# Patient Record
Sex: MALE | Race: WHITE | NOT HISPANIC OR LATINO | URBAN - METROPOLITAN AREA
[De-identification: names, ages, dates, MRNs, and addresses within clinical notes are randomized per-mention and may not be internally consistent; named-entity substitution may affect disease eponyms.]

---

## 2024-01-01 ENCOUNTER — OUTPATIENT (OUTPATIENT)
Dept: OUTPATIENT SERVICES | Facility: HOSPITAL | Age: 54
LOS: 1 days | Discharge: HOME | End: 2024-01-01

## 2024-01-01 ENCOUNTER — INPATIENT (INPATIENT)
Facility: HOSPITAL | Age: 54
LOS: 2 days | DRG: 195 | End: 2024-12-02
Attending: FAMILY MEDICINE | Admitting: STUDENT IN AN ORGANIZED HEALTH CARE EDUCATION/TRAINING PROGRAM
Payer: MEDICAID

## 2024-01-01 ENCOUNTER — NON-APPOINTMENT (OUTPATIENT)
Age: 54
End: 2024-01-01

## 2024-01-01 ENCOUNTER — RESULT REVIEW (OUTPATIENT)
Age: 54
End: 2024-01-01

## 2024-01-01 VITALS
TEMPERATURE: 98 F | OXYGEN SATURATION: 98 % | SYSTOLIC BLOOD PRESSURE: 130 MMHG | HEART RATE: 104 BPM | RESPIRATION RATE: 24 BRPM | DIASTOLIC BLOOD PRESSURE: 84 MMHG

## 2024-01-01 VITALS
RESPIRATION RATE: 15 BRPM | DIASTOLIC BLOOD PRESSURE: 59 MMHG | SYSTOLIC BLOOD PRESSURE: 85 MMHG | OXYGEN SATURATION: 97 % | HEART RATE: 127 BPM

## 2024-01-01 DIAGNOSIS — C34.90 MALIGNANT NEOPLASM OF UNSPECIFIED PART OF UNSPECIFIED BRONCHUS OR LUNG: ICD-10-CM

## 2024-01-01 DIAGNOSIS — E86.0 DEHYDRATION: ICD-10-CM
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
## 2024-01-01 DIAGNOSIS — R00.0 TACHYCARDIA, UNSPECIFIED: ICD-10-CM

## 2024-01-01 DIAGNOSIS — J18.9 PNEUMONIA, UNSPECIFIED ORGANISM: ICD-10-CM

## 2024-01-01 DIAGNOSIS — I87.1 COMPRESSION OF VEIN: ICD-10-CM

## 2024-01-01 DIAGNOSIS — Z51.11 ENCOUNTER FOR ANTINEOPLASTIC CHEMOTHERAPY: ICD-10-CM

## 2024-01-01 DIAGNOSIS — I48.0 PAROXYSMAL ATRIAL FIBRILLATION: ICD-10-CM

## 2024-01-01 DIAGNOSIS — R11.2 NAUSEA WITH VOMITING, UNSPECIFIED: ICD-10-CM

## 2024-01-01 DIAGNOSIS — J90 PLEURAL EFFUSION, NOT ELSEWHERE CLASSIFIED: ICD-10-CM

## 2024-01-01 LAB
ALBUMIN FLD-MCNC: 2.4 G/DL — SIGNIFICANT CHANGE UP
ALBUMIN SERPL ELPH-MCNC: 3.7 G/DL — SIGNIFICANT CHANGE UP (ref 3.3–5)
ALBUMIN SERPL ELPH-MCNC: 3.8 G/DL — SIGNIFICANT CHANGE UP (ref 3.3–5)
ALBUMIN SERPL ELPH-MCNC: 3.8 G/DL — SIGNIFICANT CHANGE UP (ref 3.3–5)
ALBUMIN SERPL ELPH-MCNC: 3.8 G/DL — SIGNIFICANT CHANGE UP (ref 3.3–5.2)
ALP SERPL-CCNC: 74 U/L — SIGNIFICANT CHANGE UP (ref 40–120)
ALP SERPL-CCNC: 76 U/L — SIGNIFICANT CHANGE UP (ref 40–120)
ALP SERPL-CCNC: 77 U/L — SIGNIFICANT CHANGE UP (ref 40–120)
ALP SERPL-CCNC: 79 U/L — SIGNIFICANT CHANGE UP (ref 40–120)
ALT FLD-CCNC: 11 U/L — SIGNIFICANT CHANGE UP (ref 10–45)
ALT FLD-CCNC: 205 U/L — HIGH (ref 10–45)
ALT FLD-CCNC: 28 U/L — SIGNIFICANT CHANGE UP
ALT FLD-CCNC: 405 U/L — HIGH (ref 10–45)
ANION GAP SERPL CALC-SCNC: 12 MMOL/L — SIGNIFICANT CHANGE UP (ref 5–17)
ANION GAP SERPL CALC-SCNC: 12 MMOL/L — SIGNIFICANT CHANGE UP (ref 5–17)
ANION GAP SERPL CALC-SCNC: 13 MMOL/L — SIGNIFICANT CHANGE UP (ref 5–17)
ANION GAP SERPL CALC-SCNC: 13 MMOL/L — SIGNIFICANT CHANGE UP (ref 5–17)
ANION GAP SERPL CALC-SCNC: 15 MMOL/L — SIGNIFICANT CHANGE UP (ref 5–17)
ANION GAP SERPL CALC-SCNC: 16 MMOL/L — SIGNIFICANT CHANGE UP (ref 5–17)
ANION GAP SERPL CALC-SCNC: 18 MMOL/L — HIGH (ref 5–17)
ANISOCYTOSIS BLD QL: SLIGHT — SIGNIFICANT CHANGE UP
APTT BLD: 31.1 SEC — SIGNIFICANT CHANGE UP (ref 24.5–35.6)
AST SERPL-CCNC: 186 U/L — HIGH (ref 10–40)
AST SERPL-CCNC: 32 U/L — SIGNIFICANT CHANGE UP
AST SERPL-CCNC: 41 U/L — HIGH (ref 10–40)
AST SERPL-CCNC: 86 U/L — HIGH (ref 10–40)
B PERT IGG+IGM PNL SER: ABNORMAL
BASE EXCESS BLDA CALC-SCNC: 6.8 MMOL/L — HIGH (ref -2–3)
BASOPHILS # BLD AUTO: 0 K/UL — SIGNIFICANT CHANGE UP (ref 0–0.2)
BASOPHILS # BLD AUTO: 0.03 K/UL — SIGNIFICANT CHANGE UP (ref 0–0.2)
BASOPHILS NFR BLD AUTO: 0 % — SIGNIFICANT CHANGE UP (ref 0–2)
BASOPHILS NFR BLD AUTO: 0.9 % — SIGNIFICANT CHANGE UP (ref 0–2)
BILIRUB SERPL-MCNC: 0.6 MG/DL — SIGNIFICANT CHANGE UP (ref 0.2–1.2)
BILIRUB SERPL-MCNC: 0.7 MG/DL — SIGNIFICANT CHANGE UP (ref 0.4–2)
BILIRUB SERPL-MCNC: 1 MG/DL — SIGNIFICANT CHANGE UP (ref 0.2–1.2)
BILIRUB SERPL-MCNC: 1 MG/DL — SIGNIFICANT CHANGE UP (ref 0.2–1.2)
BLOOD GAS COMMENTS ARTERIAL: SIGNIFICANT CHANGE UP
BUN SERPL-MCNC: 12 MG/DL — SIGNIFICANT CHANGE UP (ref 7–23)
BUN SERPL-MCNC: 13 MG/DL — SIGNIFICANT CHANGE UP (ref 7–23)
BUN SERPL-MCNC: 15 MG/DL — SIGNIFICANT CHANGE UP (ref 7–23)
BUN SERPL-MCNC: 25.6 MG/DL — HIGH (ref 8–20)
BUN SERPL-MCNC: 27.3 MG/DL — HIGH (ref 8–20)
BUN SERPL-MCNC: 28.1 MG/DL — HIGH (ref 8–20)
BUN SERPL-MCNC: 42.7 MG/DL — HIGH (ref 8–20)
CALCIUM SERPL-MCNC: 10.1 MG/DL — SIGNIFICANT CHANGE UP (ref 8.4–10.5)
CALCIUM SERPL-MCNC: 10.2 MG/DL — SIGNIFICANT CHANGE UP (ref 8.4–10.5)
CALCIUM SERPL-MCNC: 9.7 MG/DL — SIGNIFICANT CHANGE UP (ref 8.4–10.5)
CALCIUM SERPL-MCNC: 9.8 MG/DL — SIGNIFICANT CHANGE UP (ref 8.4–10.5)
CALCIUM SERPL-MCNC: 9.9 MG/DL — SIGNIFICANT CHANGE UP (ref 8.4–10.5)
CHLORIDE SERPL-SCNC: 91 MMOL/L — LOW (ref 96–108)
CHLORIDE SERPL-SCNC: 92 MMOL/L — LOW (ref 96–108)
CHLORIDE SERPL-SCNC: 92 MMOL/L — LOW (ref 96–108)
CHLORIDE SERPL-SCNC: 93 MMOL/L — LOW (ref 96–108)
CHLORIDE SERPL-SCNC: 98 MMOL/L — SIGNIFICANT CHANGE UP (ref 96–108)
CK MB CFR SERPL CALC: 3.2 NG/ML — SIGNIFICANT CHANGE UP (ref 0–6.7)
CK SERPL-CCNC: 330 U/L — HIGH (ref 30–200)
CO2 SERPL-SCNC: 25 MMOL/L — SIGNIFICANT CHANGE UP (ref 22–29)
CO2 SERPL-SCNC: 25 MMOL/L — SIGNIFICANT CHANGE UP (ref 22–29)
CO2 SERPL-SCNC: 27 MMOL/L — SIGNIFICANT CHANGE UP (ref 22–31)
CO2 SERPL-SCNC: 28 MMOL/L — SIGNIFICANT CHANGE UP (ref 22–29)
CO2 SERPL-SCNC: 28 MMOL/L — SIGNIFICANT CHANGE UP (ref 22–29)
CO2 SERPL-SCNC: 28 MMOL/L — SIGNIFICANT CHANGE UP (ref 22–31)
CO2 SERPL-SCNC: 28 MMOL/L — SIGNIFICANT CHANGE UP (ref 22–31)
COLOR FLD: ABNORMAL
CREAT SERPL-MCNC: 0.51 MG/DL — SIGNIFICANT CHANGE UP (ref 0.5–1.3)
CREAT SERPL-MCNC: 0.59 MG/DL — SIGNIFICANT CHANGE UP (ref 0.5–1.3)
CREAT SERPL-MCNC: 0.62 MG/DL — SIGNIFICANT CHANGE UP (ref 0.5–1.3)
CREAT SERPL-MCNC: 0.74 MG/DL — SIGNIFICANT CHANGE UP (ref 0.5–1.3)
CREAT SERPL-MCNC: 0.78 MG/DL — SIGNIFICANT CHANGE UP (ref 0.5–1.3)
CREAT SERPL-MCNC: 0.81 MG/DL — SIGNIFICANT CHANGE UP (ref 0.5–1.3)
CREAT SERPL-MCNC: 0.83 MG/DL — SIGNIFICANT CHANGE UP (ref 0.5–1.3)
CULTURE RESULTS: ABNORMAL
EGFR: 105 ML/MIN/1.73M2 — SIGNIFICANT CHANGE UP
EGFR: 107 ML/MIN/1.73M2 — SIGNIFICANT CHANGE UP
EGFR: 108 ML/MIN/1.73M2 — SIGNIFICANT CHANGE UP
EGFR: 108 ML/MIN/1.73M2 — SIGNIFICANT CHANGE UP
EGFR: 114 ML/MIN/1.73M2 — SIGNIFICANT CHANGE UP
EGFR: 116 ML/MIN/1.73M2 — SIGNIFICANT CHANGE UP
EGFR: 121 ML/MIN/1.73M2 — SIGNIFICANT CHANGE UP
ELLIPTOCYTES BLD QL SMEAR: SLIGHT — SIGNIFICANT CHANGE UP
ELLIPTOCYTES BLD QL SMEAR: SLIGHT — SIGNIFICANT CHANGE UP
EOSINOPHIL # BLD AUTO: 0 K/UL — SIGNIFICANT CHANGE UP (ref 0–0.5)
EOSINOPHIL # BLD AUTO: 0 K/UL — SIGNIFICANT CHANGE UP (ref 0–0.5)
EOSINOPHIL NFR BLD AUTO: 0 % — SIGNIFICANT CHANGE UP (ref 0–6)
EOSINOPHIL NFR BLD AUTO: 0 % — SIGNIFICANT CHANGE UP (ref 0–6)
FLUID INTAKE SUBSTANCE CLASS: SIGNIFICANT CHANGE UP
GIANT PLATELETS BLD QL SMEAR: PRESENT — SIGNIFICANT CHANGE UP
GIANT PLATELETS BLD QL SMEAR: PRESENT — SIGNIFICANT CHANGE UP
GLUCOSE BLDC GLUCOMTR-MCNC: 112 MG/DL — HIGH (ref 70–99)
GLUCOSE FLD-MCNC: 141 MG/DL — SIGNIFICANT CHANGE UP
GLUCOSE SERPL-MCNC: 114 MG/DL — HIGH (ref 70–99)
GLUCOSE SERPL-MCNC: 118 MG/DL — HIGH (ref 70–99)
GLUCOSE SERPL-MCNC: 134 MG/DL — HIGH (ref 70–99)
GLUCOSE SERPL-MCNC: 135 MG/DL — HIGH (ref 70–99)
GLUCOSE SERPL-MCNC: 137 MG/DL — HIGH (ref 70–99)
GLUCOSE SERPL-MCNC: 137 MG/DL — HIGH (ref 70–99)
GLUCOSE SERPL-MCNC: 181 MG/DL — HIGH (ref 70–99)
GRAM STN FLD: ABNORMAL
GRAM STN FLD: SIGNIFICANT CHANGE UP
HCO3 BLDA-SCNC: 31 MMOL/L — HIGH (ref 21–28)
HCT VFR BLD CALC: 30.5 % — LOW (ref 39–50)
HCT VFR BLD CALC: 30.5 % — LOW (ref 39–50)
HCT VFR BLD CALC: 30.9 % — LOW (ref 39–50)
HCT VFR BLD CALC: 31.6 % — LOW (ref 39–50)
HCT VFR BLD CALC: 37.3 % — LOW (ref 39–50)
HCT VFR BLD CALC: 38.2 % — LOW (ref 39–50)
HGB BLD-MCNC: 10.2 G/DL — LOW (ref 13–17)
HGB BLD-MCNC: 10.5 G/DL — LOW (ref 13–17)
HGB BLD-MCNC: 12.7 G/DL — LOW (ref 13–17)
HGB BLD-MCNC: 13.1 G/DL — SIGNIFICANT CHANGE UP (ref 13–17)
HGB BLD-MCNC: 9.9 G/DL — LOW (ref 13–17)
HGB BLD-MCNC: 9.9 G/DL — LOW (ref 13–17)
HOROWITZ INDEX BLDA+IHG-RTO: 100 — SIGNIFICANT CHANGE UP
HYPOCHROMIA BLD QL: SLIGHT — SIGNIFICANT CHANGE UP
INR BLD: 1.25 RATIO — HIGH (ref 0.85–1.16)
LDH SERPL L TO P-CCNC: 1172 U/L — HIGH (ref 98–192)
LDH SERPL L TO P-CCNC: 1310 U/L — SIGNIFICANT CHANGE UP
LEGIONELLA AG UR QL: NEGATIVE — SIGNIFICANT CHANGE UP
LYMPHOCYTES # BLD AUTO: 0.21 K/UL — LOW (ref 1–3.3)
LYMPHOCYTES # BLD AUTO: 0.62 K/UL — LOW (ref 1–3.3)
LYMPHOCYTES # BLD AUTO: 17.4 % — SIGNIFICANT CHANGE UP (ref 13–44)
LYMPHOCYTES # BLD AUTO: 6.3 % — LOW (ref 13–44)
LYMPHOCYTES # FLD: 79 % — SIGNIFICANT CHANGE UP
MAGNESIUM SERPL-MCNC: 1.9 MG/DL — SIGNIFICANT CHANGE UP (ref 1.6–2.6)
MAGNESIUM SERPL-MCNC: 1.9 MG/DL — SIGNIFICANT CHANGE UP (ref 1.6–2.6)
MANUAL SMEAR VERIFICATION: SIGNIFICANT CHANGE UP
MANUAL SMEAR VERIFICATION: SIGNIFICANT CHANGE UP
MCHC RBC-ENTMCNC: 28.8 PG — SIGNIFICANT CHANGE UP (ref 27–34)
MCHC RBC-ENTMCNC: 29 PG — SIGNIFICANT CHANGE UP (ref 27–34)
MCHC RBC-ENTMCNC: 29.1 PG — SIGNIFICANT CHANGE UP (ref 27–34)
MCHC RBC-ENTMCNC: 29.2 PG — SIGNIFICANT CHANGE UP (ref 27–34)
MCHC RBC-ENTMCNC: 29.6 PG — SIGNIFICANT CHANGE UP (ref 27–34)
MCHC RBC-ENTMCNC: 29.7 PG — SIGNIFICANT CHANGE UP (ref 27–34)
MCHC RBC-ENTMCNC: 32.5 G/DL — SIGNIFICANT CHANGE UP (ref 32–36)
MCHC RBC-ENTMCNC: 32.5 G/DL — SIGNIFICANT CHANGE UP (ref 32–36)
MCHC RBC-ENTMCNC: 33 G/DL — SIGNIFICANT CHANGE UP (ref 32–36)
MCHC RBC-ENTMCNC: 33.2 G/DL — SIGNIFICANT CHANGE UP (ref 32–36)
MCHC RBC-ENTMCNC: 34 G/DL — SIGNIFICANT CHANGE UP (ref 32–36)
MCHC RBC-ENTMCNC: 34.3 G/DL — SIGNIFICANT CHANGE UP (ref 32–36)
MCV RBC AUTO: 86.4 FL — SIGNIFICANT CHANGE UP (ref 80–100)
MCV RBC AUTO: 87.1 FL — SIGNIFICANT CHANGE UP (ref 80–100)
MCV RBC AUTO: 88 FL — SIGNIFICANT CHANGE UP (ref 80–100)
MCV RBC AUTO: 88.3 FL — SIGNIFICANT CHANGE UP (ref 80–100)
MCV RBC AUTO: 88.7 FL — SIGNIFICANT CHANGE UP (ref 80–100)
MCV RBC AUTO: 89.4 FL — SIGNIFICANT CHANGE UP (ref 80–100)
MESOTHL CELL # FLD: 14 % — SIGNIFICANT CHANGE UP
MICROCYTES BLD QL: SLIGHT — SIGNIFICANT CHANGE UP
MONOCYTES # BLD AUTO: 0.24 K/UL — SIGNIFICANT CHANGE UP (ref 0–0.9)
MONOCYTES # BLD AUTO: 0.34 K/UL — SIGNIFICANT CHANGE UP (ref 0–0.9)
MONOCYTES NFR BLD AUTO: 7.1 % — SIGNIFICANT CHANGE UP (ref 2–14)
MONOCYTES NFR BLD AUTO: 9.5 % — SIGNIFICANT CHANGE UP (ref 2–14)
MONOS+MACROS # FLD: 6 % — SIGNIFICANT CHANGE UP
MRSA PCR RESULT.: SIGNIFICANT CHANGE UP
NEUTROPHILS # BLD AUTO: 2.46 K/UL — SIGNIFICANT CHANGE UP (ref 1.8–7.4)
NEUTROPHILS # BLD AUTO: 2.64 K/UL — SIGNIFICANT CHANGE UP (ref 1.8–7.4)
NEUTROPHILS NFR BLD AUTO: 68.7 % — SIGNIFICANT CHANGE UP (ref 43–77)
NEUTROPHILS NFR BLD AUTO: 78.6 % — HIGH (ref 43–77)
NEUTROPHILS-BODY FLUID: 1 % — SIGNIFICANT CHANGE UP
NT-PROBNP SERPL-SCNC: 312 PG/ML — HIGH (ref 0–300)
OVALOCYTES BLD QL SMEAR: SLIGHT — SIGNIFICANT CHANGE UP
OVALOCYTES BLD QL SMEAR: SLIGHT — SIGNIFICANT CHANGE UP
PCO2 BLDA: 43 MMHG — SIGNIFICANT CHANGE UP (ref 35–48)
PH BLDA: 7.46 — HIGH (ref 7.35–7.45)
PH FLD: 8 — SIGNIFICANT CHANGE UP
PHOSPHATE SERPL-MCNC: 3.3 MG/DL — SIGNIFICANT CHANGE UP (ref 2.4–4.7)
PHOSPHATE SERPL-MCNC: 3.6 MG/DL — SIGNIFICANT CHANGE UP (ref 2.4–4.7)
PLAT MORPH BLD: NORMAL — SIGNIFICANT CHANGE UP
PLAT MORPH BLD: NORMAL — SIGNIFICANT CHANGE UP
PLATELET # BLD AUTO: 228 K/UL — SIGNIFICANT CHANGE UP (ref 150–400)
PLATELET # BLD AUTO: 240 K/UL — SIGNIFICANT CHANGE UP (ref 150–400)
PLATELET # BLD AUTO: 329 K/UL — SIGNIFICANT CHANGE UP (ref 150–400)
PLATELET # BLD AUTO: 415 K/UL — HIGH (ref 150–400)
PLATELET # BLD AUTO: 451 K/UL — HIGH (ref 150–400)
PLATELET # BLD AUTO: 465 K/UL — HIGH (ref 150–400)
PO2 BLDA: 235 MMHG — HIGH (ref 83–108)
POIKILOCYTOSIS BLD QL AUTO: SLIGHT — SIGNIFICANT CHANGE UP
POIKILOCYTOSIS BLD QL AUTO: SLIGHT — SIGNIFICANT CHANGE UP
POLYCHROMASIA BLD QL SMEAR: SLIGHT — SIGNIFICANT CHANGE UP
POLYCHROMASIA BLD QL SMEAR: SLIGHT — SIGNIFICANT CHANGE UP
POTASSIUM SERPL-MCNC: 4.2 MMOL/L — SIGNIFICANT CHANGE UP (ref 3.5–5.3)
POTASSIUM SERPL-MCNC: 4.2 MMOL/L — SIGNIFICANT CHANGE UP (ref 3.5–5.3)
POTASSIUM SERPL-MCNC: 4.3 MMOL/L — SIGNIFICANT CHANGE UP (ref 3.5–5.3)
POTASSIUM SERPL-MCNC: 4.6 MMOL/L — SIGNIFICANT CHANGE UP (ref 3.5–5.3)
POTASSIUM SERPL-MCNC: 4.7 MMOL/L — SIGNIFICANT CHANGE UP (ref 3.5–5.3)
POTASSIUM SERPL-MCNC: 4.9 MMOL/L — SIGNIFICANT CHANGE UP (ref 3.5–5.3)
POTASSIUM SERPL-MCNC: 5.2 MMOL/L — SIGNIFICANT CHANGE UP (ref 3.5–5.3)
POTASSIUM SERPL-SCNC: 4.2 MMOL/L — SIGNIFICANT CHANGE UP (ref 3.5–5.3)
POTASSIUM SERPL-SCNC: 4.2 MMOL/L — SIGNIFICANT CHANGE UP (ref 3.5–5.3)
POTASSIUM SERPL-SCNC: 4.3 MMOL/L — SIGNIFICANT CHANGE UP (ref 3.5–5.3)
POTASSIUM SERPL-SCNC: 4.6 MMOL/L — SIGNIFICANT CHANGE UP (ref 3.5–5.3)
POTASSIUM SERPL-SCNC: 4.7 MMOL/L — SIGNIFICANT CHANGE UP (ref 3.5–5.3)
POTASSIUM SERPL-SCNC: 4.9 MMOL/L — SIGNIFICANT CHANGE UP (ref 3.5–5.3)
POTASSIUM SERPL-SCNC: 5.2 MMOL/L — SIGNIFICANT CHANGE UP (ref 3.5–5.3)
PROCALCITONIN SERPL-MCNC: 0.13 NG/ML — HIGH (ref 0.02–0.1)
PROT FLD-MCNC: 3.5 G/DL — SIGNIFICANT CHANGE UP
PROT SERPL-MCNC: 7 G/DL — SIGNIFICANT CHANGE UP (ref 6–8.3)
PROT SERPL-MCNC: 7 G/DL — SIGNIFICANT CHANGE UP (ref 6–8.3)
PROT SERPL-MCNC: 7.1 G/DL — SIGNIFICANT CHANGE UP (ref 6–8.3)
PROT SERPL-MCNC: 7.4 G/DL — SIGNIFICANT CHANGE UP (ref 6.6–8.7)
PROTHROM AB SERPL-ACNC: 14.5 SEC — HIGH (ref 9.9–13.4)
RAPID RVP RESULT: SIGNIFICANT CHANGE UP
RBC # BLD: 3.41 M/UL — LOW (ref 4.2–5.8)
RBC # BLD: 3.44 M/UL — LOW (ref 4.2–5.8)
RBC # BLD: 3.5 M/UL — LOW (ref 4.2–5.8)
RBC # BLD: 3.59 M/UL — LOW (ref 4.2–5.8)
RBC # BLD: 4.28 M/UL — SIGNIFICANT CHANGE UP (ref 4.2–5.8)
RBC # BLD: 4.42 M/UL — SIGNIFICANT CHANGE UP (ref 4.2–5.8)
RBC # FLD: 12.9 % — SIGNIFICANT CHANGE UP (ref 10.3–14.5)
RBC # FLD: 13.2 % — SIGNIFICANT CHANGE UP (ref 10.3–14.5)
RBC # FLD: 13.7 % — SIGNIFICANT CHANGE UP (ref 10.3–14.5)
RBC # FLD: 13.8 % — SIGNIFICANT CHANGE UP (ref 10.3–14.5)
RBC # FLD: 13.9 % — SIGNIFICANT CHANGE UP (ref 10.3–14.5)
RBC # FLD: 13.9 % — SIGNIFICANT CHANGE UP (ref 10.3–14.5)
RBC BLD AUTO: ABNORMAL
RBC BLD AUTO: ABNORMAL
RCV VOL RI: HIGH /UL (ref 0–0)
S AUREUS DNA NOSE QL NAA+PROBE: DETECTED
S PNEUM AG UR QL: NEGATIVE — SIGNIFICANT CHANGE UP
SAO2 % BLDA: 99.4 % — HIGH (ref 94–98)
SARS-COV-2 RNA SPEC QL NAA+PROBE: SIGNIFICANT CHANGE UP
SMUDGE CELLS # BLD: PRESENT — SIGNIFICANT CHANGE UP
SODIUM SERPL-SCNC: 131 MMOL/L — LOW (ref 135–145)
SODIUM SERPL-SCNC: 132 MMOL/L — LOW (ref 135–145)
SODIUM SERPL-SCNC: 134 MMOL/L — LOW (ref 135–145)
SODIUM SERPL-SCNC: 135 MMOL/L — SIGNIFICANT CHANGE UP (ref 135–145)
SODIUM SERPL-SCNC: 138 MMOL/L — SIGNIFICANT CHANGE UP (ref 135–145)
SPECIMEN SOURCE: SIGNIFICANT CHANGE UP
T4 FREE SERPL-MCNC: 1.3 NG/DL — SIGNIFICANT CHANGE UP (ref 0.9–1.8)
TOTAL NUCLEATED CELL COUNT, BODY FLUID: 2269 /UL — SIGNIFICANT CHANGE UP
TROPONIN T, HIGH SENSITIVITY RESULT: 11 NG/L — SIGNIFICANT CHANGE UP (ref 0–51)
TROPONIN T, HIGH SENSITIVITY RESULT: 12 NG/L — SIGNIFICANT CHANGE UP (ref 0–51)
TROPONIN T, HIGH SENSITIVITY RESULT: 13 NG/L — SIGNIFICANT CHANGE UP (ref 0–51)
TSH SERPL-MCNC: 0.43 UIU/ML — SIGNIFICANT CHANGE UP (ref 0.27–4.2)
TUBE TYPE: SIGNIFICANT CHANGE UP
VANCOMYCIN TROUGH SERPL-MCNC: 10.8 UG/ML — SIGNIFICANT CHANGE UP (ref 10–20)
VARIANT LYMPHS # BLD: 3.5 % — SIGNIFICANT CHANGE UP (ref 0–6)
VARIANT LYMPHS # BLD: 8 % — HIGH (ref 0–6)
WBC # BLD: 3.36 K/UL — LOW (ref 3.8–10.5)
WBC # BLD: 3.58 K/UL — LOW (ref 3.8–10.5)
WBC # BLD: 4.04 K/UL — SIGNIFICANT CHANGE UP (ref 3.8–10.5)
WBC # BLD: 5.5 K/UL — SIGNIFICANT CHANGE UP (ref 3.8–10.5)
WBC # BLD: 5.59 K/UL — SIGNIFICANT CHANGE UP (ref 3.8–10.5)
WBC # BLD: 6.03 K/UL — SIGNIFICANT CHANGE UP (ref 3.8–10.5)
WBC # FLD AUTO: 3.36 K/UL — LOW (ref 3.8–10.5)
WBC # FLD AUTO: 3.58 K/UL — LOW (ref 3.8–10.5)
WBC # FLD AUTO: 4.04 K/UL — SIGNIFICANT CHANGE UP (ref 3.8–10.5)
WBC # FLD AUTO: 5.5 K/UL — SIGNIFICANT CHANGE UP (ref 3.8–10.5)
WBC # FLD AUTO: 5.59 K/UL — SIGNIFICANT CHANGE UP (ref 3.8–10.5)
WBC # FLD AUTO: 6.03 K/UL — SIGNIFICANT CHANGE UP (ref 3.8–10.5)

## 2024-01-01 PROCEDURE — 85610 PROTHROMBIN TIME: CPT

## 2024-01-01 PROCEDURE — 71045 X-RAY EXAM CHEST 1 VIEW: CPT

## 2024-01-01 PROCEDURE — 88112 CYTOPATH CELL ENHANCE TECH: CPT | Mod: 26

## 2024-01-01 PROCEDURE — 84145 PROCALCITONIN (PCT): CPT

## 2024-01-01 PROCEDURE — 80053 COMPREHEN METABOLIC PANEL: CPT

## 2024-01-01 PROCEDURE — 71275 CT ANGIOGRAPHY CHEST: CPT | Mod: MC

## 2024-01-01 PROCEDURE — 93306 TTE W/DOPPLER COMPLETE: CPT | Mod: 26

## 2024-01-01 PROCEDURE — 83615 LACTATE (LD) (LDH) ENZYME: CPT

## 2024-01-01 PROCEDURE — 99232 SBSQ HOSP IP/OBS MODERATE 35: CPT

## 2024-01-01 PROCEDURE — 94760 N-INVAS EAR/PLS OXIMETRY 1: CPT

## 2024-01-01 PROCEDURE — 93010 ELECTROCARDIOGRAM REPORT: CPT

## 2024-01-01 PROCEDURE — 87449 NOS EACH ORGANISM AG IA: CPT

## 2024-01-01 PROCEDURE — 87205 SMEAR GRAM STAIN: CPT

## 2024-01-01 PROCEDURE — 71045 X-RAY EXAM CHEST 1 VIEW: CPT | Mod: 26

## 2024-01-01 PROCEDURE — 99223 1ST HOSP IP/OBS HIGH 75: CPT

## 2024-01-01 PROCEDURE — 85025 COMPLETE CBC W/AUTO DIFF WBC: CPT

## 2024-01-01 PROCEDURE — 99231 SBSQ HOSP IP/OBS SF/LOW 25: CPT

## 2024-01-01 PROCEDURE — 96375 TX/PRO/DX INJ NEW DRUG ADDON: CPT

## 2024-01-01 PROCEDURE — 71275 CT ANGIOGRAPHY CHEST: CPT | Mod: 26,MC

## 2024-01-01 PROCEDURE — 89051 BODY FLUID CELL COUNT: CPT

## 2024-01-01 PROCEDURE — 99223 1ST HOSP IP/OBS HIGH 75: CPT | Mod: GC

## 2024-01-01 PROCEDURE — 32557 INSERT CATH PLEURA W/ IMAGE: CPT | Mod: RT

## 2024-01-01 PROCEDURE — 84157 ASSAY OF PROTEIN OTHER: CPT

## 2024-01-01 PROCEDURE — 94660 CPAP INITIATION&MGMT: CPT

## 2024-01-01 PROCEDURE — 93010 ELECTROCARDIOGRAM REPORT: CPT | Mod: 77

## 2024-01-01 PROCEDURE — 94640 AIRWAY INHALATION TREATMENT: CPT

## 2024-01-01 PROCEDURE — 0225U NFCT DS DNA&RNA 21 SARSCOV2: CPT

## 2024-01-01 PROCEDURE — 71045 X-RAY EXAM CHEST 1 VIEW: CPT | Mod: 26,77

## 2024-01-01 PROCEDURE — 99231 SBSQ HOSP IP/OBS SF/LOW 25: CPT | Mod: 25

## 2024-01-01 PROCEDURE — 88112 CYTOPATH CELL ENHANCE TECH: CPT

## 2024-01-01 PROCEDURE — 99233 SBSQ HOSP IP/OBS HIGH 50: CPT

## 2024-01-01 PROCEDURE — 93306 TTE W/DOPPLER COMPLETE: CPT

## 2024-01-01 PROCEDURE — 99222 1ST HOSP IP/OBS MODERATE 55: CPT

## 2024-01-01 PROCEDURE — 36600 WITHDRAWAL OF ARTERIAL BLOOD: CPT

## 2024-01-01 PROCEDURE — 84443 ASSAY THYROID STIM HORMONE: CPT

## 2024-01-01 PROCEDURE — 82553 CREATINE MB FRACTION: CPT

## 2024-01-01 PROCEDURE — 80048 BASIC METABOLIC PNL TOTAL CA: CPT

## 2024-01-01 PROCEDURE — 87040 BLOOD CULTURE FOR BACTERIA: CPT

## 2024-01-01 PROCEDURE — 84100 ASSAY OF PHOSPHORUS: CPT

## 2024-01-01 PROCEDURE — 82803 BLOOD GASES ANY COMBINATION: CPT

## 2024-01-01 PROCEDURE — 88341 IMHCHEM/IMCYTCHM EA ADD ANTB: CPT

## 2024-01-01 PROCEDURE — 93005 ELECTROCARDIOGRAM TRACING: CPT

## 2024-01-01 PROCEDURE — 82962 GLUCOSE BLOOD TEST: CPT

## 2024-01-01 PROCEDURE — 88305 TISSUE EXAM BY PATHOLOGIST: CPT

## 2024-01-01 PROCEDURE — 36415 COLL VENOUS BLD VENIPUNCTURE: CPT

## 2024-01-01 PROCEDURE — 99285 EMERGENCY DEPT VISIT HI MDM: CPT

## 2024-01-01 PROCEDURE — 82550 ASSAY OF CK (CPK): CPT

## 2024-01-01 PROCEDURE — 99221 1ST HOSP IP/OBS SF/LOW 40: CPT

## 2024-01-01 PROCEDURE — 88305 TISSUE EXAM BY PATHOLOGIST: CPT | Mod: 26

## 2024-01-01 PROCEDURE — 87015 SPECIMEN INFECT AGNT CONCNTJ: CPT

## 2024-01-01 PROCEDURE — 82945 GLUCOSE OTHER FLUID: CPT

## 2024-01-01 PROCEDURE — 87641 MR-STAPH DNA AMP PROBE: CPT

## 2024-01-01 PROCEDURE — 83735 ASSAY OF MAGNESIUM: CPT

## 2024-01-01 PROCEDURE — 96374 THER/PROPH/DIAG INJ IV PUSH: CPT

## 2024-01-01 PROCEDURE — 83880 ASSAY OF NATRIURETIC PEPTIDE: CPT

## 2024-01-01 PROCEDURE — 82042 OTHER SOURCE ALBUMIN QUAN EA: CPT

## 2024-01-01 PROCEDURE — 87102 FUNGUS ISOLATION CULTURE: CPT

## 2024-01-01 PROCEDURE — 88342 IMHCHEM/IMCYTCHM 1ST ANTB: CPT

## 2024-01-01 PROCEDURE — 85027 COMPLETE CBC AUTOMATED: CPT

## 2024-01-01 PROCEDURE — 83986 ASSAY PH BODY FLUID NOS: CPT

## 2024-01-01 PROCEDURE — 84439 ASSAY OF FREE THYROXINE: CPT

## 2024-01-01 PROCEDURE — 87640 STAPH A DNA AMP PROBE: CPT

## 2024-01-01 PROCEDURE — 87070 CULTURE OTHR SPECIMN AEROBIC: CPT

## 2024-01-01 PROCEDURE — 88341 IMHCHEM/IMCYTCHM EA ADD ANTB: CPT | Mod: 26

## 2024-01-01 PROCEDURE — 87899 AGENT NOS ASSAY W/OPTIC: CPT

## 2024-01-01 PROCEDURE — 84484 ASSAY OF TROPONIN QUANT: CPT

## 2024-01-01 PROCEDURE — 85730 THROMBOPLASTIN TIME PARTIAL: CPT

## 2024-01-01 PROCEDURE — 87075 CULTR BACTERIA EXCEPT BLOOD: CPT

## 2024-01-01 PROCEDURE — 99497 ADVNCD CARE PLAN 30 MIN: CPT | Mod: 25

## 2024-01-01 PROCEDURE — 88342 IMHCHEM/IMCYTCHM 1ST ANTB: CPT | Mod: 26

## 2024-01-01 PROCEDURE — 80202 ASSAY OF VANCOMYCIN: CPT

## 2024-01-01 RX ORDER — ALPRAZOLAM 0.5 MG
0.25 TABLET ORAL ONCE
Refills: 0 | Status: DISCONTINUED | OUTPATIENT
Start: 2024-01-01 | End: 2024-01-01

## 2024-01-01 RX ORDER — DILTIAZEM HYDROCHLORIDE 240 MG/1
10 CAPSULE, COATED, EXTENDED RELEASE ORAL ONCE
Refills: 0 | Status: DISCONTINUED | OUTPATIENT
Start: 2024-01-01 | End: 2024-01-01

## 2024-01-01 RX ORDER — METOPROLOL TARTRATE 100 MG/1
2.5 TABLET, FILM COATED ORAL EVERY 6 HOURS
Refills: 0 | Status: DISCONTINUED | OUTPATIENT
Start: 2024-01-01 | End: 2024-01-01

## 2024-01-01 RX ORDER — SODIUM CHLORIDE 9 MG/ML
500 INJECTION, SOLUTION INTRAMUSCULAR; INTRAVENOUS; SUBCUTANEOUS ONCE
Refills: 0 | Status: COMPLETED | OUTPATIENT
Start: 2024-01-01 | End: 2024-01-01

## 2024-01-01 RX ORDER — METOPROLOL TARTRATE 100 MG/1
5 TABLET, FILM COATED ORAL ONCE
Refills: 0 | Status: COMPLETED | OUTPATIENT
Start: 2024-01-01 | End: 2024-01-01

## 2024-01-01 RX ORDER — MULTIVIT WITH MINERALS/LUTEIN
500 TABLET ORAL DAILY
Refills: 0 | Status: DISCONTINUED | OUTPATIENT
Start: 2024-01-01 | End: 2024-01-01

## 2024-01-01 RX ORDER — LORAZEPAM 2 MG/1
0.5 TABLET ORAL ONCE
Refills: 0 | Status: DISCONTINUED | OUTPATIENT
Start: 2024-01-01 | End: 2024-01-01

## 2024-01-01 RX ORDER — LEVALBUTEROL 0.63 MG/3ML
1.25 SOLUTION RESPIRATORY (INHALATION) EVERY 8 HOURS
Refills: 0 | Status: DISCONTINUED | OUTPATIENT
Start: 2024-01-01 | End: 2024-01-01

## 2024-01-01 RX ORDER — IPRATROPIUM BROMIDE AND ALBUTEROL SULFATE 2.5; .5 MG/3ML; MG/3ML
3 SOLUTION RESPIRATORY (INHALATION) ONCE
Refills: 0 | Status: COMPLETED | OUTPATIENT
Start: 2024-01-01 | End: 2024-01-01

## 2024-01-01 RX ORDER — HYDROMORPHONE HYDROCHLORIDE 2 MG/1
0.2 TABLET ORAL ONCE
Refills: 0 | Status: DISCONTINUED | OUTPATIENT
Start: 2024-01-01 | End: 2024-01-01

## 2024-01-01 RX ORDER — AZITHROMYCIN 250 MG/1
500 TABLET, FILM COATED ORAL EVERY 24 HOURS
Refills: 0 | Status: DISCONTINUED | OUTPATIENT
Start: 2024-01-01 | End: 2024-01-01

## 2024-01-01 RX ORDER — VANCOMYCIN HCL 900 MCG/MG
1500 POWDER (GRAM) MISCELLANEOUS EVERY 12 HOURS
Refills: 0 | Status: DISCONTINUED | OUTPATIENT
Start: 2024-01-01 | End: 2024-01-01

## 2024-01-01 RX ORDER — ONDANSETRON HYDROCHLORIDE 4 MG/1
4 TABLET, FILM COATED ORAL EVERY 8 HOURS
Refills: 0 | Status: DISCONTINUED | OUTPATIENT
Start: 2024-01-01 | End: 2024-01-01

## 2024-01-01 RX ORDER — LORAZEPAM 2 MG/1
1 TABLET ORAL EVERY 8 HOURS
Refills: 0 | Status: DISCONTINUED | OUTPATIENT
Start: 2024-01-01 | End: 2024-01-01

## 2024-01-01 RX ORDER — ACETAMINOPHEN 500MG 500 MG/1
650 TABLET, COATED ORAL EVERY 6 HOURS
Refills: 0 | Status: DISCONTINUED | OUTPATIENT
Start: 2024-01-01 | End: 2024-01-01

## 2024-01-01 RX ORDER — LORAZEPAM 2 MG/1
1 TABLET ORAL ONCE
Refills: 0 | Status: DISCONTINUED | OUTPATIENT
Start: 2024-01-01 | End: 2024-01-01

## 2024-01-01 RX ORDER — ALPRAZOLAM 0.5 MG
1 TABLET ORAL EVERY 8 HOURS
Refills: 0 | Status: DISCONTINUED | OUTPATIENT
Start: 2024-01-01 | End: 2024-01-01

## 2024-01-01 RX ORDER — METOPROLOL TARTRATE 100 MG/1
2.5 TABLET, FILM COATED ORAL ONCE
Refills: 0 | Status: COMPLETED | OUTPATIENT
Start: 2024-01-01 | End: 2024-01-01

## 2024-01-01 RX ORDER — CEFTRIAXONE SODIUM 1 G
1000 VIAL (EA) INJECTION ONCE
Refills: 0 | Status: DISCONTINUED | OUTPATIENT
Start: 2024-01-01 | End: 2024-01-01

## 2024-01-01 RX ORDER — VANCOMYCIN HCL 900 MCG/MG
POWDER (GRAM) MISCELLANEOUS
Refills: 0 | Status: DISCONTINUED | OUTPATIENT
Start: 2024-01-01 | End: 2024-01-01

## 2024-01-01 RX ORDER — DEXAMETHASONE 1.5 MG/1
4 TABLET ORAL EVERY 8 HOURS
Refills: 0 | Status: DISCONTINUED | OUTPATIENT
Start: 2024-01-01 | End: 2024-01-01

## 2024-01-01 RX ORDER — CEFTRIAXONE SODIUM 1 G
1000 VIAL (EA) INJECTION ONCE
Refills: 0 | Status: COMPLETED | OUTPATIENT
Start: 2024-01-01 | End: 2024-01-01

## 2024-01-01 RX ORDER — ACETAMINOPHEN 500MG 500 MG/1
1000 TABLET, COATED ORAL ONCE
Refills: 0 | Status: COMPLETED | OUTPATIENT
Start: 2024-01-01 | End: 2024-01-01

## 2024-01-01 RX ORDER — CEFEPIME 2 G/1
INJECTION, POWDER, FOR SOLUTION INTRAVENOUS
Refills: 0 | Status: DISCONTINUED | OUTPATIENT
Start: 2024-01-01 | End: 2024-01-01

## 2024-01-01 RX ORDER — POLYETHYLENE GLYCOL 3350 17 G/17G
17 POWDER, FOR SOLUTION ORAL DAILY
Refills: 0 | Status: DISCONTINUED | OUTPATIENT
Start: 2024-01-01 | End: 2024-01-01

## 2024-01-01 RX ORDER — METOPROLOL TARTRATE 100 MG/1
25 TABLET, FILM COATED ORAL THREE TIMES A DAY
Refills: 0 | Status: DISCONTINUED | OUTPATIENT
Start: 2024-01-01 | End: 2024-01-01

## 2024-01-01 RX ORDER — AZITHROMYCIN 250 MG/1
500 TABLET, FILM COATED ORAL ONCE
Refills: 0 | Status: COMPLETED | OUTPATIENT
Start: 2024-01-01 | End: 2024-01-01

## 2024-01-01 RX ORDER — IPRATROPIUM BROMIDE AND ALBUTEROL SULFATE 2.5; .5 MG/3ML; MG/3ML
3 SOLUTION RESPIRATORY (INHALATION) EVERY 6 HOURS
Refills: 0 | Status: DISCONTINUED | OUTPATIENT
Start: 2024-01-01 | End: 2024-01-01

## 2024-01-01 RX ORDER — CEFEPIME 2 G/1
2000 INJECTION, POWDER, FOR SOLUTION INTRAVENOUS EVERY 8 HOURS
Refills: 0 | Status: DISCONTINUED | OUTPATIENT
Start: 2024-01-01 | End: 2024-01-01

## 2024-01-01 RX ORDER — CYANOCOBALAMIN/FOLIC AC/VIT B6 1-2.2-25MG
1 TABLET ORAL DAILY
Refills: 0 | Status: DISCONTINUED | OUTPATIENT
Start: 2024-01-01 | End: 2024-01-01

## 2024-01-01 RX ORDER — SCOPOLAMINE 1 MG/3D
1 PATCH, EXTENDED RELEASE TRANSDERMAL
Refills: 0 | Status: DISCONTINUED | OUTPATIENT
Start: 2024-01-01 | End: 2024-01-01

## 2024-01-01 RX ORDER — CEFEPIME 2 G/1
2000 INJECTION, POWDER, FOR SOLUTION INTRAVENOUS ONCE
Refills: 0 | Status: COMPLETED | OUTPATIENT
Start: 2024-01-01 | End: 2024-01-01

## 2024-01-01 RX ORDER — ALPRAZOLAM 0.5 MG
0.5 TABLET ORAL AT BEDTIME
Refills: 0 | Status: DISCONTINUED | OUTPATIENT
Start: 2024-01-01 | End: 2024-01-01

## 2024-01-01 RX ORDER — SODIUM CHLORIDE 9 MG/ML
1000 INJECTION, SOLUTION INTRAMUSCULAR; INTRAVENOUS; SUBCUTANEOUS
Refills: 0 | Status: DISCONTINUED | OUTPATIENT
Start: 2024-01-01 | End: 2024-01-01

## 2024-01-01 RX ORDER — GLYCOPYRROLATE 1 MG/1
0.2 TABLET ORAL EVERY 4 HOURS
Refills: 0 | Status: DISCONTINUED | OUTPATIENT
Start: 2024-01-01 | End: 2024-01-01

## 2024-01-01 RX ORDER — MAGNESIUM, ALUMINUM HYDROXIDE 200-225/5
30 SUSPENSION, ORAL (FINAL DOSE FORM) ORAL EVERY 4 HOURS
Refills: 0 | Status: DISCONTINUED | OUTPATIENT
Start: 2024-01-01 | End: 2024-01-01

## 2024-01-01 RX ORDER — ALPRAZOLAM 0.5 MG
0.5 TABLET ORAL EVERY 8 HOURS
Refills: 0 | Status: DISCONTINUED | OUTPATIENT
Start: 2024-01-01 | End: 2024-01-01

## 2024-01-01 RX ORDER — FUROSEMIDE 40 MG/1
20 TABLET ORAL ONCE
Refills: 0 | Status: COMPLETED | OUTPATIENT
Start: 2024-01-01 | End: 2024-01-01

## 2024-01-01 RX ORDER — ACETAMINOPHEN, DIPHENHYDRAMINE HCL, PHENYLEPHRINE HCL 325; 25; 5 MG/1; MG/1; MG/1
3 TABLET ORAL AT BEDTIME
Refills: 0 | Status: DISCONTINUED | OUTPATIENT
Start: 2024-01-01 | End: 2024-01-01

## 2024-01-01 RX ORDER — DRONABINOL 2.5 MG
2.5 CAPSULE ORAL DAILY
Refills: 0 | Status: DISCONTINUED | OUTPATIENT
Start: 2024-01-01 | End: 2024-01-01

## 2024-01-01 RX ADMIN — ACETAMINOPHEN 500MG 1000 MILLIGRAM(S): 500 TABLET, COATED ORAL at 00:48

## 2024-01-01 RX ADMIN — Medication 1 MILLIGRAM(S): at 12:17

## 2024-01-01 RX ADMIN — Medication 2 MG/HR: at 14:09

## 2024-01-01 RX ADMIN — Medication 2.5 MILLIGRAM(S): at 15:54

## 2024-01-01 RX ADMIN — CEFEPIME 2000 MILLIGRAM(S): 2 INJECTION, POWDER, FOR SOLUTION INTRAVENOUS at 13:58

## 2024-01-01 RX ADMIN — Medication 2 MILLIGRAM(S): at 14:48

## 2024-01-01 RX ADMIN — METOPROLOL TARTRATE 2.5 MILLIGRAM(S): 100 TABLET, FILM COATED ORAL at 16:55

## 2024-01-01 RX ADMIN — Medication 300 MILLIGRAM(S): at 06:13

## 2024-01-01 RX ADMIN — CEFEPIME 2000 MILLIGRAM(S): 2 INJECTION, POWDER, FOR SOLUTION INTRAVENOUS at 23:11

## 2024-01-01 RX ADMIN — Medication 0.5 MILLIGRAM(S): at 22:37

## 2024-01-01 RX ADMIN — HYDROMORPHONE HYDROCHLORIDE 0.2 MILLIGRAM(S): 2 TABLET ORAL at 20:06

## 2024-01-01 RX ADMIN — METOPROLOL TARTRATE 5 MILLIGRAM(S): 100 TABLET, FILM COATED ORAL at 05:52

## 2024-01-01 RX ADMIN — CEFEPIME 2000 MILLIGRAM(S): 2 INJECTION, POWDER, FOR SOLUTION INTRAVENOUS at 14:48

## 2024-01-01 RX ADMIN — IPRATROPIUM BROMIDE AND ALBUTEROL SULFATE 3 MILLILITER(S): 2.5; .5 SOLUTION RESPIRATORY (INHALATION) at 07:49

## 2024-01-01 RX ADMIN — Medication 2 MILLIGRAM(S): at 08:46

## 2024-01-01 RX ADMIN — Medication 1 MILLIGRAM(S): at 12:15

## 2024-01-01 RX ADMIN — CEFEPIME 2000 MILLIGRAM(S): 2 INJECTION, POWDER, FOR SOLUTION INTRAVENOUS at 06:12

## 2024-01-01 RX ADMIN — SCOPOLAMINE 1 PATCH: 1 PATCH, EXTENDED RELEASE TRANSDERMAL at 14:10

## 2024-01-01 RX ADMIN — CEFEPIME 2000 MILLIGRAM(S): 2 INJECTION, POWDER, FOR SOLUTION INTRAVENOUS at 19:05

## 2024-01-01 RX ADMIN — Medication 1000 MILLIGRAM(S): at 16:27

## 2024-01-01 RX ADMIN — SODIUM CHLORIDE 1000 MILLILITER(S): 9 INJECTION, SOLUTION INTRAMUSCULAR; INTRAVENOUS; SUBCUTANEOUS at 08:53

## 2024-01-01 RX ADMIN — HYDROMORPHONE HYDROCHLORIDE 0.2 MILLIGRAM(S): 2 TABLET ORAL at 00:16

## 2024-01-01 RX ADMIN — ACETAMINOPHEN, DIPHENHYDRAMINE HCL, PHENYLEPHRINE HCL 3 MILLIGRAM(S): 325; 25; 5 TABLET ORAL at 21:54

## 2024-01-01 RX ADMIN — IPRATROPIUM BROMIDE AND ALBUTEROL SULFATE 3 MILLILITER(S): 2.5; .5 SOLUTION RESPIRATORY (INHALATION) at 14:48

## 2024-01-01 RX ADMIN — Medication 500 MILLIGRAM(S): at 11:41

## 2024-01-01 RX ADMIN — Medication 300 MILLIGRAM(S): at 05:39

## 2024-01-01 RX ADMIN — IPRATROPIUM BROMIDE AND ALBUTEROL SULFATE 3 MILLILITER(S): 2.5; .5 SOLUTION RESPIRATORY (INHALATION) at 20:25

## 2024-01-01 RX ADMIN — SCOPOLAMINE 1 PATCH: 1 PATCH, EXTENDED RELEASE TRANSDERMAL at 19:30

## 2024-01-01 RX ADMIN — Medication 1 TABLET(S): at 09:02

## 2024-01-01 RX ADMIN — AZITHROMYCIN 255 MILLIGRAM(S): 250 TABLET, FILM COATED ORAL at 16:58

## 2024-01-01 RX ADMIN — LORAZEPAM 1 MILLIGRAM(S): 2 TABLET ORAL at 14:20

## 2024-01-01 RX ADMIN — CEFEPIME 2000 MILLIGRAM(S): 2 INJECTION, POWDER, FOR SOLUTION INTRAVENOUS at 05:40

## 2024-01-01 RX ADMIN — Medication 1 MILLIGRAM(S): at 17:44

## 2024-01-01 RX ADMIN — Medication 4 MG/HR: at 22:08

## 2024-01-01 RX ADMIN — Medication 1 MILLIGRAM(S): at 03:58

## 2024-01-01 RX ADMIN — CEFEPIME 2000 MILLIGRAM(S): 2 INJECTION, POWDER, FOR SOLUTION INTRAVENOUS at 05:36

## 2024-01-01 RX ADMIN — LORAZEPAM 0.5 MILLIGRAM(S): 2 TABLET ORAL at 16:28

## 2024-01-01 RX ADMIN — Medication 500 MILLIGRAM(S): at 09:02

## 2024-01-01 RX ADMIN — Medication 1 MILLIGRAM(S): at 12:40

## 2024-01-01 RX ADMIN — Medication 300 MILLIGRAM(S): at 19:23

## 2024-01-01 RX ADMIN — CEFEPIME 2000 MILLIGRAM(S): 2 INJECTION, POWDER, FOR SOLUTION INTRAVENOUS at 21:32

## 2024-01-01 RX ADMIN — METOPROLOL TARTRATE 2.5 MILLIGRAM(S): 100 TABLET, FILM COATED ORAL at 14:09

## 2024-01-01 RX ADMIN — METOPROLOL TARTRATE 5 MILLIGRAM(S): 100 TABLET, FILM COATED ORAL at 06:03

## 2024-01-01 RX ADMIN — SODIUM CHLORIDE 250 MILLILITER(S): 9 INJECTION, SOLUTION INTRAMUSCULAR; INTRAVENOUS; SUBCUTANEOUS at 11:16

## 2024-01-01 RX ADMIN — DEXAMETHASONE 4 MILLIGRAM(S): 1.5 TABLET ORAL at 14:10

## 2024-01-01 RX ADMIN — Medication 1 MILLIGRAM(S): at 00:48

## 2024-01-01 RX ADMIN — AZITHROMYCIN 255 MILLIGRAM(S): 250 TABLET, FILM COATED ORAL at 17:42

## 2024-01-01 RX ADMIN — AZITHROMYCIN 255 MILLIGRAM(S): 250 TABLET, FILM COATED ORAL at 16:27

## 2024-01-01 RX ADMIN — Medication 0.5 MILLIGRAM(S): at 23:11

## 2024-01-01 RX ADMIN — Medication 4 MILLIGRAM(S): at 11:42

## 2024-01-01 RX ADMIN — Medication 4 MG/HR: at 16:04

## 2024-01-01 RX ADMIN — Medication 1 MILLIGRAM(S): at 04:13

## 2024-01-01 RX ADMIN — Medication 2.5 MILLIGRAM(S): at 11:41

## 2024-01-01 RX ADMIN — SODIUM CHLORIDE 100 MILLILITER(S): 9 INJECTION, SOLUTION INTRAMUSCULAR; INTRAVENOUS; SUBCUTANEOUS at 08:52

## 2024-01-01 RX ADMIN — LORAZEPAM 1 MILLIGRAM(S): 2 TABLET ORAL at 16:03

## 2024-01-01 RX ADMIN — GLYCOPYRROLATE 0.2 MILLIGRAM(S): 1 TABLET ORAL at 16:55

## 2024-01-01 RX ADMIN — Medication 4 MG/HR: at 17:04

## 2024-01-01 RX ADMIN — Medication 1 MILLIGRAM(S): at 23:47

## 2024-01-01 RX ADMIN — HYDROMORPHONE HYDROCHLORIDE 0.2 MILLIGRAM(S): 2 TABLET ORAL at 20:21

## 2024-01-01 RX ADMIN — Medication 0.25 MILLIGRAM(S): at 09:02

## 2024-01-01 RX ADMIN — FUROSEMIDE 20 MILLIGRAM(S): 40 TABLET ORAL at 13:09

## 2024-01-01 RX ADMIN — Medication 2 MG/HR: at 15:09

## 2024-01-01 RX ADMIN — HYDROMORPHONE HYDROCHLORIDE 0.2 MILLIGRAM(S): 2 TABLET ORAL at 00:31

## 2024-01-01 RX ADMIN — Medication 1 MILLIGRAM(S): at 14:09

## 2024-01-01 RX ADMIN — Medication 2 MILLIGRAM(S): at 07:46

## 2024-01-01 RX ADMIN — Medication 1 TABLET(S): at 11:41

## 2024-01-01 RX ADMIN — ACETAMINOPHEN 500MG 400 MILLIGRAM(S): 500 TABLET, COATED ORAL at 23:48

## 2024-01-01 RX ADMIN — Medication 1 MILLIGRAM(S): at 11:15

## 2024-01-01 RX ADMIN — CEFEPIME 2000 MILLIGRAM(S): 2 INJECTION, POWDER, FOR SOLUTION INTRAVENOUS at 22:37

## 2024-01-01 RX ADMIN — CEFEPIME 2000 MILLIGRAM(S): 2 INJECTION, POWDER, FOR SOLUTION INTRAVENOUS at 14:09

## 2024-01-01 RX ADMIN — Medication 0.5 MILLIGRAM(S): at 10:17

## 2024-01-01 RX ADMIN — ACETAMINOPHEN 500MG 400 MILLIGRAM(S): 500 TABLET, COATED ORAL at 14:14

## 2024-01-01 RX ADMIN — Medication 4 MILLIGRAM(S): at 12:42

## 2024-01-01 RX ADMIN — Medication 1 MILLIGRAM(S): at 18:38

## 2024-01-01 RX ADMIN — METOPROLOL TARTRATE 2.5 MILLIGRAM(S): 100 TABLET, FILM COATED ORAL at 07:20

## 2024-01-01 RX ADMIN — Medication 300 MILLIGRAM(S): at 18:30

## 2024-01-01 RX ADMIN — Medication 2 MILLIGRAM(S): at 15:48

## 2024-04-15 ENCOUNTER — INPATIENT (INPATIENT)
Facility: HOSPITAL | Age: 54
LOS: 7 days | Discharge: HOME | End: 2024-04-23
Attending: STUDENT IN AN ORGANIZED HEALTH CARE EDUCATION/TRAINING PROGRAM | Admitting: STUDENT IN AN ORGANIZED HEALTH CARE EDUCATION/TRAINING PROGRAM
Payer: MEDICAID

## 2024-04-15 VITALS
TEMPERATURE: 98 F | RESPIRATION RATE: 20 BRPM | SYSTOLIC BLOOD PRESSURE: 111 MMHG | OXYGEN SATURATION: 94 % | DIASTOLIC BLOOD PRESSURE: 73 MMHG | HEART RATE: 100 BPM

## 2024-04-15 DIAGNOSIS — D64.9 ANEMIA, UNSPECIFIED: ICD-10-CM
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
## 2024-04-15 DIAGNOSIS — Z29.9 ENCOUNTER FOR PROPHYLACTIC MEASURES, UNSPECIFIED: ICD-10-CM

## 2024-04-15 DIAGNOSIS — C34.90 MALIGNANT NEOPLASM OF UNSPECIFIED PART OF UNSPECIFIED BRONCHUS OR LUNG: ICD-10-CM

## 2024-04-15 DIAGNOSIS — J96.01 ACUTE RESPIRATORY FAILURE WITH HYPOXIA: ICD-10-CM

## 2024-04-15 LAB
ADD ON TEST-SPECIMEN IN LAB: SIGNIFICANT CHANGE UP
ALBUMIN SERPL ELPH-MCNC: 4 G/DL — SIGNIFICANT CHANGE UP (ref 3.3–5)
ALP SERPL-CCNC: 86 U/L — SIGNIFICANT CHANGE UP (ref 40–120)
ALT FLD-CCNC: 10 U/L — SIGNIFICANT CHANGE UP (ref 4–41)
ANION GAP SERPL CALC-SCNC: 15 MMOL/L — HIGH (ref 7–14)
APTT BLD: 30.7 SEC — SIGNIFICANT CHANGE UP (ref 24.5–35.6)
AST SERPL-CCNC: 25 U/L — SIGNIFICANT CHANGE UP (ref 4–40)
BASOPHILS # BLD AUTO: 0.02 K/UL — SIGNIFICANT CHANGE UP (ref 0–0.2)
BASOPHILS NFR BLD AUTO: 0.3 % — SIGNIFICANT CHANGE UP (ref 0–2)
BILIRUB SERPL-MCNC: 0.7 MG/DL — SIGNIFICANT CHANGE UP (ref 0.2–1.2)
BUN SERPL-MCNC: 7 MG/DL — SIGNIFICANT CHANGE UP (ref 7–23)
CALCIUM SERPL-MCNC: 9.2 MG/DL — SIGNIFICANT CHANGE UP (ref 8.4–10.5)
CHLORIDE SERPL-SCNC: 102 MMOL/L — SIGNIFICANT CHANGE UP (ref 98–107)
CO2 SERPL-SCNC: 21 MMOL/L — LOW (ref 22–31)
CREAT SERPL-MCNC: 0.8 MG/DL — SIGNIFICANT CHANGE UP (ref 0.5–1.3)
EGFR: 106 ML/MIN/1.73M2 — SIGNIFICANT CHANGE UP
EOSINOPHIL # BLD AUTO: 0.05 K/UL — SIGNIFICANT CHANGE UP (ref 0–0.5)
EOSINOPHIL NFR BLD AUTO: 0.7 % — SIGNIFICANT CHANGE UP (ref 0–6)
GLUCOSE SERPL-MCNC: 101 MG/DL — HIGH (ref 70–99)
HCT VFR BLD CALC: 36.6 % — LOW (ref 39–50)
HGB BLD-MCNC: 12.5 G/DL — LOW (ref 13–17)
IANC: 4.9 K/UL — SIGNIFICANT CHANGE UP (ref 1.8–7.4)
IMM GRANULOCYTES NFR BLD AUTO: 0.1 % — SIGNIFICANT CHANGE UP (ref 0–0.9)
INR BLD: 1 RATIO — SIGNIFICANT CHANGE UP (ref 0.85–1.18)
LYMPHOCYTES # BLD AUTO: 1.54 K/UL — SIGNIFICANT CHANGE UP (ref 1–3.3)
LYMPHOCYTES # BLD AUTO: 22 % — SIGNIFICANT CHANGE UP (ref 13–44)
MCHC RBC-ENTMCNC: 29.9 PG — SIGNIFICANT CHANGE UP (ref 27–34)
MCHC RBC-ENTMCNC: 34.2 GM/DL — SIGNIFICANT CHANGE UP (ref 32–36)
MCV RBC AUTO: 87.6 FL — SIGNIFICANT CHANGE UP (ref 80–100)
MONOCYTES # BLD AUTO: 0.49 K/UL — SIGNIFICANT CHANGE UP (ref 0–0.9)
MONOCYTES NFR BLD AUTO: 7 % — SIGNIFICANT CHANGE UP (ref 2–14)
NEUTROPHILS # BLD AUTO: 4.9 K/UL — SIGNIFICANT CHANGE UP (ref 1.8–7.4)
NEUTROPHILS NFR BLD AUTO: 69.9 % — SIGNIFICANT CHANGE UP (ref 43–77)
NRBC # BLD: 0 /100 WBCS — SIGNIFICANT CHANGE UP (ref 0–0)
NRBC # FLD: 0 K/UL — SIGNIFICANT CHANGE UP (ref 0–0)
NT-PROBNP SERPL-SCNC: 78 PG/ML — SIGNIFICANT CHANGE UP
PLATELET # BLD AUTO: 275 K/UL — SIGNIFICANT CHANGE UP (ref 150–400)
POTASSIUM SERPL-MCNC: 4.4 MMOL/L — SIGNIFICANT CHANGE UP (ref 3.5–5.3)
POTASSIUM SERPL-SCNC: 4.4 MMOL/L — SIGNIFICANT CHANGE UP (ref 3.5–5.3)
PROCALCITONIN SERPL-MCNC: 0.03 NG/ML — SIGNIFICANT CHANGE UP (ref 0.02–0.1)
PROT SERPL-MCNC: 6.7 G/DL — SIGNIFICANT CHANGE UP (ref 6–8.3)
PROTHROM AB SERPL-ACNC: 11.2 SEC — SIGNIFICANT CHANGE UP (ref 9.5–13)
RBC # BLD: 4.18 M/UL — LOW (ref 4.2–5.8)
RBC # FLD: 14.2 % — SIGNIFICANT CHANGE UP (ref 10.3–14.5)
SODIUM SERPL-SCNC: 138 MMOL/L — SIGNIFICANT CHANGE UP (ref 135–145)
TROPONIN T, HIGH SENSITIVITY RESULT: <6 NG/L — SIGNIFICANT CHANGE UP
WBC # BLD: 7.01 K/UL — SIGNIFICANT CHANGE UP (ref 3.8–10.5)
WBC # FLD AUTO: 7.01 K/UL — SIGNIFICANT CHANGE UP (ref 3.8–10.5)

## 2024-04-15 PROCEDURE — 71275 CT ANGIOGRAPHY CHEST: CPT | Mod: 26,MC

## 2024-04-15 PROCEDURE — 99223 1ST HOSP IP/OBS HIGH 75: CPT

## 2024-04-15 PROCEDURE — 71045 X-RAY EXAM CHEST 1 VIEW: CPT | Mod: 26

## 2024-04-15 PROCEDURE — 99285 EMERGENCY DEPT VISIT HI MDM: CPT

## 2024-04-15 RX ORDER — INFLUENZA VIRUS VACCINE 15; 15; 15; 15 UG/.5ML; UG/.5ML; UG/.5ML; UG/.5ML
0.5 SUSPENSION INTRAMUSCULAR ONCE
Refills: 0 | Status: DISCONTINUED | OUTPATIENT
Start: 2024-04-15 | End: 2024-04-23

## 2024-04-15 RX ORDER — ONDANSETRON 8 MG/1
4 TABLET, FILM COATED ORAL EVERY 8 HOURS
Refills: 0 | Status: DISCONTINUED | OUTPATIENT
Start: 2024-04-15 | End: 2024-04-23

## 2024-04-15 RX ORDER — LANOLIN ALCOHOL/MO/W.PET/CERES
3 CREAM (GRAM) TOPICAL AT BEDTIME
Refills: 0 | Status: DISCONTINUED | OUTPATIENT
Start: 2024-04-15 | End: 2024-04-16

## 2024-04-15 RX ORDER — ACETAMINOPHEN 500 MG
1000 TABLET ORAL ONCE
Refills: 0 | Status: COMPLETED | OUTPATIENT
Start: 2024-04-15 | End: 2024-04-15

## 2024-04-15 RX ORDER — ACETAMINOPHEN 500 MG
650 TABLET ORAL EVERY 6 HOURS
Refills: 0 | Status: DISCONTINUED | OUTPATIENT
Start: 2024-04-15 | End: 2024-04-23

## 2024-04-15 RX ORDER — ENOXAPARIN SODIUM 100 MG/ML
40 INJECTION SUBCUTANEOUS EVERY 24 HOURS
Refills: 0 | Status: COMPLETED | OUTPATIENT
Start: 2024-04-15 | End: 2024-04-16

## 2024-04-15 RX ORDER — LANOLIN ALCOHOL/MO/W.PET/CERES
3 CREAM (GRAM) TOPICAL AT BEDTIME
Refills: 0 | Status: DISCONTINUED | OUTPATIENT
Start: 2024-04-15 | End: 2024-04-15

## 2024-04-15 RX ADMIN — ENOXAPARIN SODIUM 40 MILLIGRAM(S): 100 INJECTION SUBCUTANEOUS at 22:06

## 2024-04-15 RX ADMIN — Medication 3 MILLIGRAM(S): at 22:06

## 2024-04-15 RX ADMIN — Medication 400 MILLIGRAM(S): at 15:32

## 2024-04-15 NOTE — H&P ADULT - NSHPLABSRESULTS_GEN_ALL_CORE
12.5   7.01  )-----------( 275      ( 15 Apr 2024 11:37 )             36.6     Hgb Trend: 12.5<--  04-15    138  |  102  |  7   ----------------------------<  101<H>  4.4   |  21<L>  |  0.80    Ca    9.2      15 Apr 2024 11:37    TPro  6.7  /  Alb  4.0  /  TBili  0.7  /  DBili  x   /  AST  25  /  ALT  10  /  AlkPhos  86  04-15    Creatinine Trend: 0.80<--  PT/INR - ( 15 Apr 2024 11:37 )   PT: 11.2 sec;   INR: 1.00 ratio         PTT - ( 15 Apr 2024 11:37 )  PTT:30.7 sec      Urinalysis Basic - ( 15 Apr 2024 11:37 )    Color: x / Appearance: x / SG: x / pH: x  Gluc: 101 mg/dL / Ketone: x  / Bili: x / Urobili: x   Blood: x / Protein: x / Nitrite: x   Leuk Esterase: x / RBC: x / WBC x   Sq Epi: x / Non Sq Epi: x / Bacteria: x      Labs were reviewed by me and they were notable for anemia and metabolic acidosis       CT surgery was consulted and they stated that they will look through the CTA chest to see if they can offer anything to relieve the obstruction.

## 2024-04-15 NOTE — ED ADULT NURSE REASSESSMENT NOTE - NS ED NURSE REASSESS COMMENT FT1
Report received from break MARYBETH Sanabria. Pt is A&Ox4, appears comfortable in stretcher, offers no new complaints at this time. SpO2 maintaining above 95% on 4L nasal cannula. Plan of care ongoing. Stretcher set in lowest position, call bell within reach, safety maintained.

## 2024-04-15 NOTE — ED PROVIDER NOTE - OBJECTIVE STATEMENT
52 y/o M with pmhx of small cell lung ca who presents to the ED c/o worsening LANDRUM x 3 days. Pt also reports associated orthopnea. Pt was recently diagnosed with stage 2 small cell lung ca a month ago, following with [facility] with Dr. Bowen Avalos and has not yet started on chemo tx. pt also notes increased dysphagia over the last 3 days, able to tolerate PO intake however with increased difficulty. pt reports that mass has been noted to be extending up into the right side of his neck, endorses it did not get bigger in size. Denies fever/chills, CP, palpitations, abd pain, N/V, leg swelling or cramping, cough/hemoptysis. NKDA. Former smoker.
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
## 2024-04-15 NOTE — H&P ADULT - ASSESSMENT
52 yo M smoker with Pmhx of newly diagnosed small cell lung cancer presents to the ED with worsening SOB, most likely due to lung cancer causing obstructive pathology.

## 2024-04-15 NOTE — ED PROVIDER NOTE - ATTENDING APP SHARED VISIT CONTRIBUTION OF CARE
52yo M recently diagnosed with lung cancer and right sided neck mass/lymphadenopathy, following at [facility] has not yest started chemo, pw  2-3 days worsening LANDRUM, no chest pain  also having trouble swallowing pills, but able to tolerate PO, no cough or fevers  on exam pt hypoxic on RA requiring 2-4L NC  diminished breath sounds on right side  will ro PTX, PTA, PE, PLEFF  labs, CT

## 2024-04-15 NOTE — H&P ADULT - PROBLEM SELECTOR PLAN 2
Patient with newly diagnosed SCLC with possible mets   -[place] [facility] [place]Offered Immunotherapy and chemo as a palliative measure. Now pt is seeking second opinion   -Heme/onc needs to be consulted in the AM   -CT surg consulted for evaluation of mediastinal mass   -

## 2024-04-15 NOTE — ED PROVIDER NOTE - PHYSICAL EXAMINATION
General: Well appearing in no acute distress, alert and cooperative  Eyes: PERRLA, no conjunctival injection, no scleral icterus  ENMT: Moist mucous membranes, oropharynx clear. Uvula midline.   Neck: Soft and supple, full ROM without pain, no midline tenderness. +right sided anterior neck mass. trachea midline. No tracheal rock tenderness.   Cardiac: Regular rate and regular rhythm, no murmurs, peripheral pulses 2+ and symmetric in all extremities, no LE edema.  Resp: Airway patent. Unlabored respiratory effort, diminished breath sounds on right base. No wheezes  Abd: Soft, non-tender, non-distended, no guarding or rebound tenderness  [facility]: Spine midline and non-tender  Skin: Warm and dry, no rashes/abrasions/lacerations  Neuro: AO x 3, moves all extremities symmetrically, Motor strength 5/5 bilaterally UE and LE, sensation grossly intact. Normal gait.

## 2024-04-15 NOTE — H&P ADULT - HISTORY OF PRESENT ILLNESS
54 yo M smoker with Pmhx of newly diagnosed small cell lung cancer presents to the ED with worsening SOB. Patient reports that for the last 3-4 days, he has been feeling SOB. He gets SOB with exertion and walking. He denies any chest pain, palpitations, hemoptysis or LE edema. He has mild, productive cough. He [place] [facility] [place] showed SCLC. PET scan showed multiple lymphadenopathy in the chest cavity. He was told his prognosis is poor and with chemo and immunotherapy but he is now interested in second opinion.   In the ED, his vitals were notable for tachycardia and hypoxia. Labs were notable for mild anemia, metabolic acidosis with AG.  CTA chest showed 5.3 x 5.1 cm right paramediastinal mass with extension into mediastinum. Increased soft tissue density in the mediastinum and sreekanth, causing obstruction of the right upper lobe bronchus and pulmonary artery and significant narrowing of right main stem bronchus and left inferior pulmonary vein 52 yo M smoker with Pmhx of newly diagnosed small cell lung cancer presents to the ED with worsening SOB. Patient reports that for the last 3-4 days, he has been feeling SOB. He gets SOB with exertion and walking. He denies any chest pain, palpitations, hemoptysis or LE edema. He has mild, productive cough. He underwent bx at [facility] [place] which showed SCLC. PET scan showed multiple lymphadenopathy in the chest cavity. He was told his prognosis is poor and with chemo and immunotherapy but he is now interested in second opinion.   In the ED, his vitals were notable for tachycardia and hypoxia. Labs were notable for mild anemia, metabolic acidosis with AG.  CTA chest showed 5.3 x 5.1 cm right paramediastinal mass with extension into mediastinum increased soft tissue density in the mediastinum and sreekanth, causing obstruction of the right upper lobe bronchus and pulmonary artery and significant narrowing of right main stem bronchus and left inferior pulmonary vein.

## 2024-04-15 NOTE — ED ADULT NURSE NOTE - NSFALLUNIVINTERV_ED_ALL_ED
Bed/Stretcher in lowest position, wheels locked, appropriate side rails in place/Call bell, personal items and telephone in reach/Instruct patient to call for assistance before getting out of bed/chair/stretcher/Non-slip footwear applied when patient is off stretcher/[place] to call system/Physically safe environment - no spills, clutter or unnecessary equipment/Purposeful proactive rounding/Room/bathroom lighting operational, light cord in reach

## 2024-04-15 NOTE — H&P ADULT - NSHPPHYSICALEXAM_GEN_ALL_CORE
Vital Signs Last 24 Hrs  T(C): 36.7 (15 Apr 2024 17:00), Max: 36.9 (15 Apr 2024 15:59)  T(F): 98.1 (15 Apr 2024 17:00), Max: 98.5 (15 Apr 2024 15:59)  HR: 102 (15 Apr 2024 17:00) (91 - 102)  BP: 107/68 (15 Apr 2024 17:00) (107/68 - 118/73)  BP(mean): --  RR: 18 (15 Apr 2024 17:00) (18 - 22)  SpO2: 95% (15 Apr 2024 17:00) (92% - 95%)    Parameters below as of 15 Apr 2024 17:00  Patient On (Oxygen Delivery Method): nasal cannula  O2 Flow (L/min): 4  GENERAL: NAD, well-developed  HEENT:  Atraumatic, Normocephalic ,Conjunctiva and sclera clear, oral mucosa moist, clear w/o any exudate   NECK: R supraclavicular mass, Nontender to palpation.   CHEST/LUNG: Clear to auscultation bilaterally; No wheeze  HEART: Regular rate and rhythm; No murmurs, rubs, or gallops  ABDOMEN: Soft, Nontender, Nondistended; Bowel sounds present  EXTREMITIES:  2+ Peripheral Pulses, No clubbing, cyanosis, or edema  PSYCH: AAOx3  NEUROLOGY: non-focal, moving all extremities   SKIN: No rashes or lesions

## 2024-04-15 NOTE — H&P ADULT - PROBLEM SELECTOR PLAN 1
Patient with acute onset of respiratory failure with hypoxia   -CTA chest showed 5.3 x 5.1 cm right paramediastinal mass with extension into mediastinum increased soft tissue density in the mediastinum and sreekanth, causing obstruction of the right upper lobe bronchus and pulmonary artery and significant narrowing of right main stem bronchus and left inferior pulmonary vein  -Most likely causing pt's hypoxia   -No fever, leukocytosis or worsening productive cough. Low suspicion for postobstructive PNA, monitor off abx   -F/u with flu and COVID  -C/w O2 supplementation

## 2024-04-15 NOTE — ED ADULT NURSE NOTE - OBJECTIVE STATEMENT
Pt received to room 19. Pt is A&Ox3, ambulatory, Hx of lung cancer. Pt presents to ED c/o worsening SOB x 3 days. airway patent, speaking in full sentences. denies chest pain, headache, dizziness, abdominal pain, n/v/d, urinary symptoms, fevers/chills, numbness/tingling. breathing is even and unlabored. VS as noted. skin is intact. left AC 20G IV placed, +blood return, flushes without difficulty. labs collected and sent. awaiting imaging. stretcher set in lowest position, call bell within reach, safety maintained.

## 2024-04-15 NOTE — ED PROVIDER NOTE - CLINICAL SUMMARY MEDICAL DECISION MAKING FREE TEXT BOX
Patient currently afebrile, hemodynamically stable, spO2 94% on 6L NC, was noted to be hypoxic to 84% on RA on arrival to the ED. Based on history and physical, differentials include but are not limited to PE vs neck mass/lymphadenopathy vs ptx vs pleural effusion 2/2 mass. Plan to assess patient for acute pathology as listed above with labs, CT. Will administer medications for symptomatic relief, follow-up on results, and likely admit given new supplemental O2 requirement.

## 2024-04-15 NOTE — ED ADULT TRIAGE NOTE - CHIEF COMPLAINT QUOTE
c/o SOB for the past 3 dyas worse this morning, diagnosed with lung CA not currently on chemo or radiation, endorses weight loss and weakness. note dot have O2 sat of 85% RA, placed on NC at 6 LPM with O 2 sat improvement  to 94%. Able to speak in full and complete sentences.

## 2024-04-15 NOTE — ED PROVIDER NOTE - PROGRESS NOTE DETAILS
ABEL Red: CT with findings of 5x3cm right paramediastinal mass with compression onto right mainstem bronchus and pulm artery. Pt currently on 4L NC, reports some improvement in SOB. While on 4L NC, pt sat 92%. Spoke with Dr. Clay given new requirement for supplemental O2, possible PT given increased weakness, consider possible pulm stent placement.

## 2024-04-16 ENCOUNTER — TRANSCRIPTION ENCOUNTER (OUTPATIENT)
Age: 54
End: 2024-04-16

## 2024-04-16 DIAGNOSIS — R13.10 DYSPHAGIA, UNSPECIFIED: ICD-10-CM

## 2024-04-16 LAB
FLUAV AG NPH QL: SIGNIFICANT CHANGE UP
FLUBV AG NPH QL: SIGNIFICANT CHANGE UP
RSV RNA NPH QL NAA+NON-PROBE: SIGNIFICANT CHANGE UP
SARS-COV-2 RNA SPEC QL NAA+PROBE: SIGNIFICANT CHANGE UP

## 2024-04-16 PROCEDURE — 99233 SBSQ HOSP IP/OBS HIGH 50: CPT

## 2024-04-16 RX ORDER — IPRATROPIUM/ALBUTEROL SULFATE 18-103MCG
3 AEROSOL WITH ADAPTER (GRAM) INHALATION EVERY 6 HOURS
Refills: 0 | Status: DISCONTINUED | OUTPATIENT
Start: 2024-04-16 | End: 2024-04-23

## 2024-04-16 RX ORDER — LANOLIN ALCOHOL/MO/W.PET/CERES
6 CREAM (GRAM) TOPICAL AT BEDTIME
Refills: 0 | Status: DISCONTINUED | OUTPATIENT
Start: 2024-04-16 | End: 2024-04-23

## 2024-04-16 RX ORDER — POLYETHYLENE GLYCOL 3350 17 G/17G
17 POWDER, FOR SOLUTION ORAL DAILY
Refills: 0 | Status: DISCONTINUED | OUTPATIENT
Start: 2024-04-17 | End: 2024-04-23

## 2024-04-16 RX ORDER — SODIUM CHLORIDE 0.65 %
1 AEROSOL, SPRAY (ML) NASAL ONCE
Refills: 0 | Status: COMPLETED | OUTPATIENT
Start: 2024-04-16 | End: 2024-04-16

## 2024-04-16 RX ADMIN — Medication 3 MILLILITER(S): at 22:13

## 2024-04-16 RX ADMIN — Medication 100 MILLIGRAM(S): at 21:57

## 2024-04-16 RX ADMIN — Medication 3 MILLILITER(S): at 12:10

## 2024-04-16 RX ADMIN — Medication 100 MILLIGRAM(S): at 14:40

## 2024-04-16 RX ADMIN — Medication 6 MILLIGRAM(S): at 21:57

## 2024-04-16 RX ADMIN — ENOXAPARIN SODIUM 40 MILLIGRAM(S): 100 INJECTION SUBCUTANEOUS at 21:57

## 2024-04-16 NOTE — CHART NOTE - NSCHARTNOTEFT_GEN_A_CORE
INTERVENTIONAL PULMONARY    Chart and imaging reviewed, discussed with thoracic surgery. Will plan for flexible bronchoscopy with possible stent placement on Thurs 4/18 with Dr. Goldberg/Dr. Mcintosh.  Discussed with Dr. Goldberg.      Full consult note to follow tomorrow.      Sondra Perez MD  Pulmonary and Critical Care Fellow

## 2024-04-16 NOTE — DISCHARGE NOTE PROVIDER - NSDCCPCAREPLAN_GEN_ALL_CORE_FT
PRINCIPAL DISCHARGE DIAGNOSIS  Diagnosis: Acute respiratory failure with hypoxia  Assessment and Plan of Treatment: You came for shortness of breath and were found to have a paramediastinal mass on imaging with extension into mediastinum with concerns for obstruction. You required Oxygen while you were in the hospital and were seen by CTS and Interventional Pulmonary.  You underwent a flexible bronchoscopy on 4/18 with stent placement.        SECONDARY DISCHARGE DIAGNOSES  Diagnosis: Lung cancer  Assessment and Plan of Treatment: You were evaluated by Oncology, CTS and Interventional Pulmonary while you were in the hospital, you underwent a flexible bronchoscopy on 4/18 with stent placement. Inpatient chemotherapy was offered XXXXX     PRINCIPAL DISCHARGE DIAGNOSIS  Diagnosis: Acute respiratory failure with hypoxia  Assessment and Plan of Treatment: You came for shortness of breath and were found to have a paramediastinal mass on imaging with extension into mediastinum with concerns for obstruction. You required Oxygen while you were in the hospital and were seen by CTS, Oncology and Interventional Pulmonary. It was decided to start inpatient chemotherapy first prior to any interventions given the findings on CT scan.      SECONDARY DISCHARGE DIAGNOSES  Diagnosis: Lung cancer  Assessment and Plan of Treatment: You were evaluated by Oncology, CTS and Interventional Pulmonary while you were in the hospital. It was decided to start inpatient chemotherapy first prior to any interventions given the findings on CT scan.  - Please follow up with Oncology upon discharge for further treatment of the cancer

## 2024-04-16 NOTE — DISCHARGE NOTE PROVIDER - NSDCMRMEDTOKEN_GEN_ALL_CORE_FT
allopurinol 300 mg oral tablet: 1 tab(s) orally once a day  ascorbic acid 500 mg oral tablet: 1 tab(s) orally once a day  ferrous sulfate 325 mg (65 mg elemental iron) oral tablet: 1 tab(s) orally once a day  fluticasone 50 mcg/inh nasal spray: 1 spray(s) nasal 2 times a day  loratadine 10 mg oral tablet: 1 tab(s) orally once a day  melatonin 3 mg oral tablet: 2 tab(s) orally once a day (at bedtime) as needed for  insomnia  Multiple Vitamins oral tablet: 1 tab(s) orally once a day  Neupogen SingleJect 480 mcg/0.8 mL injectable solution: 480 microgram(s) subcutaneously once a day  polyethylene glycol 3350 oral powder for reconstitution: 17 gram(s) orally once a day

## 2024-04-16 NOTE — PROGRESS NOTE ADULT - SUBJECTIVE AND OBJECTIVE BOX
Patient is a 53y old  Male who presents with a chief complaint of SOB (16 Apr 2024 13:57)      INTERVAL HPI/OVERNIGHT EVENTS:  Seen by me this morning, wife Angella at bedside. Sitting in chair, c/o SOB, cough productive of white sputum, difficulty swallowing d/t mass on R side of neck. +Weight loss (intentional as he has been fasting since cancer dx). +R shoulder pain. Insomnia.    Review of Systems: 12 point review of systems otherwise negative    MEDICATIONS  (STANDING):  albuterol/ipratropium for Nebulization 3 milliLiter(s) Nebulizer every 6 hours  benzonatate 100 milliGRAM(s) Oral every 8 hours  enoxaparin Injectable 40 milliGRAM(s) SubCutaneous every 24 hours  guaiFENesin ER 1200 milliGRAM(s) Oral every 12 hours  influenza   Vaccine 0.5 milliLiter(s) IntraMuscular once  melatonin 6 milliGRAM(s) Oral at bedtime    MEDICATIONS  (PRN):  acetaminophen     Tablet .. 650 milliGRAM(s) Oral every 6 hours PRN Temp greater or equal to 38C (100.4F), Mild Pain (1 - 3)  aluminum hydroxide/magnesium hydroxide/simethicone Suspension 30 milliLiter(s) Oral every 4 hours PRN Dyspepsia  ondansetron Injectable 4 milliGRAM(s) IV Push every 8 hours PRN Nausea and/or Vomiting  oxycodone    5 mG/acetaminophen 325 mG 1 Tablet(s) Oral every 4 hours PRN Moderate Pain to Severe Pain (4-10)      Allergies    No Known Allergies    Intolerances          Vital Signs Last 24 Hrs  T(C): 36.7 (16 Apr 2024 13:57), Max: 36.9 (15 Apr 2024 15:59)  T(F): 98.1 (16 Apr 2024 13:57), Max: 98.5 (15 Apr 2024 15:59)  HR: 103 (16 Apr 2024 13:57) (91 - 105)  BP: 105/73 (16 Apr 2024 13:57) (101/68 - 118/73)  BP(mean): --  RR: 18 (16 Apr 2024 13:57) (18 - 20)  SpO2: 94% (16 Apr 2024 13:57) (94% - 98%)    Parameters below as of 16 Apr 2024 05:25  Patient On (Oxygen Delivery Method): nasal cannula  O2 Flow (L/min): 4    CAPILLARY BLOOD GLUCOSE            Physical Exam:    Daily Height in cm: 187.96 (15 Apr 2024 20:28)    Daily   General:  Well appearing, NAD, not cachetic, on 4L NC  HEENT:  Nonicteric, PERRLA, R supraclavicular mass, no LAD palpated  CV:  RRR, no murmur, no JVD  Lungs:  Decreased BS on R base, o/w CTA  Abdomen:  Soft, non-tender, no distended, positive BS, no hepatosplenomegaly  Extremities:  2+ pulses, no c/c, no edema  Skin:  Warm and dry, no rashes  :  No ellis  Neuro:  AAOx3, non-focal, CN II-XII grossly intact  No Restraints    LABS:                        12.5   7.01  )-----------( 275      ( 15 Apr 2024 11:37 )             36.6     04-15    138  |  102  |  7   ----------------------------<  101<H>  4.4   |  21<L>  |  0.80    Ca    9.2      15 Apr 2024 11:37    TPro  6.7  /  Alb  4.0  /  TBili  0.7  /  DBili  x   /  AST  25  /  ALT  10  /  AlkPhos  86  04-15    PT/INR - ( 15 Apr 2024 11:37 )   PT: 11.2 sec;   INR: 1.00 ratio         PTT - ( 15 Apr 2024 11:37 )  PTT:30.7 sec  Urinalysis Basic - ( 15 Apr 2024 11:37 )    Color: x / Appearance: x / SG: x / pH: x  Gluc: 101 mg/dL / Ketone: x  / Bili: x / Urobili: x   Blood: x / Protein: x / Nitrite: x   Leuk Esterase: x / RBC: x / WBC x   Sq Epi: x / Non Sq Epi: x / Bacteria: x          RADIOLOGY & ADDITIONAL TESTS:  Reviewed by me

## 2024-04-16 NOTE — DISCHARGE NOTE PROVIDER - NSDCFUADDAPPT_GEN_ALL_CORE_FT
Continue medications as prescribed. Follow up with your primary care doctor, pulmonlogist, and oncologist for further evaluation and management. Please call to make an appointment within 1-2 weeks of discharge.     [facility]  Main Telephone Number: [phone] [address] [place], [place] [address] [place] [address]  [phone]  Fax: [phone] [address] [place] [address]

## 2024-04-16 NOTE — SWALLOW BEDSIDE ASSESSMENT ADULT - ADDITIONAL RECOMMENDATIONS
1. Consider GI consult given patient reporting "stuck sensation" at mid-chest region with pills and reporting "I feel like it's coming back up a little" also at the mid-chest region when drinking thin liquids to rule out an esophageal dysphagia component. 2. This service to follow for diet tolerance as schedule permits. 3. Medical team advised to reconsult this service if patient is with a change in medical status or change in tolerance of recommended PO.

## 2024-04-16 NOTE — CONSULT NOTE ADULT - ASSESSMENT
This is a 53 year old male with extensive-stage small cell lung cancer who presents with worsening shortness of breath.    1. ES-SCLC   -- Diagnosed via biopsy of L neck mass on 03/2024  -- PET/CT showed enlarged cervical and thoracic LAD, minimally FDG-avid RUL nodule  -- Outpatient he had declined chemoimmunotherapy and wanted to pursue homeopathic treatments, however now considering systemic therapy   -- CTA chest shows 5.3 x 5.1 cm R paramediastinal mass, increased soft tissue density in mediastinum and sreekanth causing obstruction of RUL bronchus and significant narrowing of right mainstem bronchus   -- Discussed potential plan for inpatient treatment with carboplatin + etoposide + atezolizumab, pt states he will think about it   -- Will continue to follow inpatient per his request   -- After discharge, follow up with Dr. Bowen Avalos at [facility].    2. Shortness of breath   -- CTA as above   -- Thoracic consulted, awaiting recommendations   -- May plan for inpatient treatment as above if pt agreeable     Will continue to follow.    Mary Singh PA-C  Hematology/Oncology  [facility]  [phone] (office)  [phone] (alt office)  Evenings and weekends please call MD on call or office

## 2024-04-16 NOTE — PROGRESS NOTE ADULT - ASSESSMENT
54 yo M smoker with Pmhx of newly diagnosed small cell lung cancer presents to the ED with worsening SOB, most likely due to lung cancer causing obstructive pathology.

## 2024-04-16 NOTE — SWALLOW BEDSIDE ASSESSMENT ADULT - ASR SWALLOW RECOMMEND DIAG
Consider a cinesophagram at MD's discretion given patient with baseline cough to determine if cough is dysphagia/ aspiration related./VFSS/MBS

## 2024-04-16 NOTE — DISCHARGE NOTE PROVIDER - CARE PROVIDER_API CALL
Fran Flores  Pulmonary Disease  [address] [address], [facility] [address]  [place] [address]  Phone: [phone]  Fax: [phone]  Follow Up Time:

## 2024-04-16 NOTE — DISCHARGE NOTE PROVIDER - NSDCFUSCHEDAPPT_GEN_ALL_CORE_FT
Francine Rolle  [address]  Scheduled Appointment: 04/24/2024    Fran Flores  [address]  Scheduled Appointment: 06/04/2024
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
## 2024-04-16 NOTE — CHART NOTE - NSCHARTNOTEFT_GEN_A_CORE
pt seen and examined w Dr Bruno  Recommend:  will speak w IP Dr Goldberg re: tracheal stent  heme onc to see pt  rad onc to see pt      Stephanie ROMAN [id number]

## 2024-04-16 NOTE — SWALLOW BEDSIDE ASSESSMENT ADULT - SWALLOW EVAL: DIAGNOSIS
1. Functional oral stage for puree, regular solids and thin liquids characterized by adequate oral acceptance and containment, adequate mastication of regular solids, adequate anterior to posterior transport and adequate oral clearance. 2. Functional pharyngeal stage suspected for the aforementioned consistencies characterized by initiation of the pharyngeal swallow and hyolaryngeal excursion upon digital palpation with no overt s/s penetration/ aspiration noted. Of Note: Patient reported, "I feel like it comes back up a little" and pointing to mid-chest during thin liquid trials.

## 2024-04-16 NOTE — DISCHARGE NOTE PROVIDER - HOSPITAL COURSE
54 yo M smoker with Pmhx of newly diagnosed small cell lung cancer presents to the ED with worsening SOB and also, wanting a 2nd opinion, AHRF, most likely due to lung cancer causing obstructive pathology.    ·  Problem: Acute respiratory failure with hypoxia.   ·  Plan: Patient with acute onset of respiratory failure with hypoxia   -CTA chest showed 5.3 x 5.1 cm right paramediastinal mass with extension into mediastinum increased soft tissue density in the mediastinum and sreekanth, causing obstruction of the right upper lobe bronchus and pulmonary artery and significant narrowing of right main stem bronchus and left inferior pulmonary vein  -No fever, leukocytosis or worsening productive cough. Low suspicion for postobstructive PNA, monitor off abx   - Limited RVP is negative  - C/w O2 supplementation, currently on HFNC 40L/50%, wean off O2 as tolerated  - Duonebs ATC, c/w tessalon ATC, robitussin prn, c/w xanax prn for anxiety and may assist with SOB  - C/w Percocet for cancer related pain (R shoulder pain likely from paramediastinal mass), c/w bowel regimen  - CTS recs noted, care referred to Interventional Pulm, dorcasrec recs  - Initially was planned for rigid bronchoscopy, tumor debulking, stent placement, however plan now has changed to start inpatient chemo ASAP  - Interv Pulm will wait to assess treatment response prior to proceeding with stent placement  - If any worsening respiratory symptoms/status noted IP will proceed with airway intervention.    ·  Problem: Small cell lung cancer.   ·  Plan: Patient with newly diagnosed SCLC with mets (seen at [facility] [place]), here for 2nd opinion  - Pt underwent PET scan at [facility] [place]  - Seen by [facility], dorcasrec recs, extensive-stage small cell lung cancer  - Diagnosed via biopsy of R neck mass on 03/2024  - PET/CT showed enlarged cervical and thoracic LAD, minimally FDG-avid RUL nodule  - Outpatient he had declined chemoimmunotherapy and wanted to pursue homeopathic treatments  - D/w House Onc, marianne recs and d/w them, [facility]  - Outpatient f/up will likely be with ZCC  - Seen by Rad-Onc, marianne recs, no role for any palliative RT now without upfront chemo  - Starting chemo 4/19, appjuvenal Onc recs  - Inpatient chemo w/ carboplatin D1 + etoposide D1-3 starting 04/19  - F/up TLS labs daily, f/up G6PD and uric acid levels  - Starting daily allopurinol  - Growth factor after chemo completed, neupogen started day after chemo complete (day 4) daily for 5 days.    ·  Problem: Dysphagia.   ·  Plan: C/o difficulty swallowing, no odynophagia; 30lb weight loss (intentional as he has been fasting since cancer dx)  - Likely related to compression from R paramediastinal mass  - SS Eval apprec, s/p MBS, recs are for Regular Solids/ Thin Liquids, SMALL sips  - Dietitian eval apprec, added MVI.    ·  Problem: Anemia.   ·  Plan: Hgb is mildly decreased to 12 on admission  - No reports of bleeding  - Iron studies suggestive of MARCELINO and AOCD, c/w daily oral iron  - Trend Hgb daily, keep HG above 7.    PT eval --> Outpatient PT     52 yo M smoker with Pmhx of newly diagnosed small cell lung cancer presents to the ED with worsening SOB and also, wanting a 2nd opinion, AHRF, most likely due to lung cancer causing obstructive pathology.    ·  Problem: Acute respiratory failure with hypoxia.   ·  Plan: Patient with acute onset of respiratory failure with hypoxia   -CTA chest showed 5.3 x 5.1 cm right paramediastinal mass with extension into mediastinum increased soft tissue density in the mediastinum and sreekanth, causing obstruction of the right upper lobe bronchus and pulmonary artery and significant narrowing of right main stem bronchus and left inferior pulmonary vein  -No fever, leukocytosis or worsening productive cough. Low suspicion for postobstructive PNA, monitor off abx   - Limited RVP is negative  - C/w O2 supplementation, currently on HFNC 40L/50%, wean off O2 as tolerated  - Duonebs ATC, c/w tessalon ATC, robitussin prn, c/w xanax prn for anxiety and may assist with SOB  - C/w Percocet for cancer related pain (R shoulder pain likely from paramediastinal mass), c/w bowel regimen  - CTS recs noted, care referred to Interventional Pulm, dorcasrec recs  - Initially was planned for rigid bronchoscopy, tumor debulking, stent placement, however plan now has changed to start inpatient chemo ASAP  - Interv Pulm will wait to assess treatment response prior to proceeding with stent placement  - If any worsening respiratory symptoms/status noted IP will proceed with airway intervention.    ·  Problem: Small cell lung cancer.   ·  Plan: Patient with newly diagnosed SCLC with mets (seen at [facility] in [place]), here for 2nd opinion  - Pt underwent PET scan at [facility]  - Seen by [facility], dorcasrec recs, extensive-stage small cell lung cancer  - Diagnosed via biopsy of R neck mass on 03/2024  - PET/CT showed enlarged cervical and thoracic LAD, minimally FDG-avid RUL nodule  - Outpatient he had declined chemoimmunotherapy and wanted to pursue homeopathic treatments  - D/w House Onc, marianne recs and d/w them, [facility]  - Outpatient f/up will likely be with ZCC  - Seen by Rad-Onc, marianne recs, no role for any palliative RT now without upfront chemo  - Starting chemo 4/19, appjuevnal Onc recs  - Inpatient chemo w/ carboplatin D1 + etoposide D1-3 starting 04/19  - F/up TLS labs daily, f/up G6PD and uric acid levels  - Starting daily allopurinol  - Growth factor after chemo completed, neupogen started day after chemo - to be continued at home on discharge.     ·  Problem: Dysphagia.   ·  Plan: C/o difficulty swallowing, no odynophagia; 30lb weight loss (intentional as he has been fasting since cancer dx)  - Likely related to compression from R paramediastinal mass  - SS Eval apprec, s/p MBS, recs are for Regular Solids/ Thin Liquids, SMALL sips  - Dietitian eval apprec, added MVI.    ·  Problem: Anemia.   ·  Plan: Hgb is mildly decreased to 12 on admission  - No reports of bleeding  - Iron studies suggestive of MARCELINO and AOCD, c/w daily oral iron  - Trend Hgb daily, keep HG above 7.    PT eval --> Outpatient PT    Discharge diagnoses:   - SCLC   - Hypoxic respiratory failure requiring home oxygen   - Anemia  -

## 2024-04-16 NOTE — CHART NOTE - NSCHARTNOTEFT_GEN_A_CORE
alerted by RN that pt desatted to 87% on 4L nasal cannula. evaluated pt at bedside. pt denies chest pain, shortness of breath, headache, fever, chills, abdominal pain. pt requesting to stay on 6L nasal cannula at this time. pt SpO2 94% on 6L nasal cannula.     Vital Signs Last 24 Hrs  T(C): 36.6 (16 Apr 2024 19:40), Max: 36.9 (16 Apr 2024 05:25)  T(F): 97.9 (16 Apr 2024 19:40), Max: 98.4 (16 Apr 2024 05:25)  HR: 99 (16 Apr 2024 19:40) (99 - 105)  BP: 117/77 (16 Apr 2024 19:40) (101/68 - 117/77)  BP(mean): --  RR: 19 (16 Apr 2024 19:40) (18 - 19)  SpO2: 94% (16 Apr 2024 19:40) (82% - 96%)    Parameters below as of 16 Apr 2024 19:40  Patient On (Oxygen Delivery Method): nasal cannula  O2 Flow (L/min): 6    General:  Well appearing, NAD  HEENT:  Nonicteric, PERRLA, MMM  CV:  RRR, no murmur, no JVD  Lungs:  Decreased BS on R base  Abdomen:  Soft, non-tender, not distended  Extremities:  2+ pulses, no edema  Skin:  Warm and dry, no rashes  Neuro:  AAOx3, non-focal, CN II-XII grossly intact      ordered ocean spray for decongestion. pt to received scheduled duoneb from 6PM. will continue with respiratory treatments and continue to monitor overnight. pt made aware and agreeable.     Bina Shukla, [facility]  Medicine [id number]

## 2024-04-16 NOTE — SWALLOW BEDSIDE ASSESSMENT ADULT - COMMENTS
denies globus/ stuck sensation patient reported "I feel like it comes back up a little" on one occasion across thin liquid trials pointing to mid chest/ cervical esophagus region. Hospitalist Note 4/16, "54 yo M smoker with Pmhx of newly diagnosed small cell lung cancer presents to the ED with worsening SOB, most likely due to lung cancer causing obstructive pathology... Seen by me this morning, wife Angella at bedside. Sitting in chair, c/o SOB, cough productive of white sputum, difficulty swallowing d/t mass on R side of neck. +Weight loss (intentional as he has been fasting since cancer dx). +R shoulder pain. Insomnia.    CTA 4/15: IMPRESSION: No pulmonary embolism.5.3 x 5.1 cm right paramediastinal mass with extension into mediastinum. Increased soft tissue density in the mediastinum and sreekanth, causing obstruction of the right upper lobe bronchus and pulmonary artery and significant narrowing of right main stem bronchus and left inferior pulmonary vein. Axillary lymphadenopathy, likely metastatic.    Patient received OOB in bedside chair, noted with O2 via nasal cannula, wife present at bedside. Patient reports occasional "stuck sensation" with pills/ tablets in the mid-chest region and Denied "stuck sensation" in throat with pills/food/ liquids. Patient indicated "I can swallow regular food that's not this issue it's the pills." Patient with baseline cough; however, unable to determine if worsens with PO intake.

## 2024-04-16 NOTE — CHART NOTE - NSCHARTNOTEFT_GEN_A_CORE
Per Chart Review:  54 yo M smoker with reports of newly diagnosed small cell lung cancer presents to the ED with worsening SOB. Patient reports that for the last 3-4 days, he has been feeling SOB. He gets SOB with exertion and walking. He denies any chest pain, palpitations, hemoptysis or LE edema. He has mild, productive cough. He underwent bx at [facility] which showed SCLC. PET scan showed multiple lymphadenopathy in the chest cavity. He was told his prognosis is poor and with chemo and immunotherapy but he is now interested in second opinion. He was planned to start having chemo/immunotherapy but had worsening SOB x3 days with cough, thus came to the ED.  In the ED, his vitals were notable for tachycardia and hypoxia. Labs were notable for mild anemia, metabolic acidosis with AG.  CTA chest showed 5.3 x 5.1 cm right paramediastinal mass with extension into mediastinum increased soft tissue density in the mediastinum and sreekanth, causing obstruction of the right upper lobe bronchus and pulmonary artery and significant narrowing of right main stem bronchus and left inferior pulmonary vein.    In-house Heme/Onc consulted for patient-requested second opinion for SCLC.    Recommendations:  - Consult [facility] (who [facility] [place] as patient symptomatic and would likely benefit from inpatient chemotherapy. As patient was diagnosed outside of the [facility], in-house oncology unable to treat without internal pathology and records review.  - Recommend to primary team to obtain all outside records as well as pathology for internal pathology review  - Please re-consult when all records have been received    Elissa Jesus DO, MPH  Medical Oncology Fellow, PGY-8  *Can be reached via Teams, but please contact the on-call fellow after 5pm-8am on weekdays and on weekends.

## 2024-04-16 NOTE — SWALLOW BEDSIDE ASSESSMENT ADULT - ASR SWALLOW REFERRAL
patient may benefit from nutritional supplements given documented weight loss at MD's discretion/Registered Dietitian

## 2024-04-16 NOTE — DISCHARGE NOTE PROVIDER - ATTENDING DISCHARGE PHYSICAL EXAMINATION:
.  VITAL SIGNS:  T(C): 37.1 (04-23-24 @ 10:00), Max: 37.3 (04-22-24 @ 14:11)  T(F): 98.7 (04-23-24 @ 10:00), Max: 99.1 (04-22-24 @ 14:11)  HR: 95 (04-23-24 @ 10:00) (81 - 100)  BP: 105/66 (04-23-24 @ 10:00) (98/54 - 105/66)  BP(mean): 75 (04-22-24 @ 14:11) (75 - 75)  RR: 17 (04-23-24 @ 10:00) (17 - 18)  SpO2: 100% (04-23-24 @ 10:00) (95% - 100%)  Wt(kg): --    PHYSICAL EXAM:    Constitutional: Comfortable on 2L - sitting in a chair.   Head: NC/AT  Eyes: PERRL, EOMI, anicteric sclera  ENT: no nasal discharge; uvula midline, no oropharyngeal erythema or exudates; MMM  Neck: supple; no JVD or thyromegaly  Respiratory: CTA B/L; no W/R/R, no retractions  Cardiac: +S1/S2; RRR; no M/R/G; PMI non-displaced  Gastrointestinal: abdomen soft, NT/ND; no rebound or guarding; +BSx4  Back: spine midline, no bony tenderness or step-offs; no CVAT B/L  Extremities: WWP, no clubbing or cyanosis; no peripheral edema  Musculoskeletal: NROM x4; no joint swelling, tenderness or erythema  Vascular: 2+ radial, femoral, DP/PT pulses B/L  Dermatologic: skin warm, dry and intact; no rashes, wounds, or scars  Lymphatic: no submandibular or cervical LAD  Neurologic: AAOx3; CNII-XII grossly intact; no focal deficits  Psychiatric: affect and characteristics of appearance, verbalizations, behaviors are appropriate

## 2024-04-17 LAB
ANION GAP SERPL CALC-SCNC: 13 MMOL/L — SIGNIFICANT CHANGE UP (ref 7–14)
BLD GP AB SCN SERPL QL: NEGATIVE — SIGNIFICANT CHANGE UP
BUN SERPL-MCNC: 9 MG/DL — SIGNIFICANT CHANGE UP (ref 7–23)
CALCIUM SERPL-MCNC: 9.2 MG/DL — SIGNIFICANT CHANGE UP (ref 8.4–10.5)
CHLORIDE SERPL-SCNC: 103 MMOL/L — SIGNIFICANT CHANGE UP (ref 98–107)
CO2 SERPL-SCNC: 22 MMOL/L — SIGNIFICANT CHANGE UP (ref 22–31)
CREAT SERPL-MCNC: 0.78 MG/DL — SIGNIFICANT CHANGE UP (ref 0.5–1.3)
EGFR: 107 ML/MIN/1.73M2 — SIGNIFICANT CHANGE UP
FERRITIN SERPL-MCNC: 401 NG/ML — HIGH (ref 30–400)
GLUCOSE SERPL-MCNC: 103 MG/DL — HIGH (ref 70–99)
HCT VFR BLD CALC: 35.8 % — LOW (ref 39–50)
HGB BLD-MCNC: 11.8 G/DL — LOW (ref 13–17)
IRON SATN MFR SERPL: 13 % — LOW (ref 14–50)
IRON SATN MFR SERPL: 27 UG/DL — LOW (ref 45–165)
MAGNESIUM SERPL-MCNC: 1.9 MG/DL — SIGNIFICANT CHANGE UP (ref 1.6–2.6)
MCHC RBC-ENTMCNC: 29.5 PG — SIGNIFICANT CHANGE UP (ref 27–34)
MCHC RBC-ENTMCNC: 33 GM/DL — SIGNIFICANT CHANGE UP (ref 32–36)
MCV RBC AUTO: 89.5 FL — SIGNIFICANT CHANGE UP (ref 80–100)
NRBC # BLD: 0 /100 WBCS — SIGNIFICANT CHANGE UP (ref 0–0)
NRBC # FLD: 0 K/UL — SIGNIFICANT CHANGE UP (ref 0–0)
PHOSPHATE SERPL-MCNC: 3.8 MG/DL — SIGNIFICANT CHANGE UP (ref 2.5–4.5)
PLATELET # BLD AUTO: 273 K/UL — SIGNIFICANT CHANGE UP (ref 150–400)
POTASSIUM SERPL-MCNC: 4.2 MMOL/L — SIGNIFICANT CHANGE UP (ref 3.5–5.3)
POTASSIUM SERPL-SCNC: 4.2 MMOL/L — SIGNIFICANT CHANGE UP (ref 3.5–5.3)
RBC # BLD: 4 M/UL — LOW (ref 4.2–5.8)
RBC # FLD: 14.1 % — SIGNIFICANT CHANGE UP (ref 10.3–14.5)
RH IG SCN BLD-IMP: POSITIVE — SIGNIFICANT CHANGE UP
SODIUM SERPL-SCNC: 138 MMOL/L — SIGNIFICANT CHANGE UP (ref 135–145)
TIBC SERPL-MCNC: 206 UG/DL — LOW (ref 220–430)
UIBC SERPL-MCNC: 179 UG/DL — SIGNIFICANT CHANGE UP (ref 110–370)
WBC # BLD: 6.97 K/UL — SIGNIFICANT CHANGE UP (ref 3.8–10.5)
WBC # FLD AUTO: 6.97 K/UL — SIGNIFICANT CHANGE UP (ref 3.8–10.5)

## 2024-04-17 PROCEDURE — 99497 ADVNCD CARE PLAN 30 MIN: CPT | Mod: 25

## 2024-04-17 PROCEDURE — 74230 X-RAY XM SWLNG FUNCJ C+: CPT | Mod: 26

## 2024-04-17 PROCEDURE — 99223 1ST HOSP IP/OBS HIGH 75: CPT | Mod: GC

## 2024-04-17 PROCEDURE — 99233 SBSQ HOSP IP/OBS HIGH 50: CPT

## 2024-04-17 RX ORDER — ALPRAZOLAM 0.25 MG
0.12 TABLET ORAL
Refills: 0 | Status: DISCONTINUED | OUTPATIENT
Start: 2024-04-17 | End: 2024-04-23

## 2024-04-17 RX ORDER — ENOXAPARIN SODIUM 100 MG/ML
40 INJECTION SUBCUTANEOUS EVERY 24 HOURS
Refills: 0 | Status: DISCONTINUED | OUTPATIENT
Start: 2024-04-17 | End: 2024-04-23

## 2024-04-17 RX ORDER — CHLORHEXIDINE GLUCONATE 213 G/1000ML
1 SOLUTION TOPICAL DAILY
Refills: 0 | Status: DISCONTINUED | OUTPATIENT
Start: 2024-04-17 | End: 2024-04-23

## 2024-04-17 RX ORDER — FERROUS SULFATE 325(65) MG
325 TABLET ORAL DAILY
Refills: 0 | Status: DISCONTINUED | OUTPATIENT
Start: 2024-04-17 | End: 2024-04-23

## 2024-04-17 RX ADMIN — Medication 0.12 MILLIGRAM(S): at 12:37

## 2024-04-17 RX ADMIN — Medication 1 TABLET(S): at 22:02

## 2024-04-17 RX ADMIN — Medication 100 MILLIGRAM(S): at 05:35

## 2024-04-17 RX ADMIN — Medication 1 SPRAY(S): at 04:33

## 2024-04-17 RX ADMIN — Medication 6 MILLIGRAM(S): at 22:02

## 2024-04-17 RX ADMIN — Medication 3 MILLILITER(S): at 21:56

## 2024-04-17 RX ADMIN — ENOXAPARIN SODIUM 40 MILLIGRAM(S): 100 INJECTION SUBCUTANEOUS at 21:10

## 2024-04-17 RX ADMIN — CHLORHEXIDINE GLUCONATE 1 APPLICATION(S): 213 SOLUTION TOPICAL at 13:05

## 2024-04-17 RX ADMIN — Medication 3 MILLILITER(S): at 09:35

## 2024-04-17 RX ADMIN — Medication 325 MILLIGRAM(S): at 22:02

## 2024-04-17 RX ADMIN — Medication 3 MILLILITER(S): at 14:26

## 2024-04-17 RX ADMIN — Medication 100 MILLIGRAM(S): at 21:10

## 2024-04-17 NOTE — CONSULT NOTE ADULT - NS ATTEND AMEND GEN_ALL_CORE FT
Patient seen and examined agree with above note as modified, where appropriate, by me. Metastatic small cell lung cancer with bulky neck and mediastinal disease. Will need chemotherapy. No roll for surgical intervention at this time. IP to evaluate for possible stents.
54yo M with newly diagnosed extensive stage SCLC presenting with worsening SOB initially planned for debulking and stenting. Given the highly chemosensitive nature of this tumor, we would recommend inpatient chemotherapy followed by stenting if respiratory status not improved. We would not recommend upfront RT as chemotherapy is more effective and would result in quicker relief of symptoms.   -Inpatient chemo  -Interventional pulm for stent as needed if respiratory status not improved
patient with ES-SCLC, declined chemo/immunotherapy in the past at [facility] [place]  Now with worsening SOB and respiratory distress,  CT chest reviewed, noted for right paramediastinal mass causing obstruction of the right upper lobe bronchus and significant narrowing of right main stem bronchus.  Discussed with patient need for urgent chemotherapy given the above,  Discussed chemo regimen including cisplatin/etoposide/atezolizumab, discussed risks and benefits, he is more open to consider it but wants to think about this further,  Would consult radOnc in the meantime and pulmonary for further input as patient may not proceed with chemo and he has acute respiratory distress,  Will follow

## 2024-04-17 NOTE — PHYSICAL THERAPY INITIAL EVALUATION ADULT - GENERAL OBSERVATIONS, REHAB EVAL
Chart reviewed and cleared for PT by MARYBETH Bell. Pt received sitting in bedside chair in NAD, all lines intact + telemetry, nasal canula, .

## 2024-04-17 NOTE — PROGRESS NOTE ADULT - SUBJECTIVE AND OBJECTIVE BOX
Patient is a 53y old  Male who presents with a chief complaint of SOB (17 Apr 2024 12:04)    Pt seen this afternoon, spouse at bedside. Still with dyspnea intermittently. Again requesting second opinion for his cancer treatment.    MEDICATIONS  (STANDING):  albuterol/ipratropium for Nebulization 3 milliLiter(s) Nebulizer every 6 hours  benzonatate 100 milliGRAM(s) Oral every 8 hours  chlorhexidine 2% Cloths 1 Application(s) Topical daily  influenza   Vaccine 0.5 milliLiter(s) IntraMuscular once  melatonin 6 milliGRAM(s) Oral at bedtime  polyethylene glycol 3350 17 Gram(s) Oral daily    MEDICATIONS  (PRN):  acetaminophen     Tablet .. 650 milliGRAM(s) Oral every 6 hours PRN Temp greater or equal to 38C (100.4F), Mild Pain (1 - 3)  ALPRAZolam 0.125 milliGRAM(s) Oral two times a day PRN anxiety  aluminum hydroxide/magnesium hydroxide/simethicone Suspension 30 milliLiter(s) Oral every 4 hours PRN Dyspepsia  guaiFENesin Oral Liquid (Sugar-Free) 100 milliGRAM(s) Oral every 6 hours PRN Cough  ondansetron Injectable 4 milliGRAM(s) IV Push every 8 hours PRN Nausea and/or Vomiting  oxycodone    5 mG/acetaminophen 325 mG 1 Tablet(s) Oral every 4 hours PRN Moderate Pain to Severe Pain (4-10)        Vital Signs Last 24 Hrs  T(C): 36.8 (17 Apr 2024 11:25), Max: 37 (17 Apr 2024 04:35)  T(F): 98.3 (17 Apr 2024 11:25), Max: 98.6 (17 Apr 2024 04:35)  HR: 89 (17 Apr 2024 11:25) (89 - 99)  BP: 102/68 (17 Apr 2024 11:25) (93/64 - 117/77)  BP(mean): --  RR: 19 (17 Apr 2024 11:25) (18 - 19)  SpO2: 95% (17 Apr 2024 11:25) (82% - 99%)    Parameters below as of 17 Apr 2024 11:25  Patient On (Oxygen Delivery Method): nasal cannula  O2 Flow (L/min): 6      PE  NAD  Awake, alert  Anicteric, MMM  No c/c/e  No rash grossly  FROM                          11.8   6.97  )-----------( 273      ( 17 Apr 2024 06:51 )             35.8       04-17    138  |  103  |  9   ----------------------------<  103<H>  4.2   |  22  |  0.78    Ca    9.2      17 Apr 2024 06:51  Phos  3.8     04-17  Mg     1.90     04-17

## 2024-04-17 NOTE — SWALLOW VFSS/MBS ASSESSMENT ADULT - RECOMMENDED CONSISTENCY
3. Functional Pharyngeal Stage for Soft/Bite-Sized and Regular Solids as characterized by adequate initiation of pharyngeal swallow trigger, adequate laryngeal elevation, adequate laryngeal vestibule closure, adequate epiglottic deflection, adequate base of tongue retraction, and adequate pharyngeal contractility. There is No Penetration/Aspiration before, during, or after the swallow for Soft/Bite-Sized and Regular Solids.    1. Regular Solids/ SMALL Sips Thin Liquids  2. Feeding and Swallowing Guidelines: Small sips; slow rate of intake; upright with PO and at least 30 minutes following  3. Aspiration Precautions  4. Maintain Adequate Oral Hygiene  5. Reflux Precautions 3. Functional Pharyngeal Stage for Soft/Bite-Sized and Regular Solids as characterized by adequate initiation of pharyngeal swallow trigger, adequate laryngeal elevation, adequate laryngeal vestibule closure, adequate epiglottic deflection, adequate base of tongue retraction, and adequate pharyngeal contractility. There is No Penetration/Aspiration before, during, or after the swallow for Soft/Bite-Sized and Regular Solids.  Of note: Given patient's report of difficulty with pills/tablets, the patient was given a barium tablet with a cup of water. The barium tablet was observed to cross through the pharynx without hold up.    1. Regular Solids/ SMALL Sips Thin Liquids  2. Feeding and Swallowing Guidelines: Small sips; slow rate of intake; upright with PO and at least 30 minutes following  3. Aspiration Precautions  4. Maintain Adequate Oral Hygiene  5. Reflux Precautions 3. Functional Pharyngeal Stage for Soft/Bite-Sized and Regular Solids as characterized by adequate initiation of pharyngeal swallow trigger, adequate laryngeal elevation, adequate laryngeal vestibule closure, adequate epiglottic deflection, adequate base of tongue retraction, and adequate pharyngeal contractility. There is No Penetration/Aspiration before, during, or after the swallow for Soft/Bite-Sized and Regular Solids.  Of note: Given patient's report of difficulty with pills/tablets, the patient was given a barium tablet with a cup of water. The barium tablet was observed to cross through the pharynx without hold up (lateral view, only). Esophageal screen was not able to be performed, as patient could not stand independently.     1. Regular Solids/ Thin Liquids  2. Feeding and Swallowing Guidelines: SMALL sips; slow rate of intake; upright with PO and at least 30 minutes following  3. Aspiration Precautions  4. Maintain Adequate Oral Hygiene  5. Reflux Precautions

## 2024-04-17 NOTE — PHYSICAL THERAPY INITIAL EVALUATION ADULT - ADDITIONAL COMMENTS
Pt lives with his wife in a house with no steps to enter, 2 flights to the bedroom. Pt did not use an assistive device and was independent with ADLs prior. Pt reports no falls in the past 6 months.  Pt left sitting in bedside chair in NAD, all lines intact, call [facility] in reach and MARYBETH Bell made aware.

## 2024-04-17 NOTE — SWALLOW VFSS/MBS ASSESSMENT ADULT - ORAL PHASE COMMENTS
Premature spillage to hypopharynx (Vallecular/Pyriforms).  Laryngeal Penetration BEFORE the swallow without trace retrieval for Large Sips Thin Liquids, leaving trace residue in the laryngeal vestibule.  Trace-mild posterior lingual surface residue; Spontaneous re-swallow to clear. Piecemeal transfer/deglutition. Piecemeal transfer/deglutition Mild posterior lingual stasis; Spontaneous re-swallow to clear.

## 2024-04-17 NOTE — PROGRESS NOTE ADULT - NS ATTEND AMEND GEN_ALL_CORE FT
plan for tumor debulking tomorrow. patient expresses that he would like opinion from [facility] oncology and possible treatment with [facility].  Discussed chemo options mainly with carbo and etoposide.   - will await procedure tomorrow for debukling   -

## 2024-04-17 NOTE — CONSULT NOTE ADULT - ATTENDING COMMENTS
Agree with above. Patient with extensive stage SCLC, with malignant central airway obstruction, with narrowing of BI. IP was consulted for stent placement/tumor debulking. While originally planned for OR tomorrow, after discussion with oncology and based on patient preference, they prefer to wait and treat inpatient. Given rapid response of small cell with systemic therapy, ok to monitor airway from IP standpoint if inhouse chemo being pursued. If no response, or any progressive airway symptoms noted, will pursue airway intervention. Would recommend chemotherapy at this time. If radiation being pursued upfront, would prefer airway stenting prior to radiation in the absence of systemic chemo. Or cancelled for tomorrow based on above, IP team to follow closely. Please inform IP team immediately if any worsening respiratory symptoms/status noted or if there is any delay in systemic therapy in which case we would prefer to proceed with airway intervention.     IP team to follow. Discussed with house onc at bedside, patient as well as thoracic surgery

## 2024-04-17 NOTE — DIETITIAN INITIAL EVALUATION ADULT - NS FNS DIET ORDER
Regular: Supplement Feeding Modality:  Oral Ensure Plus High Protein Cans or Servings Per Day:  1       Frequency:  Two Times a day (04-17-24 @ 10:36)

## 2024-04-17 NOTE — SWALLOW VFSS/MBS ASSESSMENT ADULT - COMMENTS
Hospitalist Note 4/16, "54 yo M smoker with Pmhx of newly diagnosed small cell lung cancer presents to the ED with worsening SOB, most likely due to lung cancer causing obstructive pathology... Seen by me this morning, wife Angella at bedside. Sitting in chair, c/o SOB, cough productive of white sputum, difficulty swallowing d/t mass on R side of neck. +Weight loss (intentional as he has been fasting since cancer dx). +R shoulder pain. Insomnia.”    CTA 4/15: “No pulmonary embolism.5.3 x 5.1 cm right paramediastinal mass with extension into mediastinum. Increased soft tissue density in the mediastinum and sreekanth, causing obstruction of the right upper lobe bronchus and pulmonary artery and significant narrowing of right main stem bronchus and left inferior pulmonary vein. Axillary lymphadenopathy, likely metastatic.”    Chart Note-4/16: “alerted by RN that pt desatted to 87% on 4L nasal cannula. evaluated pt at bedside. pt denies chest pain, shortness of breath, headache, fever, chills, abdominal pain. pt requesting to stay on 6L nasal cannula at this time. pt SpO2 94% on 6L nasal cannula.”    Of note: Patient seen by this service for Clinical Swallow Evaluation 4/16/24 with recommendation for: “From an oral pharyngeal standpoint: Regular Solids with Thin Liquids; VFSS/MBS; Consider a cinesophagram at MD's discretion given patient with baseline cough to determine if cough is dysphagia/ aspiration related.” During bedside swallow assessment, patient endorsed difficulty swallowing pills (see note for details).    Patient received in Radiology Suite this AM for a Cinesophagram. Patient transferred to specialized seating unit with lateral projection. Patient able to make basic wants/needs known and follow simple directives. Patient receiving O2 via NC. Reported difficulties swallowing his secretions this AM, although noted improvement since.
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
## 2024-04-17 NOTE — PROGRESS NOTE ADULT - CONVERSATION DETAILS
GOC held with patient and wife in the setting of increased O2 requirements and underlying small cell lung cancer. Pt opts for intubation/resuscitation if his condition is reversible/treatable but otherwise would not want to be intubated. Wife Angella will assist with decision making if he is not capable of doing so. At this time patient is to be intubated if the need arises. Full Code for now.
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
## 2024-04-17 NOTE — SWALLOW VFSS/MBS ASSESSMENT ADULT - ROSENBEK'S PENETRATION ASPIRATION SCALE
(1) no aspiration, contrast does not enter airway (3) contrast remains above the vocal cords, visible residue remains (penetration) Laryngeal Penetration with Small Sip Mildly-Thick with retrieval./(2) contrast enters airway, remains above the vocal cords, no residue remains (penetration)

## 2024-04-17 NOTE — SWALLOW VFSS/MBS ASSESSMENT ADULT - ADDITIONAL RECOMMENDATIONS
This department will follow-up as schedule permits to assess for diet tolerance/swallow therapy. Patient will benefit from swallow therapy pending discharge planning (e.g., Homecare vs. Rehab vs. [facility] [place] [phone]). Medical team advised to reconsult if there is change in medical status and/or observed change in patient’s tolerance of recommended PO diet.

## 2024-04-17 NOTE — PHYSICAL THERAPY INITIAL EVALUATION ADULT - PERTINENT HX OF CURRENT PROBLEM, REHAB EVAL
Pt is a 53 year old Male smoker with a past medical history of newly diagnosed small cell lung cancer presents to the ED with worsening SOB, most likely due to lung cancer causing obstructive pathology.

## 2024-04-17 NOTE — CHART NOTE - NSCHARTNOTEFT_GEN_A_CORE
Vital Signs Last 24 Hrs  T(C): 37 (17 Apr 2024 04:35), Max: 37 (17 Apr 2024 04:35)  T(F): 98.6 (17 Apr 2024 04:35), Max: 98.6 (17 Apr 2024 04:35)  HR: 91 (17 Apr 2024 04:35) (89 - 103)  BP: 93/64 (17 Apr 2024 04:35) (93/64 - 117/77)  BP(mean): --  RR: 18 (17 Apr 2024 04:35) (18 - 19)  SpO2: 96% (17 Apr 2024 04:35) (82% - 99%)    Parameters below as of 17 Apr 2024 04:35  Patient On (Oxygen Delivery Method): nasal cannula  O2 Flow (L/min): 6    Patient still c/o difficulty swallowing this morning as per nurse, will place on NPO until CINE performed.  Patient now requiring 6L NC, House Pulmonary consult called for further evaluation and recommendations Vital Signs Last 24 Hrs  T(C): 37 (17 Apr 2024 04:35), Max: 37 (17 Apr 2024 04:35)  T(F): 98.6 (17 Apr 2024 04:35), Max: 98.6 (17 Apr 2024 04:35)  HR: 91 (17 Apr 2024 04:35) (89 - 103)  BP: 93/64 (17 Apr 2024 04:35) (93/64 - 117/77)  BP(mean): --  RR: 18 (17 Apr 2024 04:35) (18 - 19)  SpO2: 96% (17 Apr 2024 04:35) (82% - 99%)    Parameters below as of 17 Apr 2024 04:35  Patient On (Oxygen Delivery Method): nasal cannula  O2 Flow (L/min): 6    Patient still c/o difficulty swallowing this morning as per nurse, will place on NPO until CINE performed.  Patient now requiring 6L NC, House Pulmonary consult called for further evaluation and recommendations. house oncology called by attending for second opinion as requested by patient.      ADDENDUM:  Received call from Pulm that bronchoscopy w/ stent cancelled for tomorrow as per their discussion with Oncoloyg, now plan for starting inpatient chemo with [facility].  As per Dr. Sinha discussion with house oncology they will coordinate with [facility] for starting chemo

## 2024-04-17 NOTE — DIETITIAN INITIAL EVALUATION ADULT - OTHER INFO
Pt 54 yo male with newly diagnosed small cell lung cancer [place] [facility] [place] planned to start chemo/immunotherapy presented with worsening SOB, now seeking a second opinion while inpatient - per chart review. Of note, possible flexible bronch with stent placement on tomorrow 4/18.     Pt awake, alert, oriented. Pt NPO, at time of visit. Per Pt, his appetite usually good with no chewing or swallowing difficulty with food/food items. But Pt C/O swallowing difficulty with pills/tablets. Of note, s/p S&S eval: regular diet; s/p CINE --> no aspiration, c/w regular diet. No report of vomiting or diarrhea @ this time. +Last BM (4/16) per flow sheet.    Per Pt, his height: 74" & his body weight: 194#, PTA. Pt with weight loss: ~40#/1 month -> intentional 2/2 fasting, reported. Food preferences discussed. PTA, Pt was avoiding Regular sugar and was watching his starch intake. RD answered Pt's concerns related to diet. RD remains available, Pt made aware.    Pt 54 yo male with newly diagnosed small cell lung cancer (<1month at [facility] [place] plan to start chemo/immunotherapy presented with worsening SOB, now seeking a second opinion while inpatient - per chart review. Of note, possible flexible bronch with stent placement on tomorrow 4/18.     Pt awake, alert, oriented. Pt NPO, at time of visit. Per Pt, his appetite usually good with no chewing or swallowing difficulty with food/food items. But Pt C/O swallowing difficulty with pills/tablets. Of note, s/p S&S eval: regular diet; s/p CINE --> no aspiration, c/w regular diet. No report of vomiting or diarrhea @ this time. +Last BM (4/16) per flow sheet.    Per Pt, his height: 74" & his body weight: 194#, PTA. Pt with weight loss: ~40#/1 month -> intentional 2/2 fasting, reported. Food preferences discussed. PTA, Pt was avoiding Regular sugar and was watching his starch intake. RD answered Pt's concerns related to diet. RD remains available, Pt made aware.

## 2024-04-17 NOTE — CHART NOTE - NSCHARTNOTEFT_GEN_A_CORE
Called back as patient requesting second opinion from in-house oncology.  Reviewed pathology reports on patient's wife phone, which was consistent with small cell lung cancer. However, given that records and pathology are still currently not available for review physically in-house, we are unable to treat patient with systemic chemotherapy at this time. Reviewed that the recommended treatment regimen (Carbo/Etop/Atezo) is standard with extensive-stage SCLC and the same treatment plan the primary oncology team is offering. Stated that we would recommend the same treatment. Given that patient remains very symptomatic, advised that he begin treatment with his primary oncology team as to not delay his care. After this discussion, patient and patient's wife agreeable to begin treatment with primary oncology team.    We reached out to Dr. Harris to discuss and he is in agreement with plan and will start tomorrow.    Case discussed with Dr. Dias.    Elissa Jesus DO, MPH  Medical Oncology Fellow, PGY-8  *Can be reached via Teams, but please contact the on-call fellow after 5pm-8am on weekdays and on weekends.

## 2024-04-17 NOTE — CHART NOTE - NSCHARTNOTEFT_GEN_A_CORE
Per Dr. Bruno, deferring future interventions to  Interventional Pulmonology.   No Thoracic interventions indicated.   Will sign off.   Recall with questions or concerns.

## 2024-04-17 NOTE — SWALLOW VFSS/MBS ASSESSMENT ADULT - DIAGNOSTIC IMPRESSIONS
1. Mild Oral Stage for Thin Liquids, Mildly-Thick Liquids, Puree, Soft/Bite-Sized Solids, and Regular Solids as characterized by adequate oral containment, adequate bolus manipulation, adequate mastication for Soft/Bite-Sized and Regular Solids, slow anterior-posterior transfer with piecemeal transfer/deglutition (2 swallows) for Puree and Soft/Bite-Sized Solids, premature spillage to hypopharynx (Vallecular/Pyriforms) due to reduced [id number] seal for Thin Liquids, reduced oral clearance with trace-mild posterior lingual surface stasis post primary-swallow for Thin Liquids, Mildly-Thick Liquids, and Puree. Intermittent migration of oral stasis to the hypopharynx (Vallecular/Pyriforms). Spontaneous re-swallow completed to clear oral and pharyngeal residue.  2. Mild Pharyngeal Stage for Thin Liquids, Mildly-Thick Liquids, and Puree as characterized by delay in initiation of pharyngeal swallow trigger (Bolus Head at the Pyriforms for Large Sips Thin Liquids), adequate laryngeal elevation, adequate laryngeal vestibule closure, adequate epiglottic deflection, reduced base of tongue retraction, and reduced pharyngeal contractility with trace-mild residue (Vallecular/Pyriforms). Pharyngeal residue cleared to trace with spontaneous re-swallow. There is Laryngeal Penetration BEFORE the swallow without trace retrieval for Large Sips Thin Liquids, leaving trace residue in the laryngeal vestibule. There is Laryngeal Penetration DURING the swallow with retrieval for Small Sips Thin Liquids and Mildly-Thick Liquids with airway protection maintained. There is No Aspiration before, during, or after the swallow for Thin Liquids, Mildly-Thick Liquids, and Puree (continued below). 1. Mild Oral Stage for Thin Liquids, Mildly-Thick Liquids, Puree, Soft/Bite-Sized Solids, and Regular Solids as characterized by adequate oral containment, adequate bolus manipulation, adequate mastication for Soft/Bite-Sized and Regular Solids, slow anterior-posterior transfer with piecemeal transfer/deglutition (2 swallows) for Puree, Soft/Bite-Sized, and Regular Solids, premature spillage to hypopharynx (Vallecular/Pyriforms) due to reduced [id number] seal for Thin Liquids, reduced oral clearance with trace-mild posterior lingual surface stasis post primary-swallow for Thin Liquids, Mildly-Thick Liquids, and Puree. Intermittent migration of oral stasis (trace) to the hypopharynx (Vallecular/Pyriforms) with liquids, only. Spontaneous re-swallow completed to clear oral and pharyngeal residue.  2. Mild Pharyngeal Stage for Thin Liquids, Mildly-Thick Liquids, and Puree as characterized by delay in initiation of pharyngeal swallow trigger (Bolus Head at the Pyriforms for Large Sips Thin Liquids), adequate laryngeal elevation, adequate laryngeal vestibule closure, adequate epiglottic deflection, reduced base of tongue retraction, and reduced pharyngeal contractility with trace-mild pharyngeal residue (Vallecular/Pyriforms). Spontaneous re-swallow improves pharyngeal clearance. There is Laryngeal Penetration BEFORE the swallow without retrieval for Large Sips Thin Liquids, leaving trace residue in the laryngeal vestibule above the level of the vocal folds. There is Laryngeal Penetration DURING the swallow with retrieval for Small Sips Thin Liquids and Mildly-Thick Liquids with airway protection maintained. There is No Aspiration before, during, or after the swallow for Thin Liquids, Mildly-Thick Liquids, and Puree (continued below).

## 2024-04-17 NOTE — SWALLOW VFSS/MBS ASSESSMENT ADULT - DEMONSTRATES NEED FOR REFERRAL TO ANOTHER SERVICE
Recommend GI consult to rule-out esophageal dysphagia component given patient report of difficulty with pills/tablets and stuck sensation in throat to mid-chest./GI Recommend GI consult to rule-out esophageal dysphagia component given patient report of difficulty with pills/tablets and stuck sensation in throat to mid-chest (see above of note)./GI

## 2024-04-17 NOTE — PROGRESS NOTE ADULT - SUBJECTIVE AND OBJECTIVE BOX
Patient is a 53y old  Male who presents with a chief complaint of SOB (17 Apr 2024 14:28)      INTERVAL HPI/OVERNIGHT EVENTS:  O/n events noted, SOB and pt was placed on 6L NC.  Seen and examined by me this morning, wife at bedside, feeling SOB but better at time of my visit, will desaturate to high 80's with movement/eating per RN but will go back up to 90's soon after he stops moving/eating. No chest pain. R shoulder pain is better controlled with percocet. Melatonin 6mg worked better for him last night. +Anxiety. +Cough is still present. Wants to speak with house-Oncology. Had some issues swallowing earlier but it got better by the time I went to see him. Tolerating PO, good appetite.    Review of Systems: 12 point review of systems otherwise negative    MEDICATIONS  (STANDING):  albuterol/ipratropium for Nebulization 3 milliLiter(s) Nebulizer every 6 hours  benzonatate 100 milliGRAM(s) Oral every 8 hours  chlorhexidine 2% Cloths 1 Application(s) Topical daily  enoxaparin Injectable 40 milliGRAM(s) SubCutaneous every 24 hours  influenza   Vaccine 0.5 milliLiter(s) IntraMuscular once  melatonin 6 milliGRAM(s) Oral at bedtime  polyethylene glycol 3350 17 Gram(s) Oral daily    MEDICATIONS  (PRN):  acetaminophen     Tablet .. 650 milliGRAM(s) Oral every 6 hours PRN Temp greater or equal to 38C (100.4F), Mild Pain (1 - 3)  ALPRAZolam 0.125 milliGRAM(s) Oral two times a day PRN anxiety  aluminum hydroxide/magnesium hydroxide/simethicone Suspension 30 milliLiter(s) Oral every 4 hours PRN Dyspepsia  guaiFENesin Oral Liquid (Sugar-Free) 100 milliGRAM(s) Oral every 6 hours PRN Cough  ondansetron Injectable 4 milliGRAM(s) IV Push every 8 hours PRN Nausea and/or Vomiting  oxycodone    5 mG/acetaminophen 325 mG 1 Tablet(s) Oral every 4 hours PRN Moderate Pain to Severe Pain (4-10)      Allergies    No Known Allergies    Intolerances          Vital Signs Last 24 Hrs  T(C): 36.7 (17 Apr 2024 15:25), Max: 37 (17 Apr 2024 04:35)  T(F): 98.1 (17 Apr 2024 15:25), Max: 98.6 (17 Apr 2024 04:35)  HR: 75 (17 Apr 2024 15:48) (75 - 91)  BP: 98/62 (17 Apr 2024 15:25) (93/64 - 102/72)  BP(mean): --  RR: 18 (17 Apr 2024 15:25) (18 - 19)  SpO2: 96% (17 Apr 2024 15:48) (95% - 99%)    Parameters below as of 17 Apr 2024 15:48  Patient On (Oxygen Delivery Method): nasal cannula      CAPILLARY BLOOD GLUCOSE          04-16 @ 07:01  -  04-17 @ 07:00  --------------------------------------------------------  IN: 670 mL / OUT: 0 mL / NET: 670 mL        Physical Exam:    Daily     Daily   General:  Well appearing, NAD, not cachetic, on 6L NC  HEENT:  Nonicteric, PERRLA,  R supraclavicular mass, no LAD palpated  CV:  RRR, no murmur, no JVD  Lungs:  Decreased BS on R base, o/w CTA  Abdomen:  Soft, non-tender, no distended, positive BS, no hepatosplenomegaly  Extremities:  2+ pulses, no c/c, no edema  Skin:  Warm and dry, no rashes  :  No ellis  Neuro:  AAOx3, non-focal, CN II-XII grossly intact  No Restraints    LABS:                        11.8   6.97  )-----------( 273      ( 17 Apr 2024 06:51 )             35.8     04-17    138  |  103  |  9   ----------------------------<  103<H>  4.2   |  22  |  0.78    Ca    9.2      17 Apr 2024 06:51  Phos  3.8     04-17  Mg     1.90     04-17        Urinalysis Basic - ( 17 Apr 2024 06:51 )    Color: x / Appearance: x / SG: x / pH: x  Gluc: 103 mg/dL / Ketone: x  / Bili: x / Urobili: x   Blood: x / Protein: x / Nitrite: x   Leuk Esterase: x / RBC: x / WBC x   Sq Epi: x / Non Sq Epi: x / Bacteria: x          RADIOLOGY & ADDITIONAL TESTS:  Reviewed by me

## 2024-04-17 NOTE — PROGRESS NOTE ADULT - ASSESSMENT
This is a 53 year old male with extensive-stage small cell lung cancer who presents with worsening shortness of breath.    1. ES-SCLC   -- Diagnosed via biopsy of L neck mass on 03/2024  -- PET/CT showed enlarged cervical and thoracic LAD, minimally FDG-avid RUL nodule  -- Outpatient he had declined chemoimmunotherapy and wanted to pursue homeopathic treatments, however now considering systemic therapy   -- CTA chest shows 5.3 x 5.1 cm R paramediastinal mass, increased soft tissue density in mediastinum and sreekanth causing obstruction of RUL bronchus and significant narrowing of right mainstem bronchus   -- Discussed potential plan for inpatient treatment with carboplatin + etoposide + atezolizumab, pt states he will think about it- again now requesting second opinion before proceeding   -- After discharge, follow up with Dr. Bowen Avalos [place] [facility] [place].    2. Shortness of breath   -- CTA as above   -- Thoracic consulted, rec IR eval  -- IR consulted, plan for OR tomorrow for bronchoscopy, tumor debulking, stent placement  -- Rad onc consulted, no plan for RT  -- May plan for inpatient treatment as above if pt agreeable but requesting second opinion from [facility] oncology        Mary Singh PA-C  Hematology/Oncology  [facility]  [phone] (office)  [phone] (alt office)  Evenings and weekends please call MD on call or office

## 2024-04-17 NOTE — CONSULT NOTE ADULT - ASSESSMENT
54 yo M current smoker with PMH small cell lung cancaer (newly diagnosed) who presents with 3-4 days of shortness of breath and LANDRUM. CT chest showed 5.3 x 5.1 cm right paramediastinal mass with extension into mediastinum and RUL obstruction. Interventional pulmonology consulted for possible airway stent.     # R paramediastinal mass  # RUL bronchus compression  Patient with recent diagnosis of SCLC at [facility] [place]. Has not yet started treatment. Presenting with progressive shortness of breath and found to have paramediastinal mass with RUL airway compression.  - tentative plan for OR tomorrow for rigid bronchoscopy, tumor debulking, stent placement  - NPO midnight tonight  - hold DVT ppx  - early AM preop labs: cbc, cmp, type and screen, coags      Sondra Perez MD  Pulmonary and Critical Care Fellow 54 yo M current smoker with PMH small cell lung cancaer (newly diagnosed) who presents with 3-4 days of shortness of breath and LANDRUM. CT chest showed 5.3 x 5.1 cm right paramediastinal mass with extension into mediastinum and RUL obstruction. Interventional pulmonology consulted for possible airway stent.     # R paramediastinal mass  # RUL bronchus compression  Patient with recent diagnosis of SCLC at [facility] [place]. Has not yet started treatment. Presenting with progressive shortness of breath and found to have paramediastinal mass with RUL airway compression.  - plan for OR tomorrow for rigid bronchoscopy, tumor debulking, stent placement  - NPO midnight tonight  - hold DVT ppx  - early AM preop labs: cbc, cmp, type and screen, coags  - appreciate heme/onc involvement       Sondra Perez MD  Pulmonary and Critical Care Fellow 54 yo M current smoker with PMH small cell lung cancaer (newly diagnosed) who presents with 3-4 days of shortness of breath and LANDRUM. CT chest showed 5.3 x 5.1 cm right paramediastinal mass with extension into mediastinum and RUL obstruction. Interventional pulmonology consulted for possible airway stent.     # R paramediastinal mass  # RUL bronchus compression  Patient with recent diagnosis of SCLC at [facility] [place]. Has not yet started treatment. Presenting with progressive shortness of breath and found to have paramediastinal mass with RUL airway compression.  - initially was planned for OR tomorrow for rigid bronchoscopy, tumor debulking, stent placement -- however, discussed with oncology and patient. He is planned to start treatment. Will wait to assess treatment response prior to proceeding with stent placement.   - appreciate heme/onc involvement       Sondra Perez MD  Pulmonary and Critical Care Fellow 52 yo M current smoker with PMH small cell lung cancaer (newly diagnosed) who presents with 3-4 days of shortness of breath and LANDRUM. CT chest showed 5.3 x 5.1 cm right paramediastinal mass with extension into mediastinum and RUL obstruction. Interventional pulmonology consulted for possible airway stent.     # R paramediastinal mass  # RUL bronchus compression  Patient with recent diagnosis of SCLC at [facility] [place]. Has not yet started treatment. Presenting with progressive shortness of breath and found to have paramediastinal mass with RUL airway compression.  - initially was planned for OR tomorrow for rigid bronchoscopy, tumor debulking, stent placement -- however, discussed with oncology and patient. He is planned to start treatment and patient wants to wait for treatment response. If treatment is being planned inpatient, we can monitor clinically and pursue stent placement if limited or no response noted. Please note that if radiation pursued, and airway continues to be narrow, recommend stent placement prior to RT. Ok to proceed with chemotherapy. Will wait to assess treatment response prior to proceeding with stent placement.   - appreciate heme/onc involvement       Sondra Perez MD  Pulmonary and Critical Care Fellow

## 2024-04-18 LAB
ANION GAP SERPL CALC-SCNC: 12 MMOL/L — SIGNIFICANT CHANGE UP (ref 7–14)
APTT BLD: 33.9 SEC — SIGNIFICANT CHANGE UP (ref 24.5–35.6)
BLD GP AB SCN SERPL QL: NEGATIVE — SIGNIFICANT CHANGE UP
BUN SERPL-MCNC: 9 MG/DL — SIGNIFICANT CHANGE UP (ref 7–23)
CALCIUM SERPL-MCNC: 9.1 MG/DL — SIGNIFICANT CHANGE UP (ref 8.4–10.5)
CHLORIDE SERPL-SCNC: 101 MMOL/L — SIGNIFICANT CHANGE UP (ref 98–107)
CO2 SERPL-SCNC: 23 MMOL/L — SIGNIFICANT CHANGE UP (ref 22–31)
CREAT SERPL-MCNC: 0.82 MG/DL — SIGNIFICANT CHANGE UP (ref 0.5–1.3)
EGFR: 106 ML/MIN/1.73M2 — SIGNIFICANT CHANGE UP
GLUCOSE SERPL-MCNC: 104 MG/DL — HIGH (ref 70–99)
HCT VFR BLD CALC: 35.6 % — LOW (ref 39–50)
HGB BLD-MCNC: 11.8 G/DL — LOW (ref 13–17)
INR BLD: 0.99 RATIO — SIGNIFICANT CHANGE UP (ref 0.85–1.18)
MAGNESIUM SERPL-MCNC: 1.9 MG/DL — SIGNIFICANT CHANGE UP (ref 1.6–2.6)
MCHC RBC-ENTMCNC: 29.9 PG — SIGNIFICANT CHANGE UP (ref 27–34)
MCHC RBC-ENTMCNC: 33.1 GM/DL — SIGNIFICANT CHANGE UP (ref 32–36)
MCV RBC AUTO: 90.1 FL — SIGNIFICANT CHANGE UP (ref 80–100)
NRBC # BLD: 0 /100 WBCS — SIGNIFICANT CHANGE UP (ref 0–0)
NRBC # FLD: 0 K/UL — SIGNIFICANT CHANGE UP (ref 0–0)
PHOSPHATE SERPL-MCNC: 4 MG/DL — SIGNIFICANT CHANGE UP (ref 2.5–4.5)
PLATELET # BLD AUTO: 282 K/UL — SIGNIFICANT CHANGE UP (ref 150–400)
POTASSIUM SERPL-MCNC: 4.1 MMOL/L — SIGNIFICANT CHANGE UP (ref 3.5–5.3)
POTASSIUM SERPL-SCNC: 4.1 MMOL/L — SIGNIFICANT CHANGE UP (ref 3.5–5.3)
PROTHROM AB SERPL-ACNC: 11.2 SEC — SIGNIFICANT CHANGE UP (ref 9.5–13)
RBC # BLD: 3.95 M/UL — LOW (ref 4.2–5.8)
RBC # FLD: 14.1 % — SIGNIFICANT CHANGE UP (ref 10.3–14.5)
RH IG SCN BLD-IMP: POSITIVE — SIGNIFICANT CHANGE UP
SODIUM SERPL-SCNC: 136 MMOL/L — SIGNIFICANT CHANGE UP (ref 135–145)
URATE SERPL-MCNC: 5 MG/DL — SIGNIFICANT CHANGE UP (ref 3.4–8.8)
WBC # BLD: 6.26 K/UL — SIGNIFICANT CHANGE UP (ref 3.8–10.5)
WBC # FLD AUTO: 6.26 K/UL — SIGNIFICANT CHANGE UP (ref 3.8–10.5)

## 2024-04-18 PROCEDURE — 99233 SBSQ HOSP IP/OBS HIGH 50: CPT

## 2024-04-18 PROCEDURE — 99233 SBSQ HOSP IP/OBS HIGH 50: CPT | Mod: GC

## 2024-04-18 RX ORDER — ALLOPURINOL 300 MG
300 TABLET ORAL DAILY
Refills: 0 | Status: DISCONTINUED | OUTPATIENT
Start: 2024-04-18 | End: 2024-04-23

## 2024-04-18 RX ORDER — SODIUM CHLORIDE 9 MG/ML
1000 INJECTION INTRAMUSCULAR; INTRAVENOUS; SUBCUTANEOUS
Refills: 0 | Status: COMPLETED | OUTPATIENT
Start: 2024-04-19 | End: 2024-04-19

## 2024-04-18 RX ADMIN — Medication 100 MILLIGRAM(S): at 21:12

## 2024-04-18 RX ADMIN — Medication 100 MILLIGRAM(S): at 13:22

## 2024-04-18 RX ADMIN — Medication 325 MILLIGRAM(S): at 13:21

## 2024-04-18 RX ADMIN — Medication 3 MILLILITER(S): at 08:19

## 2024-04-18 RX ADMIN — ENOXAPARIN SODIUM 40 MILLIGRAM(S): 100 INJECTION SUBCUTANEOUS at 21:12

## 2024-04-18 RX ADMIN — Medication 1 TABLET(S): at 13:21

## 2024-04-18 RX ADMIN — Medication 3 MILLILITER(S): at 22:35

## 2024-04-18 RX ADMIN — Medication 100 MILLIGRAM(S): at 05:01

## 2024-04-18 RX ADMIN — POLYETHYLENE GLYCOL 3350 17 GRAM(S): 17 POWDER, FOR SOLUTION ORAL at 13:20

## 2024-04-18 RX ADMIN — CHLORHEXIDINE GLUCONATE 1 APPLICATION(S): 213 SOLUTION TOPICAL at 13:21

## 2024-04-18 RX ADMIN — Medication 6 MILLIGRAM(S): at 21:13

## 2024-04-18 RX ADMIN — Medication 3 MILLILITER(S): at 13:39

## 2024-04-18 NOTE — PROGRESS NOTE ADULT - ASSESSMENT
This is a 53 year old male with extensive-stage small cell lung cancer who presents with worsening shortness of breath.    1. ES-SCLC   -- Diagnosed via biopsy of L neck mass on 03/2024  -- PET/CT showed enlarged cervical and thoracic LAD, minimally FDG-avid RUL nodule  -- Outpatient he had declined chemoimmunotherapy and wanted to pursue homeopathic treatments, however now amenable to systemic therapy.  -- CTA chest shows 5.3 x 5.1 cm R paramediastinal mass, increased soft tissue density in mediastinum and sreekanth causing obstruction of RUL bronchus and significant narrowing of right mainstem bronchus   -- Will plan to treat inpatient with carboplatin D1 + etoposide D1-3 starting 04/18- orders written, consent obtained. Plan for immunotherapy as outpatient. Zarxio to follow starting day 4.  -- Rec IV hydration prior to chemotherapy, and check TLS labs daily. Pending G6PD and uric acid levels today.  -- After discharge, [place] [facility] [place]    2. Shortness of breath   -- CTA as above   -- Thoracic consulted, rec IR eval  -- IR consulted, bronchoscopy w stent placement cancelled as pt will receive chemotherapy  -- Rad onc consulted, no plan for RT  -- On HFNC, wean supplemental O2 as tolerated    Will continue to follow.    Mary Singh PA-C  Hematology/Oncology  [facility]  [phone] (office)  [phone] (alt office)  Evenings and weekends please call MD on call or office

## 2024-04-18 NOTE — PROGRESS NOTE ADULT - ASSESSMENT
52 yo M current smoker with PMH small cell lung cancaer (newly diagnosed) who presents with 3-4 days of shortness of breath and LANDRUM. CT chest showed 5.3 x 5.1 cm right paramediastinal mass with extension into mediastinum and RUL obstruction. Interventional pulmonology consulted for possible airway stent.     # R paramediastinal mass  # RUL bronchus compression  Patient with recent diagnosis of [facility]. Has not yet started treatment. Presenting with progressive shortness of breath and found to have paramediastinal mass with RUL airway compression.  - initially was planned for OR for rigid bronchoscopy, tumor debulking, stent placement -- however, discussed with oncology and patient. He is planned to start treatment and patient wants to wait for treatment response. If treatment is being planned inpatient, we can monitor clinically and pursue stent placement if limited or no response noted. Please note that if radiation pursued, and airway continues to be narrow, recommend stent placement prior to RT. Ok to proceed with chemotherapy. Will wait to assess treatment response prior to proceeding with stent placement.   - appreciate heme/onc involvement       Sondra Perez MD  Pulmonary and Critical Care Fellow

## 2024-04-18 NOTE — PROGRESS NOTE ADULT - SUBJECTIVE AND OBJECTIVE BOX
Interval Events:  - on HFNC  - planned to start chemotherapy   - no sob, cough, hemoptysis     REVIEW OF SYSTEMS:  Negative except as documented above.      OBJECTIVE:  ICU Vital Signs Last 24 Hrs  T(C): 36.9 (18 Apr 2024 13:00), Max: 36.9 (18 Apr 2024 13:00)  T(F): 98.5 (18 Apr 2024 13:00), Max: 98.5 (18 Apr 2024 13:00)  HR: 92 (18 Apr 2024 14:44) (75 - 102)  BP: 128/74 (18 Apr 2024 13:00) (97/66 - 128/74)  BP(mean): --  ABP: --  ABP(mean): --  RR: 19 (18 Apr 2024 13:00) (18 - 20)  SpO2: 91% (18 Apr 2024 14:44) (91% - 98%)    O2 Parameters below as of 18 Apr 2024 14:44  Patient On (Oxygen Delivery Method): nasal cannula, high flow              04-17 @ 07:01  -  04-18 @ 07:00  --------------------------------------------------------  IN: 200 mL / OUT: 0 mL / NET: 200 mL      CAPILLARY BLOOD GLUCOSE          PHYSICAL EXAM:  General: NAD  HEENT:  EOMI, sclera anicteric, moist mucus membranes  Neck: supple  Cardiovascular: RRR  Respiratory: CTAB, no wheezes, crackles, or rhonci  Abdomen: soft, nontender  Extremities: warm and well perfused, no edema, no clubbing  Skin: no rashes  Neurological: no focal deficits    HOSPITAL MEDICATIONS:  MEDICATIONS  (STANDING):  albuterol/ipratropium for Nebulization 3 milliLiter(s) Nebulizer every 6 hours  benzonatate 100 milliGRAM(s) Oral every 8 hours  chlorhexidine 2% Cloths 1 Application(s) Topical daily  enoxaparin Injectable 40 milliGRAM(s) SubCutaneous every 24 hours  ferrous    sulfate 325 milliGRAM(s) Oral daily  influenza   Vaccine 0.5 milliLiter(s) IntraMuscular once  melatonin 6 milliGRAM(s) Oral at bedtime  multivitamin 1 Tablet(s) Oral daily  polyethylene glycol 3350 17 Gram(s) Oral daily    MEDICATIONS  (PRN):  acetaminophen     Tablet .. 650 milliGRAM(s) Oral every 6 hours PRN Temp greater or equal to 38C (100.4F), Mild Pain (1 - 3)  ALPRAZolam 0.125 milliGRAM(s) Oral two times a day PRN anxiety  aluminum hydroxide/magnesium hydroxide/simethicone Suspension 30 milliLiter(s) Oral every 4 hours PRN Dyspepsia  guaiFENesin Oral Liquid (Sugar-Free) 100 milliGRAM(s) Oral every 6 hours PRN Cough  ondansetron Injectable 4 milliGRAM(s) IV Push every 8 hours PRN Nausea and/or Vomiting  oxycodone    5 mG/acetaminophen 325 mG 1 Tablet(s) Oral every 4 hours PRN Moderate Pain to Severe Pain (4-10)      LABS:                        11.8   6.26  )-----------( 282      ( 18 Apr 2024 06:30 )             35.6     Hgb Trend: 11.8<--, 11.8<--, 12.5<--  04-18    136  |  101  |  9   ----------------------------<  104<H>  4.1   |  23  |  0.82    Ca    9.1      18 Apr 2024 06:30  Phos  4.0     04-18  Mg     1.90     04-18      Creatinine Trend: 0.82<--, 0.78<--, 0.80<--  PT/INR - ( 18 Apr 2024 06:30 )   PT: 11.2 sec;   INR: 0.99 ratio         PTT - ( 18 Apr 2024 06:30 )  PTT:33.9 sec  Urinalysis Basic - ( 18 Apr 2024 06:30 )    Color: x / Appearance: x / SG: x / pH: x  Gluc: 104 mg/dL / Ketone: x  / Bili: x / Urobili: x   Blood: x / Protein: x / Nitrite: x   Leuk Esterase: x / RBC: x / WBC x   Sq Epi: x / Non Sq Epi: x / Bacteria: x            MICROBIOLOGY:       RADIOLOGY:  [x] Reviewed and interpreted by me

## 2024-04-18 NOTE — PROGRESS NOTE ADULT - NS ATTEST RISK PROBLEM GEN_ALL_CORE FT
AHRF, metastatic small cell lung cancer
AHRF, metastatic small cell lung cancer, cancer related pain
AHRF, metastatic small cell lung cancer

## 2024-04-18 NOTE — PROGRESS NOTE ADULT - NS ATTEND AMEND GEN_ALL_CORE FT
52 y/o M w/extensive stage small cell lung cancer  Pt agreeable to start chemotherapy  Discussed risks/benefits of treatment, potential side effects   Will plan to treat with carbo + etoposide tomorrow  carbo on day 1, etoposide to be given days 1-3  please check g6pd and uric acid  check tumor lysis labs daily  start allopurinol  will need growth factor after chemo completed, will give neupogen started day after chemo complete (day 4) daily for 5 days

## 2024-04-18 NOTE — PROGRESS NOTE ADULT - SUBJECTIVE AND OBJECTIVE BOX
Patient is a 53y old  Male who presents with a chief complaint of SOB (18 Apr 2024 16:01)      INTERVAL HPI/OVERNIGHT EVENTS:  Seen by me this afternoon, wife Angella at bedside, placed on HFNC overnight d/t desaturation, feeling a bit better today, could not sleep d/t issues with the bed, cough is slightly better, decreased appetite, R shoulder pain under control with percocet. +BM.    Review of Systems: 12 point review of systems otherwise negative    MEDICATIONS  (STANDING):  albuterol/ipratropium for Nebulization 3 milliLiter(s) Nebulizer every 6 hours  allopurinol 300 milliGRAM(s) Oral daily  benzonatate 100 milliGRAM(s) Oral every 8 hours  chlorhexidine 2% Cloths 1 Application(s) Topical daily  enoxaparin Injectable 40 milliGRAM(s) SubCutaneous every 24 hours  ferrous    sulfate 325 milliGRAM(s) Oral daily  influenza   Vaccine 0.5 milliLiter(s) IntraMuscular once  melatonin 6 milliGRAM(s) Oral at bedtime  multivitamin 1 Tablet(s) Oral daily  polyethylene glycol 3350 17 Gram(s) Oral daily    MEDICATIONS  (PRN):  acetaminophen     Tablet .. 650 milliGRAM(s) Oral every 6 hours PRN Temp greater or equal to 38C (100.4F), Mild Pain (1 - 3)  ALPRAZolam 0.125 milliGRAM(s) Oral two times a day PRN anxiety  aluminum hydroxide/magnesium hydroxide/simethicone Suspension 30 milliLiter(s) Oral every 4 hours PRN Dyspepsia  guaiFENesin Oral Liquid (Sugar-Free) 100 milliGRAM(s) Oral every 6 hours PRN Cough  ondansetron Injectable 4 milliGRAM(s) IV Push every 8 hours PRN Nausea and/or Vomiting  oxycodone    5 mG/acetaminophen 325 mG 1 Tablet(s) Oral every 4 hours PRN Moderate Pain to Severe Pain (4-10)      Allergies    No Known Allergies    Intolerances          Vital Signs Last 24 Hrs  T(C): 36.9 (18 Apr 2024 13:00), Max: 36.9 (18 Apr 2024 13:00)  T(F): 98.5 (18 Apr 2024 13:00), Max: 98.5 (18 Apr 2024 13:00)  HR: 99 (18 Apr 2024 15:16) (78 - 102)  BP: 128/74 (18 Apr 2024 13:00) (97/66 - 128/74)  BP(mean): --  RR: 22 (18 Apr 2024 15:16) (18 - 22)  SpO2: 95% (18 Apr 2024 15:16) (91% - 98%)    Parameters below as of 18 Apr 2024 15:16  Patient On (Oxygen Delivery Method): nasal cannula, high flow  O2 Flow (L/min): 40  O2 Concentration (%): 60  CAPILLARY BLOOD GLUCOSE          04-17 @ 07:01  -  04-18 @ 07:00  --------------------------------------------------------  IN: 200 mL / OUT: 0 mL / NET: 200 mL        Physical Exam:    Daily     Daily   General:  Well appearing, NAD, not cachetic, on HFNC 40L/50%  HEENT:  Nonicteric, PERRLA,  R supraclavicular mass  CV:  RRR, no murmur, no JVD  Lungs:   Decreased BS on R base, o/w CTA  Abdomen:  Soft, non-tender, no distended, positive BS, no hepatosplenomegaly  Extremities:  2+ pulses, no c/c, no edema  Skin:  Warm and dry, no rashes  :  No ellis  Neuro:  AAOx3, non-focal, CN II-XII grossly intact  No Restraints    LABS:                        11.8   6.26  )-----------( 282      ( 18 Apr 2024 06:30 )             35.6     04-18    136  |  101  |  9   ----------------------------<  104<H>  4.1   |  23  |  0.82    Ca    9.1      18 Apr 2024 06:30  Phos  4.0     04-18  Mg     1.90     04-18      PT/INR - ( 18 Apr 2024 06:30 )   PT: 11.2 sec;   INR: 0.99 ratio         PTT - ( 18 Apr 2024 06:30 )  PTT:33.9 sec  Urinalysis Basic - ( 18 Apr 2024 06:30 )    Color: x / Appearance: x / SG: x / pH: x  Gluc: 104 mg/dL / Ketone: x  / Bili: x / Urobili: x   Blood: x / Protein: x / Nitrite: x   Leuk Esterase: x / RBC: x / WBC x   Sq Epi: x / Non Sq Epi: x / Bacteria: x          RADIOLOGY & ADDITIONAL TESTS:  Reviewed by me

## 2024-04-18 NOTE — PROGRESS NOTE ADULT - SUBJECTIVE AND OBJECTIVE BOX
Patient is a 53y old  Male who presents with a chief complaint of SOB (18 Apr 2024 10:45)    Pt seen this afternoon, with his spouse at bedside.    MEDICATIONS  (STANDING):  albuterol/ipratropium for Nebulization 3 milliLiter(s) Nebulizer every 6 hours  benzonatate 100 milliGRAM(s) Oral every 8 hours  chlorhexidine 2% Cloths 1 Application(s) Topical daily  enoxaparin Injectable 40 milliGRAM(s) SubCutaneous every 24 hours  ferrous    sulfate 325 milliGRAM(s) Oral daily  influenza   Vaccine 0.5 milliLiter(s) IntraMuscular once  melatonin 6 milliGRAM(s) Oral at bedtime  multivitamin 1 Tablet(s) Oral daily  polyethylene glycol 3350 17 Gram(s) Oral daily    MEDICATIONS  (PRN):  acetaminophen     Tablet .. 650 milliGRAM(s) Oral every 6 hours PRN Temp greater or equal to 38C (100.4F), Mild Pain (1 - 3)  ALPRAZolam 0.125 milliGRAM(s) Oral two times a day PRN anxiety  aluminum hydroxide/magnesium hydroxide/simethicone Suspension 30 milliLiter(s) Oral every 4 hours PRN Dyspepsia  guaiFENesin Oral Liquid (Sugar-Free) 100 milliGRAM(s) Oral every 6 hours PRN Cough  ondansetron Injectable 4 milliGRAM(s) IV Push every 8 hours PRN Nausea and/or Vomiting  oxycodone    5 mG/acetaminophen 325 mG 1 Tablet(s) Oral every 4 hours PRN Moderate Pain to Severe Pain (4-10)        Vital Signs Last 24 Hrs  T(C): 36.9 (18 Apr 2024 13:00), Max: 36.9 (18 Apr 2024 13:00)  T(F): 98.5 (18 Apr 2024 13:00), Max: 98.5 (18 Apr 2024 13:00)  HR: 99 (18 Apr 2024 15:16) (78 - 102)  BP: 128/74 (18 Apr 2024 13:00) (97/66 - 128/74)  BP(mean): --  RR: 22 (18 Apr 2024 15:16) (18 - 22)  SpO2: 95% (18 Apr 2024 15:16) (91% - 98%)    Parameters below as of 18 Apr 2024 15:16  Patient On (Oxygen Delivery Method): nasal cannula, high flow  O2 Flow (L/min): 40  O2 Concentration (%): 60    PE  NAD  Awake, alert  Anicteric, MMM  high flow nasal cannula  No c/c/e  No rash grossly  FROM                          11.8   6.26  )-----------( 282      ( 18 Apr 2024 06:30 )             35.6       04-18    136  |  101  |  9   ----------------------------<  104<H>  4.1   |  23  |  0.82    Ca    9.1      18 Apr 2024 06:30  Phos  4.0     04-18  Mg     1.90     04-18

## 2024-04-18 NOTE — PROGRESS NOTE ADULT - ASSESSMENT
54 yo M smoker with Pmhx of newly diagnosed small cell lung cancer presents to the ED with worsening SOB and also, wanting a 2nd opinion, AHRF, most likely due to lung cancer causing obstructive pathology.

## 2024-04-19 LAB
ALBUMIN SERPL ELPH-MCNC: 4.1 G/DL — SIGNIFICANT CHANGE UP (ref 3.3–5)
ALP SERPL-CCNC: 80 U/L — SIGNIFICANT CHANGE UP (ref 40–120)
ALT FLD-CCNC: 13 U/L — SIGNIFICANT CHANGE UP (ref 4–41)
ANION GAP SERPL CALC-SCNC: 14 MMOL/L — SIGNIFICANT CHANGE UP (ref 7–14)
AST SERPL-CCNC: 24 U/L — SIGNIFICANT CHANGE UP (ref 4–40)
BASOPHILS # BLD AUTO: 0.02 K/UL — SIGNIFICANT CHANGE UP (ref 0–0.2)
BASOPHILS NFR BLD AUTO: 0.3 % — SIGNIFICANT CHANGE UP (ref 0–2)
BILIRUB SERPL-MCNC: 0.6 MG/DL — SIGNIFICANT CHANGE UP (ref 0.2–1.2)
BUN SERPL-MCNC: 9 MG/DL — SIGNIFICANT CHANGE UP (ref 7–23)
CALCIUM SERPL-MCNC: 9.7 MG/DL — SIGNIFICANT CHANGE UP (ref 8.4–10.5)
CHLORIDE SERPL-SCNC: 101 MMOL/L — SIGNIFICANT CHANGE UP (ref 98–107)
CO2 SERPL-SCNC: 23 MMOL/L — SIGNIFICANT CHANGE UP (ref 22–31)
CREAT SERPL-MCNC: 0.79 MG/DL — SIGNIFICANT CHANGE UP (ref 0.5–1.3)
EGFR: 107 ML/MIN/1.73M2 — SIGNIFICANT CHANGE UP
EOSINOPHIL # BLD AUTO: 0.11 K/UL — SIGNIFICANT CHANGE UP (ref 0–0.5)
EOSINOPHIL NFR BLD AUTO: 1.5 % — SIGNIFICANT CHANGE UP (ref 0–6)
GLUCOSE SERPL-MCNC: 108 MG/DL — HIGH (ref 70–99)
HCT VFR BLD CALC: 36.5 % — LOW (ref 39–50)
HGB BLD-MCNC: 12.5 G/DL — LOW (ref 13–17)
IANC: 4.88 K/UL — SIGNIFICANT CHANGE UP (ref 1.8–7.4)
IMM GRANULOCYTES NFR BLD AUTO: 0.3 % — SIGNIFICANT CHANGE UP (ref 0–0.9)
LDH SERPL L TO P-CCNC: 354 U/L — HIGH (ref 135–225)
LYMPHOCYTES # BLD AUTO: 1.62 K/UL — SIGNIFICANT CHANGE UP (ref 1–3.3)
LYMPHOCYTES # BLD AUTO: 22 % — SIGNIFICANT CHANGE UP (ref 13–44)
MAGNESIUM SERPL-MCNC: 1.8 MG/DL — SIGNIFICANT CHANGE UP (ref 1.6–2.6)
MCHC RBC-ENTMCNC: 30.5 PG — SIGNIFICANT CHANGE UP (ref 27–34)
MCHC RBC-ENTMCNC: 34.2 GM/DL — SIGNIFICANT CHANGE UP (ref 32–36)
MCV RBC AUTO: 89 FL — SIGNIFICANT CHANGE UP (ref 80–100)
MONOCYTES # BLD AUTO: 0.7 K/UL — SIGNIFICANT CHANGE UP (ref 0–0.9)
MONOCYTES NFR BLD AUTO: 9.5 % — SIGNIFICANT CHANGE UP (ref 2–14)
NEUTROPHILS # BLD AUTO: 4.88 K/UL — SIGNIFICANT CHANGE UP (ref 1.8–7.4)
NEUTROPHILS NFR BLD AUTO: 66.4 % — SIGNIFICANT CHANGE UP (ref 43–77)
NRBC # BLD: 0 /100 WBCS — SIGNIFICANT CHANGE UP (ref 0–0)
NRBC # FLD: 0 K/UL — SIGNIFICANT CHANGE UP (ref 0–0)
PHOSPHATE SERPL-MCNC: 3.5 MG/DL — SIGNIFICANT CHANGE UP (ref 2.5–4.5)
PLATELET # BLD AUTO: 304 K/UL — SIGNIFICANT CHANGE UP (ref 150–400)
POTASSIUM SERPL-MCNC: 4.2 MMOL/L — SIGNIFICANT CHANGE UP (ref 3.5–5.3)
POTASSIUM SERPL-SCNC: 4.2 MMOL/L — SIGNIFICANT CHANGE UP (ref 3.5–5.3)
PROT SERPL-MCNC: 7.1 G/DL — SIGNIFICANT CHANGE UP (ref 6–8.3)
RBC # BLD: 4.1 M/UL — LOW (ref 4.2–5.8)
RBC # FLD: 14 % — SIGNIFICANT CHANGE UP (ref 10.3–14.5)
SODIUM SERPL-SCNC: 138 MMOL/L — SIGNIFICANT CHANGE UP (ref 135–145)
URATE SERPL-MCNC: 4.9 MG/DL — SIGNIFICANT CHANGE UP (ref 3.4–8.8)
WBC # BLD: 7.35 K/UL — SIGNIFICANT CHANGE UP (ref 3.8–10.5)
WBC # FLD AUTO: 7.35 K/UL — SIGNIFICANT CHANGE UP (ref 3.8–10.5)

## 2024-04-19 PROCEDURE — 99233 SBSQ HOSP IP/OBS HIGH 50: CPT | Mod: GC

## 2024-04-19 PROCEDURE — 99233 SBSQ HOSP IP/OBS HIGH 50: CPT

## 2024-04-19 RX ORDER — FOSAPREPITANT DIMEGLUMINE 150 MG/5ML
150 INJECTION, POWDER, LYOPHILIZED, FOR SOLUTION INTRAVENOUS ONCE
Refills: 0 | Status: COMPLETED | OUTPATIENT
Start: 2024-04-19 | End: 2024-04-19

## 2024-04-19 RX ORDER — DIPHENHYDRAMINE HCL 50 MG
50 CAPSULE ORAL ONCE
Refills: 0 | Status: DISCONTINUED | OUTPATIENT
Start: 2024-04-19 | End: 2024-04-23

## 2024-04-19 RX ORDER — HYDROCORTISONE 20 MG
50 TABLET ORAL ONCE
Refills: 0 | Status: DISCONTINUED | OUTPATIENT
Start: 2024-04-19 | End: 2024-04-23

## 2024-04-19 RX ORDER — ETOPOSIDE 20 MG/ML
212 VIAL (ML) INTRAVENOUS DAILY
Refills: 0 | Status: COMPLETED | OUTPATIENT
Start: 2024-04-19 | End: 2024-04-21

## 2024-04-19 RX ORDER — DEXAMETHASONE 0.5 MG/5ML
12 ELIXIR ORAL ONCE
Refills: 0 | Status: COMPLETED | OUTPATIENT
Start: 2024-04-19 | End: 2024-04-19

## 2024-04-19 RX ORDER — CARBOPLATIN 50 MG
750 VIAL (EA) INTRAVENOUS ONCE
Refills: 0 | Status: COMPLETED | OUTPATIENT
Start: 2024-04-19 | End: 2024-04-19

## 2024-04-19 RX ORDER — PALONOSETRON HYDROCHLORIDE 0.25 MG/5ML
0.25 INJECTION, SOLUTION INTRAVENOUS ONCE
Refills: 0 | Status: COMPLETED | OUTPATIENT
Start: 2024-04-19 | End: 2024-04-19

## 2024-04-19 RX ADMIN — ENOXAPARIN SODIUM 40 MILLIGRAM(S): 100 INJECTION SUBCUTANEOUS at 21:20

## 2024-04-19 RX ADMIN — SODIUM CHLORIDE 100 MILLILITER(S): 9 INJECTION INTRAMUSCULAR; INTRAVENOUS; SUBCUTANEOUS at 06:45

## 2024-04-19 RX ADMIN — Medication 106 MILLIGRAM(S): at 12:26

## 2024-04-19 RX ADMIN — Medication 325 MILLIGRAM(S): at 14:14

## 2024-04-19 RX ADMIN — Medication 6 MILLIGRAM(S): at 21:20

## 2024-04-19 RX ADMIN — Medication 3 MILLILITER(S): at 03:44

## 2024-04-19 RX ADMIN — Medication 3 MILLILITER(S): at 08:16

## 2024-04-19 RX ADMIN — Medication 100 MILLIGRAM(S): at 14:13

## 2024-04-19 RX ADMIN — Medication 0.12 MILLIGRAM(S): at 01:19

## 2024-04-19 RX ADMIN — Medication 212 MILLIGRAM(S): at 14:52

## 2024-04-19 RX ADMIN — Medication 3 MILLILITER(S): at 22:26

## 2024-04-19 RX ADMIN — Medication 750 MILLIGRAM(S): at 14:09

## 2024-04-19 RX ADMIN — FOSAPREPITANT DIMEGLUMINE 300 MILLIGRAM(S): 150 INJECTION, POWDER, LYOPHILIZED, FOR SOLUTION INTRAVENOUS at 12:27

## 2024-04-19 RX ADMIN — PALONOSETRON HYDROCHLORIDE 0.25 MILLIGRAM(S): 0.25 INJECTION, SOLUTION INTRAVENOUS at 12:27

## 2024-04-19 RX ADMIN — Medication 100 MILLIGRAM(S): at 06:45

## 2024-04-19 RX ADMIN — CHLORHEXIDINE GLUCONATE 1 APPLICATION(S): 213 SOLUTION TOPICAL at 18:05

## 2024-04-19 RX ADMIN — Medication 1 TABLET(S): at 14:13

## 2024-04-19 RX ADMIN — Medication 300 MILLIGRAM(S): at 14:14

## 2024-04-19 RX ADMIN — Medication 100 MILLIGRAM(S): at 21:20

## 2024-04-19 NOTE — PROGRESS NOTE ADULT - SUBJECTIVE AND OBJECTIVE BOX
Patient is a 53y old  Male who presents with a chief complaint of SOB (19 Apr 2024 13:13)    SUBJECTIVE / OVERNIGHT EVENTS:    seen sitting up in bed, main issue is breathing  cannot move much w/o SOB  denies CP, nausea/vomiting, cough but no blood  urinating, tolerating diet, no BM x 2d  born in [place], smoked 2 ppd since age 13    MEDICATIONS  (STANDING):  albuterol/ipratropium for Nebulization 3 milliLiter(s) Nebulizer every 6 hours  allopurinol 300 milliGRAM(s) Oral daily  benzonatate 100 milliGRAM(s) Oral every 8 hours  CARBOplatin IVPB (eMAR) 750 milliGRAM(s) IV Intermittent once  chlorhexidine 2% Cloths 1 Application(s) Topical daily  enoxaparin Injectable 40 milliGRAM(s) SubCutaneous every 24 hours  etoposide (TOPOSAR) IVPB (eMAR) 212 milliGRAM(s) IV Intermittent daily  ferrous    sulfate 325 milliGRAM(s) Oral daily  influenza   Vaccine 0.5 milliLiter(s) IntraMuscular once  melatonin 6 milliGRAM(s) Oral at bedtime  multivitamin 1 Tablet(s) Oral daily  polyethylene glycol 3350 17 Gram(s) Oral daily    MEDICATIONS  (PRN):  acetaminophen     Tablet .. 650 milliGRAM(s) Oral every 6 hours PRN Temp greater or equal to 38C (100.4F), Mild Pain (1 - 3)  ALPRAZolam 0.125 milliGRAM(s) Oral two times a day PRN anxiety  aluminum hydroxide/magnesium hydroxide/simethicone Suspension 30 milliLiter(s) Oral every 4 hours PRN Dyspepsia  diphenhydrAMINE Injectable 50 milliGRAM(s) IV Push once PRN REACTION  guaiFENesin Oral Liquid (Sugar-Free) 100 milliGRAM(s) Oral every 6 hours PRN Cough  hydrocortisone sodium succinate Injectable 50 milliGRAM(s) IV Push once PRN REACTION  ondansetron Injectable 4 milliGRAM(s) IV Push every 8 hours PRN Nausea and/or Vomiting  oxycodone    5 mG/acetaminophen 325 mG 1 Tablet(s) Oral every 4 hours PRN Moderate Pain to Severe Pain (4-10)    T(C): 36.7 (04-19-24 @ 11:15), Max: 36.8 (04-19-24 @ 01:00)  HR: 104 (04-19-24 @ 11:15) (92 - 106)  BP: 108/76 (04-19-24 @ 11:15) (106/81 - 122/70)  RR: 18 (04-19-24 @ 11:15) (18 - 23)  SpO2: 97% (04-19-24 @ 11:15) (91% - 100%)    PHYSICAL EXAM:  GENERAL: NAD   CHEST/LUNG: Clear to auscultation bilaterally; No wheeze  HEART: R coarse BS  ABDOMEN: Soft, Nontender, Nondistended; Bowel sounds present  EXTREMITIES:   warm and well perfused, No clubbing, cyanosis, or edema  PSYCH: AAOx3  NEUROLOGY: non-focal    LABS:                        12.5   7.35  )-----------( 304      ( 19 Apr 2024 06:30 )             36.5     04-19    138  |  101  |  9   ----------------------------<  108<H>  4.2   |  23  |  0.79    Ca    9.7      19 Apr 2024 06:30  Phos  3.5     04-19  Mg     1.80     04-19    TPro  7.1  /  Alb  4.1  /  TBili  0.6  /  DBili  x   /  AST  24  /  ALT  13  /  AlkPhos  80  04-19    PT/INR - ( 18 Apr 2024 06:30 )   PT: 11.2 sec;   INR: 0.99 ratio    PTT - ( 18 Apr 2024 06:30 )  PTT:33.9 sec    Care Discussed with Consultants/Other Providers:

## 2024-04-19 NOTE — PROGRESS NOTE ADULT - ASSESSMENT
This is a 53 year old male with extensive-stage small cell lung cancer who presents with worsening shortness of breath.    1. ES-SCLC   -- Diagnosed via biopsy of L neck mass on 03/2024  -- PET/CT showed enlarged cervical and thoracic LAD, minimally FDG-avid RUL nodule  -- Outpatient he had declined chemoimmunotherapy and wanted to pursue homeopathic treatments, however now amenable to systemic therapy.  -- CTA chest shows 5.3 x 5.1 cm R paramediastinal mass, increased soft tissue density in mediastinum and sreekanth causing obstruction of RUL bronchus and significant narrowing of right mainstem bronchus   -- Will plan to treat inpatient with carboplatin D1 + etoposide D1-3 starting 04/18- orders written, consent obtained. Plan for immunotherapy as outpatient. Zarxio to follow starting day 4.  --uric acid 5.0, G6PD still pending  -- Rec IV hydration prior to chemotherapy, and check TLS labs daily.   -- After discharge, follow up with Dr. Bowen Avalos at [facility] [place].    2. Shortness of breath   -- CTA as above   -- IR consulted, bronchoscopy w stent placement cancelled as pt will receive chemotherapy  -- Rad onc consulted, no plan for RT   -- On HFNC, wean supplemental O2 as tolerated, 02 sat 92%    Will continue to follow.    Tierra Jim NP  Hematology/Oncology  [facility]  [phone] (Office)  [phone] (Alt office)  Evenings and weekends please call MD on call or office

## 2024-04-19 NOTE — PROGRESS NOTE ADULT - SUBJECTIVE AND OBJECTIVE BOX
Patient is a 53y old  Male who presents with a chief complaint of SOB (18 Apr 2024 17:23)      MEDICATIONS  (STANDING):  albuterol/ipratropium for Nebulization 3 milliLiter(s) Nebulizer every 6 hours  allopurinol 300 milliGRAM(s) Oral daily  benzonatate 100 milliGRAM(s) Oral every 8 hours  chlorhexidine 2% Cloths 1 Application(s) Topical daily  enoxaparin Injectable 40 milliGRAM(s) SubCutaneous every 24 hours  ferrous    sulfate 325 milliGRAM(s) Oral daily  influenza   Vaccine 0.5 milliLiter(s) IntraMuscular once  melatonin 6 milliGRAM(s) Oral at bedtime  multivitamin 1 Tablet(s) Oral daily  polyethylene glycol 3350 17 Gram(s) Oral daily    MEDICATIONS  (PRN):  acetaminophen     Tablet .. 650 milliGRAM(s) Oral every 6 hours PRN Temp greater or equal to 38C (100.4F), Mild Pain (1 - 3)  ALPRAZolam 0.125 milliGRAM(s) Oral two times a day PRN anxiety  aluminum hydroxide/magnesium hydroxide/simethicone Suspension 30 milliLiter(s) Oral every 4 hours PRN Dyspepsia  guaiFENesin Oral Liquid (Sugar-Free) 100 milliGRAM(s) Oral every 6 hours PRN Cough  ondansetron Injectable 4 milliGRAM(s) IV Push every 8 hours PRN Nausea and/or Vomiting  oxycodone    5 mG/acetaminophen 325 mG 1 Tablet(s) Oral every 4 hours PRN Moderate Pain to Severe Pain (4-10)      ROS  No fever, sweats, chills  No epistaxis, HA, sore throat  No CP, SOB, cough, sputum  No n/v/d, abd pain, melena, hematochezia  No edema  No rash  No anxiety  No back pain, joint pain  No bleeding, bruising  No dysuria, hematuria    Vital Signs Last 24 Hrs  T(C): 36.4 (19 Apr 2024 05:04), Max: 36.9 (18 Apr 2024 13:00)  T(F): 97.5 (19 Apr 2024 05:04), Max: 98.5 (18 Apr 2024 13:00)  HR: 104 (19 Apr 2024 05:04) (90 - 104)  BP: 112/75 (19 Apr 2024 05:04) (106/81 - 128/74)  BP(mean): --  RR: 22 (19 Apr 2024 05:04) (18 - 22)  SpO2: 92% (19 Apr 2024 05:04) (91% - 100%)    Parameters below as of 19 Apr 2024 05:04  Patient On (Oxygen Delivery Method): nasal cannula, high flow  O2 Flow (L/min): 40  O2 Concentration (%): 60    PE  NAD  Awake, alert  Anicteric, MMM  RRR  CTAB, 02 sat 92% HFNC  Abd soft, NT, ND  No c/c/e  No rash grossly                          11.8   6.26  )-----------( 282      ( 18 Apr 2024 06:30 )             35.6       04-18    136  |  101  |  9   ----------------------------<  104<H>  4.1   |  23  |  0.82    Ca    9.1      18 Apr 2024 06:30  Phos  4.0     04-18  Mg     1.90     04-18

## 2024-04-19 NOTE — PROGRESS NOTE ADULT - ASSESSMENT
53M smoker newly diagnosed small cell lung cancer presents to the ED with worsening SOB and also, wanting a 2nd opinion, AHRF, most likely due to lung cancer causing obstructive pathology to start chemo on 4/19.

## 2024-04-19 NOTE — PROGRESS NOTE ADULT - SUBJECTIVE AND OBJECTIVE BOX
PATIENT:  ROMAN AMROZINSKI  [id number]    CHIEF COMPLAINT:  Patient is a 53y old  Male who presents with a chief complaint of SOB (19 Apr 2024 06:55)      INTERVAL HISTORY/OVERNIGHT EVENTS:  - no acute events    MEDICATIONS:  MEDICATIONS  (STANDING):  albuterol/ipratropium for Nebulization 3 milliLiter(s) Nebulizer every 6 hours  allopurinol 300 milliGRAM(s) Oral daily  benzonatate 100 milliGRAM(s) Oral every 8 hours  CARBOplatin IVPB (eMAR) 750 milliGRAM(s) IV Intermittent once  chlorhexidine 2% Cloths 1 Application(s) Topical daily  enoxaparin Injectable 40 milliGRAM(s) SubCutaneous every 24 hours  etoposide (TOPOSAR) IVPB (eMAR) 212 milliGRAM(s) IV Intermittent daily  ferrous    sulfate 325 milliGRAM(s) Oral daily  influenza   Vaccine 0.5 milliLiter(s) IntraMuscular once  melatonin 6 milliGRAM(s) Oral at bedtime  multivitamin 1 Tablet(s) Oral daily  polyethylene glycol 3350 17 Gram(s) Oral daily    MEDICATIONS  (PRN):  acetaminophen     Tablet .. 650 milliGRAM(s) Oral every 6 hours PRN Temp greater or equal to 38C (100.4F), Mild Pain (1 - 3)  ALPRAZolam 0.125 milliGRAM(s) Oral two times a day PRN anxiety  aluminum hydroxide/magnesium hydroxide/simethicone Suspension 30 milliLiter(s) Oral every 4 hours PRN Dyspepsia  diphenhydrAMINE Injectable 50 milliGRAM(s) IV Push once PRN REACTION  guaiFENesin Oral Liquid (Sugar-Free) 100 milliGRAM(s) Oral every 6 hours PRN Cough  hydrocortisone sodium succinate Injectable 50 milliGRAM(s) IV Push once PRN REACTION  ondansetron Injectable 4 milliGRAM(s) IV Push every 8 hours PRN Nausea and/or Vomiting  oxycodone    5 mG/acetaminophen 325 mG 1 Tablet(s) Oral every 4 hours PRN Moderate Pain to Severe Pain (4-10)    ALLERGIES:  Allergies    No Known Allergies    Intolerances        OBJECTIVE:  ICU Vital Signs Last 24 Hrs  T(C): 36.7 (19 Apr 2024 11:15), Max: 36.8 (19 Apr 2024 01:00)  T(F): 98.1 (19 Apr 2024 11:15), Max: 98.2 (19 Apr 2024 01:00)  HR: 104 (19 Apr 2024 11:15) (92 - 106)  BP: 108/76 (19 Apr 2024 11:15) (106/81 - 122/70)  RR: 18 (19 Apr 2024 11:15) (18 - 23)  SpO2: 97% (19 Apr 2024 11:15) (91% - 100%)    O2 Parameters below as of 19 Apr 2024 11:15  Patient On (Oxygen Delivery Method): nasal cannula, high flow    PHYSICAL EXAMINATION:  General: WN/WD NAD  HEENT: PERRLA, EOMI, moist mucous membranes  Neurology: A&Ox3, nonfocal, HARRIS x 4  Respiratory: CTA B/L, normal respiratory effort, no wheezes, crackles, rales  CV: RRR, S1S2, no murmurs, rubs or gallops  Abdominal: Soft, NT, ND +BS, Last BM  Extremities: No edema, + peripheral pulses    LABS:                          12.5   7.35  )-----------( 304      ( 19 Apr 2024 06:30 )             36.5     04-19    138  |  101  |  9   ----------------------------<  108<H>  4.2   |  23  |  0.79    Ca    9.7      19 Apr 2024 06:30  Phos  3.5     04-19  Mg     1.80     04-19    TPro  7.1  /  Alb  4.1  /  TBili  0.6  /  DBili  x   /  AST  24  /  ALT  13  /  AlkPhos  80  04-19    LIVER FUNCTIONS - ( 19 Apr 2024 06:30 )  Alb: 4.1 g/dL / Pro: 7.1 g/dL / ALK PHOS: 80 U/L / ALT: 13 U/L / AST: 24 U/L / GGT: x           PT/INR - ( 18 Apr 2024 06:30 )   PT: 11.2 sec;   INR: 0.99 ratio         PTT - ( 18 Apr 2024 06:30 )  PTT:33.9 sec        Urinalysis Basic - ( 19 Apr 2024 06:30 )    Color: x / Appearance: x / SG: x / pH: x  Gluc: 108 mg/dL / Ketone: x  / Bili: x / Urobili: x   Blood: x / Protein: x / Nitrite: x   Leuk Esterase: x / RBC: x / WBC x   Sq Epi: x / Non Sq Epi: x / Bacteria: x        TELEMETRY:     EKG:     IMAGING:

## 2024-04-19 NOTE — PROGRESS NOTE ADULT - NS ATTEND AMEND GEN_ALL_CORE FT
Agree with above assessment and plan.   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 CTA chest with 5.3cm right paramediastinal mass. Initially not amenable to chemoIO and sought homeopathic treatments now in agreement. No plan for RT or IP intervention. Receiving Carbo AUC5 on Day 1 Etop 100mg/m2 on days 1-3. [place] [facility] [place] and if not if would like to complete inpatient. To start chemotherapy today.   Please monitor TLS labs daily. Agree with above assessment and plan.   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 CTA chest with 5.3cm right paramediastinal mass. Initially not amenable to chemoIO and sought homeopathic treatments now in agreement. No plan for RT or IP intervention. Receiving Carbo AUC5 on Day 1 Etop 100mg/m2 on days 1-3. [place] [facility] [place] and if not if would like to complete inpatient. To start chemotherapy today.   Please monitor TLS labs daily. Zarxio 480mcg to start on day 4

## 2024-04-19 NOTE — PROGRESS NOTE ADULT - ASSESSMENT
52 yo M current smoker with PMH small cell lung cancaer (newly diagnosed) who presents with 3-4 days of shortness of breath and LANDRUM. CT chest showed 5.3 x 5.1 cm right paramediastinal mass with extension into mediastinum and RUL obstruction. Interventional pulmonology consulted for possible airway stent.     # R paramediastinal mass  # RUL bronchus compression  Patient with recent diagnosis of SCLC at [facility] [place] [place]. Has not yet started treatment. Presenting with progressive shortness of breath and found to have paramediastinal mass with RUL airway compression.  - initially was planned for OR for rigid bronchoscopy, tumor debulking, stent placement -- however, discussed with oncology and patient. He is planned to start treatment and patient wants to wait for treatment response. If treatment is being planned inpatient, we can monitor clinically and pursue stent placement if limited or no response noted. Please note that if radiation pursued, and airway continues to be narrow, recommend stent placement prior to RT. Ok to proceed with chemotherapy. Will wait to assess treatment response prior to proceeding with stent placement.   - low threshold for pan cx, starting abx if sputum, fevers, phlegm, cough etc worsening; daily chest x-ray; daily blood gas  - appreciate heme/onc involvement

## 2024-04-20 LAB
ALBUMIN SERPL ELPH-MCNC: 4.1 G/DL — SIGNIFICANT CHANGE UP (ref 3.3–5)
ALP SERPL-CCNC: 76 U/L — SIGNIFICANT CHANGE UP (ref 40–120)
ALT FLD-CCNC: 18 U/L — SIGNIFICANT CHANGE UP (ref 4–41)
ANION GAP SERPL CALC-SCNC: 16 MMOL/L — HIGH (ref 7–14)
AST SERPL-CCNC: 23 U/L — SIGNIFICANT CHANGE UP (ref 4–40)
BASOPHILS # BLD AUTO: 0.01 K/UL — SIGNIFICANT CHANGE UP (ref 0–0.2)
BASOPHILS NFR BLD AUTO: 0.2 % — SIGNIFICANT CHANGE UP (ref 0–2)
BILIRUB SERPL-MCNC: 0.7 MG/DL — SIGNIFICANT CHANGE UP (ref 0.2–1.2)
BUN SERPL-MCNC: 12 MG/DL — SIGNIFICANT CHANGE UP (ref 7–23)
CALCIUM SERPL-MCNC: 9.7 MG/DL — SIGNIFICANT CHANGE UP (ref 8.4–10.5)
CHLORIDE SERPL-SCNC: 99 MMOL/L — SIGNIFICANT CHANGE UP (ref 98–107)
CO2 SERPL-SCNC: 21 MMOL/L — LOW (ref 22–31)
CREAT SERPL-MCNC: 0.68 MG/DL — SIGNIFICANT CHANGE UP (ref 0.5–1.3)
EGFR: 112 ML/MIN/1.73M2 — SIGNIFICANT CHANGE UP
EOSINOPHIL # BLD AUTO: 0 K/UL — SIGNIFICANT CHANGE UP (ref 0–0.5)
EOSINOPHIL NFR BLD AUTO: 0 % — SIGNIFICANT CHANGE UP (ref 0–6)
GLUCOSE SERPL-MCNC: 98 MG/DL — SIGNIFICANT CHANGE UP (ref 70–99)
HCT VFR BLD CALC: 35.6 % — LOW (ref 39–50)
HGB BLD-MCNC: 12.1 G/DL — LOW (ref 13–17)
IANC: 4.97 K/UL — SIGNIFICANT CHANGE UP (ref 1.8–7.4)
IMM GRANULOCYTES NFR BLD AUTO: 0.5 % — SIGNIFICANT CHANGE UP (ref 0–0.9)
LDH SERPL L TO P-CCNC: 289 U/L — HIGH (ref 135–225)
LYMPHOCYTES # BLD AUTO: 1.05 K/UL — SIGNIFICANT CHANGE UP (ref 1–3.3)
LYMPHOCYTES # BLD AUTO: 15.8 % — SIGNIFICANT CHANGE UP (ref 13–44)
MAGNESIUM SERPL-MCNC: 1.9 MG/DL — SIGNIFICANT CHANGE UP (ref 1.6–2.6)
MCHC RBC-ENTMCNC: 30.1 PG — SIGNIFICANT CHANGE UP (ref 27–34)
MCHC RBC-ENTMCNC: 34 GM/DL — SIGNIFICANT CHANGE UP (ref 32–36)
MCV RBC AUTO: 88.6 FL — SIGNIFICANT CHANGE UP (ref 80–100)
MONOCYTES # BLD AUTO: 0.59 K/UL — SIGNIFICANT CHANGE UP (ref 0–0.9)
MONOCYTES NFR BLD AUTO: 8.9 % — SIGNIFICANT CHANGE UP (ref 2–14)
NEUTROPHILS # BLD AUTO: 4.97 K/UL — SIGNIFICANT CHANGE UP (ref 1.8–7.4)
NEUTROPHILS NFR BLD AUTO: 74.6 % — SIGNIFICANT CHANGE UP (ref 43–77)
NRBC # BLD: 0 /100 WBCS — SIGNIFICANT CHANGE UP (ref 0–0)
NRBC # FLD: 0 K/UL — SIGNIFICANT CHANGE UP (ref 0–0)
PHOSPHATE SERPL-MCNC: 4.4 MG/DL — SIGNIFICANT CHANGE UP (ref 2.5–4.5)
PLATELET # BLD AUTO: 284 K/UL — SIGNIFICANT CHANGE UP (ref 150–400)
POTASSIUM SERPL-MCNC: 4.4 MMOL/L — SIGNIFICANT CHANGE UP (ref 3.5–5.3)
POTASSIUM SERPL-SCNC: 4.4 MMOL/L — SIGNIFICANT CHANGE UP (ref 3.5–5.3)
PROT SERPL-MCNC: 7 G/DL — SIGNIFICANT CHANGE UP (ref 6–8.3)
RBC # BLD: 4.02 M/UL — LOW (ref 4.2–5.8)
RBC # FLD: 13.9 % — SIGNIFICANT CHANGE UP (ref 10.3–14.5)
SODIUM SERPL-SCNC: 136 MMOL/L — SIGNIFICANT CHANGE UP (ref 135–145)
URATE SERPL-MCNC: 4.1 MG/DL — SIGNIFICANT CHANGE UP (ref 3.4–8.8)
WBC # BLD: 6.65 K/UL — SIGNIFICANT CHANGE UP (ref 3.8–10.5)
WBC # FLD AUTO: 6.65 K/UL — SIGNIFICANT CHANGE UP (ref 3.8–10.5)

## 2024-04-20 PROCEDURE — 99233 SBSQ HOSP IP/OBS HIGH 50: CPT

## 2024-04-20 RX ORDER — OXYCODONE AND ACETAMINOPHEN 5; 325 MG/1; MG/1
1 TABLET ORAL EVERY 4 HOURS
Refills: 0 | Status: DISCONTINUED | OUTPATIENT
Start: 2024-04-20 | End: 2024-04-23

## 2024-04-20 RX ORDER — SENNA PLUS 8.6 MG/1
2 TABLET ORAL AT BEDTIME
Refills: 0 | Status: DISCONTINUED | OUTPATIENT
Start: 2024-04-20 | End: 2024-04-23

## 2024-04-20 RX ADMIN — SENNA PLUS 2 TABLET(S): 8.6 TABLET ORAL at 22:18

## 2024-04-20 RX ADMIN — Medication 5 MILLIGRAM(S): at 18:15

## 2024-04-20 RX ADMIN — Medication 100 MILLIGRAM(S): at 14:55

## 2024-04-20 RX ADMIN — Medication 3 MILLILITER(S): at 21:55

## 2024-04-20 RX ADMIN — Medication 100 MILLIGRAM(S): at 22:18

## 2024-04-20 RX ADMIN — ENOXAPARIN SODIUM 40 MILLIGRAM(S): 100 INJECTION SUBCUTANEOUS at 22:18

## 2024-04-20 RX ADMIN — Medication 3 MILLILITER(S): at 15:21

## 2024-04-20 RX ADMIN — Medication 3 MILLILITER(S): at 03:31

## 2024-04-20 RX ADMIN — Medication 3 MILLILITER(S): at 10:03

## 2024-04-20 RX ADMIN — CHLORHEXIDINE GLUCONATE 1 APPLICATION(S): 213 SOLUTION TOPICAL at 11:37

## 2024-04-20 RX ADMIN — Medication 6 MILLIGRAM(S): at 22:18

## 2024-04-20 RX ADMIN — Medication 212 MILLIGRAM(S): at 13:05

## 2024-04-20 RX ADMIN — Medication 325 MILLIGRAM(S): at 11:35

## 2024-04-20 RX ADMIN — Medication 100 MILLIGRAM(S): at 05:56

## 2024-04-20 RX ADMIN — Medication 1 TABLET(S): at 11:35

## 2024-04-20 RX ADMIN — Medication 300 MILLIGRAM(S): at 11:35

## 2024-04-20 RX ADMIN — POLYETHYLENE GLYCOL 3350 17 GRAM(S): 17 POWDER, FOR SOLUTION ORAL at 11:34

## 2024-04-20 NOTE — PROGRESS NOTE ADULT - SUBJECTIVE AND OBJECTIVE BOX
Patient is a 53y old  Male who presents with a chief complaint of SOB (20 Apr 2024 07:02)    SUBJECTIVE / OVERNIGHT EVENTS:    no CP, SOB, n/c/f/c, tolerating diet, no BM yet, no abdominal pain  no pain in shoulder on R today  no chemo reaction, or new sx    MEDICATIONS  (STANDING):  albuterol/ipratropium for Nebulization 3 milliLiter(s) Nebulizer every 6 hours  allopurinol 300 milliGRAM(s) Oral daily  benzonatate 100 milliGRAM(s) Oral every 8 hours  chlorhexidine 2% Cloths 1 Application(s) Topical daily  enoxaparin Injectable 40 milliGRAM(s) SubCutaneous every 24 hours  etoposide (TOPOSAR) IVPB (eMAR) 212 milliGRAM(s) IV Intermittent daily  ferrous    sulfate 325 milliGRAM(s) Oral daily  influenza   Vaccine 0.5 milliLiter(s) IntraMuscular once  melatonin 6 milliGRAM(s) Oral at bedtime  multivitamin 1 Tablet(s) Oral daily  polyethylene glycol 3350 17 Gram(s) Oral daily  senna 2 Tablet(s) Oral at bedtime    MEDICATIONS  (PRN):  acetaminophen     Tablet .. 650 milliGRAM(s) Oral every 6 hours PRN Temp greater or equal to 38C (100.4F), Mild Pain (1 - 3)  ALPRAZolam 0.125 milliGRAM(s) Oral two times a day PRN anxiety  diphenhydrAMINE Injectable 50 milliGRAM(s) IV Push once PRN REACTION  guaiFENesin Oral Liquid (Sugar-Free) 100 milliGRAM(s) Oral every 6 hours PRN Cough  hydrocortisone sodium succinate Injectable 50 milliGRAM(s) IV Push once PRN REACTION  ondansetron Injectable 4 milliGRAM(s) IV Push every 8 hours PRN Nausea and/or Vomiting  oxycodone    5 mG/acetaminophen 325 mG 1 Tablet(s) Oral every 4 hours PRN Moderate Pain to Severe Pain (4-10)    T(C): 36.6 (04-20-24 @ 11:26), Max: 37.2 (04-19-24 @ 15:00)  HR: 101 (04-20-24 @ 11:26) (82 - 104)  BP: 113/70 (04-20-24 @ 11:26) (98/67 - 119/92)  RR: 19 (04-20-24 @ 11:26) (18 - 22)  SpO2: 100% (04-20-24 @ 11:26) (93% - 100%)    I&O's Summary    19 Apr 2024 07:01  -  20 Apr 2024 07:00  --------------------------------------------------------  IN: 750 mL / OUT: 0 mL / NET: 750 mL    20 Apr 2024 07:01  -  20 Apr 2024 12:05  --------------------------------------------------------  IN: 0 mL / OUT: 450 mL / NET: -450 mL    PHYSICAL EXAM:  GENERAL: NAD   CHEST/LUNG: Clear to auscultation bilaterally; No wheeze  HEART: R coarse BS  ABDOMEN: Soft, Nontender, Nondistended; Bowel sounds present  EXTREMITIES:   warm and well perfused, No clubbing, cyanosis, or edema  PSYCH: AAOx3  NEUROLOGY: non-focal    LABS:                        12.1   6.65  )-----------( 284      ( 20 Apr 2024 06:30 )             35.6     04-20    136  |  99  |  12  ----------------------------<  98  4.4   |  21<L>  |  0.68    Ca    9.7      20 Apr 2024 06:30  Phos  4.4     04-20  Mg     1.90     04-20    TPro  7.0  /  Alb  4.1  /  TBili  0.7  /  DBili  x   /  AST  23  /  ALT  18  /  AlkPhos  76  04-20    Care Discussed with Consultants/Other Providers:  Dr. Lemus    Patient is a 53y old  Male who presents with a chief complaint of SOB (20 Apr 2024 07:02)    SUBJECTIVE / OVERNIGHT EVENTS:    no CP, SOB, n/c/f/c, tolerating diet, no BM yet, no abdominal pain  no pain in shoulder on R today  no chemo reaction, or new sx    MEDICATIONS  (STANDING):  albuterol/ipratropium for Nebulization 3 milliLiter(s) Nebulizer every 6 hours  allopurinol 300 milliGRAM(s) Oral daily  benzonatate 100 milliGRAM(s) Oral every 8 hours  chlorhexidine 2% Cloths 1 Application(s) Topical daily  enoxaparin Injectable 40 milliGRAM(s) SubCutaneous every 24 hours  etoposide (TOPOSAR) IVPB (eMAR) 212 milliGRAM(s) IV Intermittent daily  ferrous    sulfate 325 milliGRAM(s) Oral daily  influenza   Vaccine 0.5 milliLiter(s) IntraMuscular once  melatonin 6 milliGRAM(s) Oral at bedtime  multivitamin 1 Tablet(s) Oral daily  polyethylene glycol 3350 17 Gram(s) Oral daily  senna 2 Tablet(s) Oral at bedtime    MEDICATIONS  (PRN):  acetaminophen     Tablet .. 650 milliGRAM(s) Oral every 6 hours PRN Temp greater or equal to 38C (100.4F), Mild Pain (1 - 3)  ALPRAZolam 0.125 milliGRAM(s) Oral two times a day PRN anxiety  diphenhydrAMINE Injectable 50 milliGRAM(s) IV Push once PRN REACTION  guaiFENesin Oral Liquid (Sugar-Free) 100 milliGRAM(s) Oral every 6 hours PRN Cough  hydrocortisone sodium succinate Injectable 50 milliGRAM(s) IV Push once PRN REACTION  ondansetron Injectable 4 milliGRAM(s) IV Push every 8 hours PRN Nausea and/or Vomiting  oxycodone    5 mG/acetaminophen 325 mG 1 Tablet(s) Oral every 4 hours PRN Moderate Pain to Severe Pain (4-10)    T(C): 36.6 (04-20-24 @ 11:26), Max: 37.2 (04-19-24 @ 15:00)  HR: 101 (04-20-24 @ 11:26) (82 - 104)  BP: 113/70 (04-20-24 @ 11:26) (98/67 - 119/92)  RR: 19 (04-20-24 @ 11:26) (18 - 22)  SpO2: 100% (04-20-24 @ 11:26) (93% - 100%)    I&O's Summary    19 Apr 2024 07:01  -  20 Apr 2024 07:00  --------------------------------------------------------  IN: 750 mL / OUT: 0 mL / NET: 750 mL    20 Apr 2024 07:01  -  20 Apr 2024 12:05  --------------------------------------------------------  IN: 0 mL / OUT: 450 mL / NET: -450 mL    PHYSICAL EXAM:  GENERAL: NAD   NECK: R supraclavicular mass  CHEST/LUNG: Clear to auscultation bilaterally; wheeze on R at times   HEART: R coarse BS  ABDOMEN: Soft, Nontender, Nondistended; Bowel sounds present  EXTREMITIES:   warm and well perfused, No clubbing, cyanosis, or edema  PSYCH: AAOx3  NEUROLOGY: non-focal    LABS:                        12.1   6.65  )-----------( 284      ( 20 Apr 2024 06:30 )             35.6     04-20    136  |  99  |  12  ----------------------------<  98  4.4   |  21<L>  |  0.68    Ca    9.7      20 Apr 2024 06:30  Phos  4.4     04-20  Mg     1.90     04-20    TPro  7.0  /  Alb  4.1  /  TBili  0.7  /  DBili  x   /  AST  23  /  ALT  18  /  AlkPhos  76  04-20    Care Discussed with Consultants/Other Providers:  Dr. Lemus

## 2024-04-20 NOTE — PROGRESS NOTE ADULT - ASSESSMENT
This is a 53 year old male with extensive-stage small cell lung cancer who presents with worsening shortness of breath.    1. ES-SCLC   -- Diagnosed via biopsy of L neck mass on 03/2024  -- PET/CT showed enlarged cervical and thoracic LAD, minimally FDG-avid RUL nodule  -- Outpatient he had declined chemoimmunotherapy and wanted to pursue homeopathic treatments, however now amenable to systemic therapy.  -- CTA chest shows 5.3 x 5.1 cm R paramediastinal mass, increased soft tissue density in mediastinum and sreekanth causing obstruction of RUL bronchus and significant narrowing of right mainstem bronchus   -- Will plan to treat inpatient with carboplatin D1 + etoposide D1-3 starting 04/18- orders written, consent obtained. Plan for immunotherapy as outpatient. Zarxio to follow starting day 4.  --uric acid 5.0, G6PD still pending  -- Rec IV hydration prior to chemotherapy each day, and check TLS labs daily.   -- Plans for herbal medicine in conjunction with chemotherapy. Advised to not take currently and will need to arrange nutrition follow up outpatient to assess if any reactions with chemoIO.  -- After discharge, follow up with Dr. Bowen Avalos at [facility] [place].    2. Shortness of breath   -- CTA as above   -- IR consulted, bronchoscopy w stent placement cancelled as pt will receive chemotherapy  -- Rad onc consulted, no plan for RT   -- On HFNC, wean supplemental O2 as tolerated, 02 sat 92%    Will continue to follow.    Ta Lemus MD  Hematology/Oncology  [facility]  [phone] (Office)  [phone] (Alt office)  Evenings and weekends please call MD on call or office     This is a 53 year old male with extensive-stage small cell lung cancer who presents with worsening shortness of breath.    1. ES-SCLC   -- Diagnosed via biopsy of L neck mass on 03/2024  -- PET/CT showed enlarged cervical and thoracic LAD, minimally FDG-avid RUL nodule  -- Outpatient he had declined chemoimmunotherapy and wanted to pursue homeopathic treatments, however now amenable to systemic therapy.  -- CTA chest shows 5.3 x 5.1 cm R paramediastinal mass, increased soft tissue density in mediastinum and sreekanth causing obstruction of RUL bronchus and significant narrowing of right mainstem bronchus   -- Will plan to treat inpatient with carboplatin D1 + etoposide D1-3 starting 04/18- orders written, consent obtained. Plan for immunotherapy as outpatient. Zarxio to follow starting day 4.  --uric acid 5.0, G6PD still pending  -- Rec IV hydration prior to chemotherapy each day, and check TLS labs daily.   -- Plans for herbal medicine in conjunction with chemotherapy. Advised to not take currently and will need to arrange nutrition follow up outpatient to assess if any reactions with chemoIO.  -- After discharge, follow up with Dr. Bowen Avalos at [facility] [place].    2. Shortness of breath   -- CTA as above   -- IR consulted, bronchoscopy w stent placement cancelled as pt will receive chemotherapy  -- Rad onc consulted, no plan for RT   -- On HFNC, wean supplemental O2 as tolerated, 02 sat 92%    Will continue to follow.    Ta Lemus MD- can contact me on TEAMS if any issues   Hematology/Oncology  [facility]  [phone] (Office)  [phone] (Alt office)  Evenings and weekends please call MD on call or office     This is a 53 year old male with extensive-stage small cell lung cancer who presents with worsening shortness of breath.    1. ES-SCLC   -- Diagnosed via biopsy of L neck mass on 03/2024  -- PET/CT showed enlarged cervical and thoracic LAD, minimally FDG-avid RUL nodule  -- Outpatient he had declined chemoimmunotherapy and wanted to pursue homeopathic treatments, however now amenable to systemic therapy.  -- CTA chest shows 5.3 x 5.1 cm R paramediastinal mass, increased soft tissue density in mediastinum and sreekanth causing obstruction of RUL bronchus and significant narrowing of right mainstem bronchus   -- Will plan to treat inpatient with carboplatin D1 + etoposide D1-3 starting 04/18- orders written, consent obtained. Plan for immunotherapy as outpatient. Zarxio to follow starting day 4.  --uric acid 4.1, LDH trending down, G6PD still pending  -- Rec IV hydration prior to chemotherapy each day, and check TLS labs daily.   -- Plans for herbal medicine in conjunction with chemotherapy. Advised to not take currently and will need to arrange nutrition follow up outpatient to assess if any reactions with chemoIO.  -- Of note MRI Brain not completed as from an accident as metal that is not compatible. He did have a CT Brain with contrast a few weeks prior which did not show metastasis  -- After discharge, follow up with Dr. Bowen Avalos at [facility] [place].    2. Shortness of breath   -- CTA as above   -- IR consulted, bronchoscopy w stent placement cancelled as pt will receive chemotherapy  -- Rad onc consulted, no plan for RT   -- On HFNC, wean supplemental O2 as tolerated, 02 sat 92%    Will continue to follow.    Ta Lemus MD- can contact me on TEAMS if any issues   Hematology/Oncology  [facility]  [phone] (Office)  [phone] (Alt office)  Evenings and weekends please call MD on call or office

## 2024-04-20 NOTE — PROGRESS NOTE ADULT - SUBJECTIVE AND OBJECTIVE BOX
INTERVAL HPI/OVERNIGHT EVENTS:  Patient S&E at bedside. No o/n events. Tolerating chemo. On high flow but breathing comfortably. Today day 2 of 3. Plan for GCSF on day 4.     VITAL SIGNS:  T(F): 97.7 (04-20-24 @ 05:45)  HR: 98 (04-20-24 @ 05:45)  BP: 105/71 (04-20-24 @ 05:45)  RR: 19 (04-20-24 @ 05:45)  SpO2: 98% (04-20-24 @ 05:45)  Wt(kg): --    PHYSICAL EXAM:    Constitutional: NAD  Eyes: EOMI, sclera non-icteric  Neck: supple  Respiratory: CTAB, no wheezes or crackles   Cardiovascular: RRR  Gastrointestinal: soft, NTND, + BS  Extremities: no cyanosis, clubbing or edema   Neurological: awake and alert      MEDICATIONS  (STANDING):  albuterol/ipratropium for Nebulization 3 milliLiter(s) Nebulizer every 6 hours  allopurinol 300 milliGRAM(s) Oral daily  benzonatate 100 milliGRAM(s) Oral every 8 hours  chlorhexidine 2% Cloths 1 Application(s) Topical daily  enoxaparin Injectable 40 milliGRAM(s) SubCutaneous every 24 hours  etoposide (TOPOSAR) IVPB (eMAR) 212 milliGRAM(s) IV Intermittent daily  ferrous    sulfate 325 milliGRAM(s) Oral daily  influenza   Vaccine 0.5 milliLiter(s) IntraMuscular once  melatonin 6 milliGRAM(s) Oral at bedtime  multivitamin 1 Tablet(s) Oral daily  polyethylene glycol 3350 17 Gram(s) Oral daily    MEDICATIONS  (PRN):  acetaminophen     Tablet .. 650 milliGRAM(s) Oral every 6 hours PRN Temp greater or equal to 38C (100.4F), Mild Pain (1 - 3)  ALPRAZolam 0.125 milliGRAM(s) Oral two times a day PRN anxiety  diphenhydrAMINE Injectable 50 milliGRAM(s) IV Push once PRN REACTION  guaiFENesin Oral Liquid (Sugar-Free) 100 milliGRAM(s) Oral every 6 hours PRN Cough  hydrocortisone sodium succinate Injectable 50 milliGRAM(s) IV Push once PRN REACTION  ondansetron Injectable 4 milliGRAM(s) IV Push every 8 hours PRN Nausea and/or Vomiting  oxycodone    5 mG/acetaminophen 325 mG 1 Tablet(s) Oral every 4 hours PRN Moderate Pain to Severe Pain (4-10)      Allergies    No Known Allergies    Intolerances        LABS:                        12.5   7.35  )-----------( 304      ( 19 Apr 2024 06:30 )             36.5     04-19    138  |  101  |  9   ----------------------------<  108<H>  4.2   |  23  |  0.79    Ca    9.7      19 Apr 2024 06:30  Phos  3.5     04-19  Mg     1.80     04-19    TPro  7.1  /  Alb  4.1  /  TBili  0.6  /  DBili  x   /  AST  24  /  ALT  13  /  AlkPhos  80  04-19      Urinalysis Basic - ( 19 Apr 2024 06:30 )    Color: x / Appearance: x / SG: x / pH: x  Gluc: 108 mg/dL / Ketone: x  / Bili: x / Urobili: x   Blood: x / Protein: x / Nitrite: x   Leuk Esterase: x / RBC: x / WBC x   Sq Epi: x / Non Sq Epi: x / Bacteria: x        RADIOLOGY & ADDITIONAL TESTS:  Studies reviewed.   INTERVAL HPI/OVERNIGHT EVENTS:  Patient S&E at bedside. No o/n events. Tolerating chemo. On high flow but breathing comfortably. Today day 2 of 3. Plan for GCSF on day 4.     VITAL SIGNS:  T(F): 97.7 (04-20-24 @ 05:45)  HR: 98 (04-20-24 @ 05:45)  BP: 105/71 (04-20-24 @ 05:45)  RR: 19 (04-20-24 @ 05:45)  SpO2: 98% (04-20-24 @ 05:45)  Wt(kg): --    PHYSICAL EXAM:    Constitutional: NAD  Eyes: EOMI, sclera non-icteric  Neck: supple, obvious right sided neck mass originating from thorax   Respiratory: right sided wheezing   Cardiovascular: RRR  Gastrointestinal: soft, NTND, + BS  Extremities: no cyanosis, clubbing or edema   Neurological: awake and alert      MEDICATIONS  (STANDING):  albuterol/ipratropium for Nebulization 3 milliLiter(s) Nebulizer every 6 hours  allopurinol 300 milliGRAM(s) Oral daily  benzonatate 100 milliGRAM(s) Oral every 8 hours  chlorhexidine 2% Cloths 1 Application(s) Topical daily  enoxaparin Injectable 40 milliGRAM(s) SubCutaneous every 24 hours  etoposide (TOPOSAR) IVPB (eMAR) 212 milliGRAM(s) IV Intermittent daily  ferrous    sulfate 325 milliGRAM(s) Oral daily  influenza   Vaccine 0.5 milliLiter(s) IntraMuscular once  melatonin 6 milliGRAM(s) Oral at bedtime  multivitamin 1 Tablet(s) Oral daily  polyethylene glycol 3350 17 Gram(s) Oral daily    MEDICATIONS  (PRN):  acetaminophen     Tablet .. 650 milliGRAM(s) Oral every 6 hours PRN Temp greater or equal to 38C (100.4F), Mild Pain (1 - 3)  ALPRAZolam 0.125 milliGRAM(s) Oral two times a day PRN anxiety  diphenhydrAMINE Injectable 50 milliGRAM(s) IV Push once PRN REACTION  guaiFENesin Oral Liquid (Sugar-Free) 100 milliGRAM(s) Oral every 6 hours PRN Cough  hydrocortisone sodium succinate Injectable 50 milliGRAM(s) IV Push once PRN REACTION  ondansetron Injectable 4 milliGRAM(s) IV Push every 8 hours PRN Nausea and/or Vomiting  oxycodone    5 mG/acetaminophen 325 mG 1 Tablet(s) Oral every 4 hours PRN Moderate Pain to Severe Pain (4-10)      Allergies    No Known Allergies    Intolerances        LABS:                        12.5   7.35  )-----------( 304      ( 19 Apr 2024 06:30 )             36.5     04-19    138  |  101  |  9   ----------------------------<  108<H>  4.2   |  23  |  0.79    Ca    9.7      19 Apr 2024 06:30  Phos  3.5     04-19  Mg     1.80     04-19    TPro  7.1  /  Alb  4.1  /  TBili  0.6  /  DBili  x   /  AST  24  /  ALT  13  /  AlkPhos  80  04-19      Urinalysis Basic - ( 19 Apr 2024 06:30 )    Color: x / Appearance: x / SG: x / pH: x  Gluc: 108 mg/dL / Ketone: x  / Bili: x / Urobili: x   Blood: x / Protein: x / Nitrite: x   Leuk Esterase: x / RBC: x / WBC x   Sq Epi: x / Non Sq Epi: x / Bacteria: x        RADIOLOGY & ADDITIONAL TESTS:  Studies reviewed.

## 2024-04-21 LAB
ALBUMIN SERPL ELPH-MCNC: 3.6 G/DL — SIGNIFICANT CHANGE UP (ref 3.3–5)
ALP SERPL-CCNC: 68 U/L — SIGNIFICANT CHANGE UP (ref 40–120)
ALT FLD-CCNC: 20 U/L — SIGNIFICANT CHANGE UP (ref 4–41)
ANION GAP SERPL CALC-SCNC: 12 MMOL/L — SIGNIFICANT CHANGE UP (ref 7–14)
AST SERPL-CCNC: 27 U/L — SIGNIFICANT CHANGE UP (ref 4–40)
BASOPHILS # BLD AUTO: 0.01 K/UL — SIGNIFICANT CHANGE UP (ref 0–0.2)
BASOPHILS NFR BLD AUTO: 0.1 % — SIGNIFICANT CHANGE UP (ref 0–2)
BILIRUB SERPL-MCNC: 0.6 MG/DL — SIGNIFICANT CHANGE UP (ref 0.2–1.2)
BUN SERPL-MCNC: 14 MG/DL — SIGNIFICANT CHANGE UP (ref 7–23)
CALCIUM SERPL-MCNC: 9.3 MG/DL — SIGNIFICANT CHANGE UP (ref 8.4–10.5)
CHLORIDE SERPL-SCNC: 101 MMOL/L — SIGNIFICANT CHANGE UP (ref 98–107)
CO2 SERPL-SCNC: 24 MMOL/L — SIGNIFICANT CHANGE UP (ref 22–31)
CREAT SERPL-MCNC: 0.78 MG/DL — SIGNIFICANT CHANGE UP (ref 0.5–1.3)
EGFR: 107 ML/MIN/1.73M2 — SIGNIFICANT CHANGE UP
EOSINOPHIL # BLD AUTO: 0.05 K/UL — SIGNIFICANT CHANGE UP (ref 0–0.5)
EOSINOPHIL NFR BLD AUTO: 0.7 % — SIGNIFICANT CHANGE UP (ref 0–6)
GLUCOSE SERPL-MCNC: 96 MG/DL — SIGNIFICANT CHANGE UP (ref 70–99)
HCT VFR BLD CALC: 35.6 % — LOW (ref 39–50)
HGB BLD-MCNC: 11.6 G/DL — LOW (ref 13–17)
IANC: 4.55 K/UL — SIGNIFICANT CHANGE UP (ref 1.8–7.4)
IMM GRANULOCYTES NFR BLD AUTO: 0.3 % — SIGNIFICANT CHANGE UP (ref 0–0.9)
LDH SERPL L TO P-CCNC: 303 U/L — HIGH (ref 135–225)
LYMPHOCYTES # BLD AUTO: 1.65 K/UL — SIGNIFICANT CHANGE UP (ref 1–3.3)
LYMPHOCYTES # BLD AUTO: 24 % — SIGNIFICANT CHANGE UP (ref 13–44)
MAGNESIUM SERPL-MCNC: 2 MG/DL — SIGNIFICANT CHANGE UP (ref 1.6–2.6)
MCHC RBC-ENTMCNC: 29.1 PG — SIGNIFICANT CHANGE UP (ref 27–34)
MCHC RBC-ENTMCNC: 32.6 GM/DL — SIGNIFICANT CHANGE UP (ref 32–36)
MCV RBC AUTO: 89.4 FL — SIGNIFICANT CHANGE UP (ref 80–100)
MONOCYTES # BLD AUTO: 0.6 K/UL — SIGNIFICANT CHANGE UP (ref 0–0.9)
MONOCYTES NFR BLD AUTO: 8.7 % — SIGNIFICANT CHANGE UP (ref 2–14)
NEUTROPHILS # BLD AUTO: 4.55 K/UL — SIGNIFICANT CHANGE UP (ref 1.8–7.4)
NEUTROPHILS NFR BLD AUTO: 66.2 % — SIGNIFICANT CHANGE UP (ref 43–77)
NRBC # BLD: 0 /100 WBCS — SIGNIFICANT CHANGE UP (ref 0–0)
NRBC # FLD: 0 K/UL — SIGNIFICANT CHANGE UP (ref 0–0)
PHOSPHATE SERPL-MCNC: 3.2 MG/DL — SIGNIFICANT CHANGE UP (ref 2.5–4.5)
PLATELET # BLD AUTO: 240 K/UL — SIGNIFICANT CHANGE UP (ref 150–400)
POTASSIUM SERPL-MCNC: 4 MMOL/L — SIGNIFICANT CHANGE UP (ref 3.5–5.3)
POTASSIUM SERPL-SCNC: 4 MMOL/L — SIGNIFICANT CHANGE UP (ref 3.5–5.3)
PROT SERPL-MCNC: 6.5 G/DL — SIGNIFICANT CHANGE UP (ref 6–8.3)
RBC # BLD: 3.98 M/UL — LOW (ref 4.2–5.8)
RBC # FLD: 14 % — SIGNIFICANT CHANGE UP (ref 10.3–14.5)
SODIUM SERPL-SCNC: 137 MMOL/L — SIGNIFICANT CHANGE UP (ref 135–145)
URATE SERPL-MCNC: 3.7 MG/DL — SIGNIFICANT CHANGE UP (ref 3.4–8.8)
WBC # BLD: 6.88 K/UL — SIGNIFICANT CHANGE UP (ref 3.8–10.5)
WBC # FLD AUTO: 6.88 K/UL — SIGNIFICANT CHANGE UP (ref 3.8–10.5)

## 2024-04-21 PROCEDURE — 99233 SBSQ HOSP IP/OBS HIGH 50: CPT

## 2024-04-21 RX ORDER — FLUTICASONE PROPIONATE 50 MCG
1 SPRAY, SUSPENSION NASAL
Refills: 0 | Status: DISCONTINUED | OUTPATIENT
Start: 2024-04-21 | End: 2024-04-23

## 2024-04-21 RX ORDER — SODIUM CHLORIDE 9 MG/ML
1000 INJECTION INTRAMUSCULAR; INTRAVENOUS; SUBCUTANEOUS ONCE
Refills: 0 | Status: COMPLETED | OUTPATIENT
Start: 2024-04-21 | End: 2024-04-21

## 2024-04-21 RX ORDER — ASCORBIC ACID 60 MG
500 TABLET,CHEWABLE ORAL DAILY
Refills: 0 | Status: DISCONTINUED | OUTPATIENT
Start: 2024-04-21 | End: 2024-04-23

## 2024-04-21 RX ORDER — LORATADINE 10 MG/1
10 TABLET ORAL DAILY
Refills: 0 | Status: DISCONTINUED | OUTPATIENT
Start: 2024-04-21 | End: 2024-04-23

## 2024-04-21 RX ADMIN — Medication 500 MILLIGRAM(S): at 11:50

## 2024-04-21 RX ADMIN — Medication 3 MILLILITER(S): at 22:17

## 2024-04-21 RX ADMIN — Medication 325 MILLIGRAM(S): at 11:54

## 2024-04-21 RX ADMIN — Medication 1 TABLET(S): at 11:54

## 2024-04-21 RX ADMIN — Medication 212 MILLIGRAM(S): at 13:08

## 2024-04-21 RX ADMIN — SENNA PLUS 2 TABLET(S): 8.6 TABLET ORAL at 22:00

## 2024-04-21 RX ADMIN — Medication 3 MILLILITER(S): at 08:37

## 2024-04-21 RX ADMIN — Medication 1 SPRAY(S): at 17:01

## 2024-04-21 RX ADMIN — CHLORHEXIDINE GLUCONATE 1 APPLICATION(S): 213 SOLUTION TOPICAL at 11:50

## 2024-04-21 RX ADMIN — LORATADINE 10 MILLIGRAM(S): 10 TABLET ORAL at 11:50

## 2024-04-21 RX ADMIN — Medication 300 MILLIGRAM(S): at 11:55

## 2024-04-21 RX ADMIN — Medication 3 MILLILITER(S): at 03:13

## 2024-04-21 RX ADMIN — ENOXAPARIN SODIUM 40 MILLIGRAM(S): 100 INJECTION SUBCUTANEOUS at 22:01

## 2024-04-21 RX ADMIN — SODIUM CHLORIDE 1000 MILLILITER(S): 9 INJECTION INTRAMUSCULAR; INTRAVENOUS; SUBCUTANEOUS at 11:58

## 2024-04-21 RX ADMIN — Medication 100 MILLIGRAM(S): at 05:52

## 2024-04-21 RX ADMIN — Medication 3 MILLILITER(S): at 15:59

## 2024-04-21 RX ADMIN — Medication 6 MILLIGRAM(S): at 22:00

## 2024-04-21 NOTE — PROGRESS NOTE ADULT - ASSESSMENT
53M smoker newly diagnosed small cell lung cancer presents to the ED with worsening SOB and also, wanting a 2nd opinion, AHRF, most likely due to lung cancer causing obstructive pathology to IP chemo on 4/19-4/21.

## 2024-04-21 NOTE — PROGRESS NOTE ADULT - SUBJECTIVE AND OBJECTIVE BOX
INTERVAL HPI/OVERNIGHT EVENTS:  Patient S&E at bedside. No o/n events. Did well with day 2 chemo, today day 3. Plan for Zarxio tomorrow. Still gets short of breath with exertion, remains on high flow.     VITAL SIGNS:  T(F): 98.2 (04-21-24 @ 02:00)  HR: 95 (04-21-24 @ 03:13)  BP: 114/73 (04-21-24 @ 02:00)  RR: 18 (04-21-24 @ 03:12)  SpO2: 96% (04-21-24 @ 03:13)  Wt(kg): --    PHYSICAL EXAM:    Constitutional: NAD  Eyes: EOMI, sclera non-icteric  Neck: supple, obvious right sided neck mass originating from thorax   Respiratory: right sided wheezing   Cardiovascular: RRR  Gastrointestinal: soft, NTND, + BS  Extremities: no cyanosis, clubbing or edema   Neurological: awake and alert      MEDICATIONS  (STANDING):  albuterol/ipratropium for Nebulization 3 milliLiter(s) Nebulizer every 6 hours  allopurinol 300 milliGRAM(s) Oral daily  chlorhexidine 2% Cloths 1 Application(s) Topical daily  enoxaparin Injectable 40 milliGRAM(s) SubCutaneous every 24 hours  etoposide (TOPOSAR) IVPB (eMAR) 212 milliGRAM(s) IV Intermittent daily  ferrous    sulfate 325 milliGRAM(s) Oral daily  influenza   Vaccine 0.5 milliLiter(s) IntraMuscular once  melatonin 6 milliGRAM(s) Oral at bedtime  multivitamin 1 Tablet(s) Oral daily  polyethylene glycol 3350 17 Gram(s) Oral daily  senna 2 Tablet(s) Oral at bedtime    MEDICATIONS  (PRN):  acetaminophen     Tablet .. 650 milliGRAM(s) Oral every 6 hours PRN Temp greater or equal to 38C (100.4F), Mild Pain (1 - 3)  ALPRAZolam 0.125 milliGRAM(s) Oral two times a day PRN anxiety  bisacodyl 5 milliGRAM(s) Oral every 12 hours PRN Constipation  diphenhydrAMINE Injectable 50 milliGRAM(s) IV Push once PRN REACTION  guaiFENesin Oral Liquid (Sugar-Free) 100 milliGRAM(s) Oral every 6 hours PRN Cough  hydrocortisone sodium succinate Injectable 50 milliGRAM(s) IV Push once PRN REACTION  ondansetron Injectable 4 milliGRAM(s) IV Push every 8 hours PRN Nausea and/or Vomiting  oxycodone    5 mG/acetaminophen 325 mG 1 Tablet(s) Oral every 4 hours PRN Moderate Pain to Severe Pain (4-10)      Allergies    No Known Allergies    Intolerances        LABS:                        12.1   6.65  )-----------( 284      ( 20 Apr 2024 06:30 )             35.6     04-20    136  |  99  |  12  ----------------------------<  98  4.4   |  21<L>  |  0.68    Ca    9.7      20 Apr 2024 06:30  Phos  4.4     04-20  Mg     1.90     04-20    TPro  7.0  /  Alb  4.1  /  TBili  0.7  /  DBili  x   /  AST  23  /  ALT  18  /  AlkPhos  76  04-20      Urinalysis Basic - ( 20 Apr 2024 06:30 )    Color: x / Appearance: x / SG: x / pH: x  Gluc: 98 mg/dL / Ketone: x  / Bili: x / Urobili: x   Blood: x / Protein: x / Nitrite: x   Leuk Esterase: x / RBC: x / WBC x   Sq Epi: x / Non Sq Epi: x / Bacteria: x        RADIOLOGY & ADDITIONAL TESTS:  Studies reviewed.

## 2024-04-21 NOTE — PROGRESS NOTE ADULT - ASSESSMENT
This is a 53 year old male with extensive-stage small cell lung cancer who presents with worsening shortness of breath.    1. ES-SCLC   -- Diagnosed via biopsy of L neck mass on 03/2024  -- PET/CT showed enlarged cervical and thoracic LAD, minimally FDG-avid RUL nodule  -- Outpatient he had declined chemoimmunotherapy and wanted to pursue homeopathic treatments, however now amenable to systemic therapy.  -- CTA chest shows 5.3 x 5.1 cm R paramediastinal mass, increased soft tissue density in mediastinum and sreekanth causing obstruction of RUL bronchus and significant narrowing of right mainstem bronchus   -- Continue with carboplatin D1 + etoposide D1-3 4/19-4/21. Plan for immunotherapy as outpatient. Zarxio to follow starting day 4- 480mcg daily on 4/22   --uric acid 4.1, LDH trending down, G6PD still pending  -- Rec IV hydration prior to chemotherapy each day, and check TLS labs daily.   -- Plans for herbal medicine in conjunction with chemotherapy. Advised to not take currently and will need to arrange nutrition follow up outpatient to assess if any reactions with chemoIO.  -- Of note MRI Brain not completed as from an accident as metal that is not compatible. He did have a CT Brain with contrast a few weeks prior which did not show metastasis  -- After discharge, follow up with Dr. Bowen Avalos at [facility] [place].    2. Shortness of breath   -- CTA as above   -- IR consulted, bronchoscopy w stent placement cancelled as pt will receive chemotherapy  -- Rad onc consulted, no plan for RT   -- On HFNC, wean supplemental O2 as tolerated, 02 sat 92%    Will continue to follow.    Ta Lemus MD- can contact me on TEAMS if any issues   Hematology/Oncology  [facility]  [phone] (Office)  [phone] (Alt office)  Evenings and weekends please call MD on call or office

## 2024-04-21 NOTE — PROGRESS NOTE ADULT - SUBJECTIVE AND OBJECTIVE BOX
Patient is a 53y old  Male who presents with a chief complaint of SOB (21 Apr 2024 06:21)    SUBJECTIVE / OVERNIGHT EVENTS:    no CP, SOB, f/c/n/v  appetite isn't great, had BM, has not been OOB or moving much  at home was using "hydrogen" not O2, not rx by MD    was on 50L 75% this am, i downtitrated to 50L 55%, he walked in place and back and forth, sat remained >90%  advised goal is to decreased HFNC and attempt to get to NC as cannot dc on HFNC and he wants to go home    MEDICATIONS  (STANDING):  albuterol/ipratropium for Nebulization 3 milliLiter(s) Nebulizer every 6 hours  allopurinol 300 milliGRAM(s) Oral daily  ascorbic acid 500 milliGRAM(s) Oral daily  chlorhexidine 2% Cloths 1 Application(s) Topical daily  enoxaparin Injectable 40 milliGRAM(s) SubCutaneous every 24 hours  etoposide (TOPOSAR) IVPB (eMAR) 212 milliGRAM(s) IV Intermittent daily  ferrous    sulfate 325 milliGRAM(s) Oral daily  fluticasone propionate 50 MICROgram(s)/spray Nasal Spray 1 Spray(s) Both Nostrils two times a day  influenza   Vaccine 0.5 milliLiter(s) IntraMuscular once  loratadine 10 milliGRAM(s) Oral daily  melatonin 6 milliGRAM(s) Oral at bedtime  multivitamin 1 Tablet(s) Oral daily  polyethylene glycol 3350 17 Gram(s) Oral daily  senna 2 Tablet(s) Oral at bedtime    MEDICATIONS  (PRN):  acetaminophen     Tablet .. 650 milliGRAM(s) Oral every 6 hours PRN Temp greater or equal to 38C (100.4F), Mild Pain (1 - 3)  ALPRAZolam 0.125 milliGRAM(s) Oral two times a day PRN anxiety  bisacodyl 5 milliGRAM(s) Oral every 12 hours PRN Constipation  diphenhydrAMINE Injectable 50 milliGRAM(s) IV Push once PRN REACTION  guaiFENesin Oral Liquid (Sugar-Free) 100 milliGRAM(s) Oral every 6 hours PRN Cough  hydrocortisone sodium succinate Injectable 50 milliGRAM(s) IV Push once PRN REACTION  ondansetron Injectable 4 milliGRAM(s) IV Push every 8 hours PRN Nausea and/or Vomiting  oxycodone    5 mG/acetaminophen 325 mG 1 Tablet(s) Oral every 4 hours PRN Moderate Pain to Severe Pain (4-10)    T(C): 36.6 (04-21-24 @ 06:00), Max: 36.8 (04-21-24 @ 02:00)  HR: 93 (04-21-24 @ 11:05) (78 - 101)  BP: 110/64 (04-21-24 @ 06:00) (97/65 - 114/73)  RR: 15 (04-21-24 @ 11:05) (15 - 20)  SpO2: 93% (04-21-24 @ 11:05) (93% - 100%)    I&O's Summary    20 Apr 2024 07:01  -  21 Apr 2024 07:00  --------------------------------------------------------  IN: 200 mL / OUT: 1050 mL / NET: -850 mL    PHYSICAL EXAM:  GENERAL: NAD   NECK: R supraclavicular mass  CHEST/LUNG: Clear to auscultation bilaterally; wheeze on R at times   HEART: R coarse BS w/ intermittent wheeze  ABDOMEN: Soft, Nontender, Nondistended; Bowel sounds present  EXTREMITIES:   warm and well perfused, No clubbing, cyanosis, or edema  PSYCH: AAOx3  NEUROLOGY: non-focal    LABS:                        11.6   6.88  )-----------( 240      ( 21 Apr 2024 06:35 )             35.6     04-21    137  |  101  |  14  ----------------------------<  96  4.0   |  24  |  0.78    Ca    9.3      21 Apr 2024 06:35  Phos  3.2     04-21  Mg     2.00     04-21    TPro  6.5  /  Alb  3.6  /  TBili  0.6  /  DBili  x   /  AST  27  /  ALT  20  /  AlkPhos  68  04-21    Care Discussed with Consultants/Other Providers:

## 2024-04-22 LAB
ALBUMIN SERPL ELPH-MCNC: 3.6 G/DL — SIGNIFICANT CHANGE UP (ref 3.3–5)
ALP SERPL-CCNC: 67 U/L — SIGNIFICANT CHANGE UP (ref 40–120)
ALT FLD-CCNC: 23 U/L — SIGNIFICANT CHANGE UP (ref 4–41)
ANION GAP SERPL CALC-SCNC: 12 MMOL/L — SIGNIFICANT CHANGE UP (ref 7–14)
AST SERPL-CCNC: 26 U/L — SIGNIFICANT CHANGE UP (ref 4–40)
BASOPHILS # BLD AUTO: 0.01 K/UL — SIGNIFICANT CHANGE UP (ref 0–0.2)
BASOPHILS NFR BLD AUTO: 0.2 % — SIGNIFICANT CHANGE UP (ref 0–2)
BILIRUB SERPL-MCNC: 0.6 MG/DL — SIGNIFICANT CHANGE UP (ref 0.2–1.2)
BUN SERPL-MCNC: 10 MG/DL — SIGNIFICANT CHANGE UP (ref 7–23)
CALCIUM SERPL-MCNC: 9 MG/DL — SIGNIFICANT CHANGE UP (ref 8.4–10.5)
CHLORIDE SERPL-SCNC: 100 MMOL/L — SIGNIFICANT CHANGE UP (ref 98–107)
CO2 SERPL-SCNC: 25 MMOL/L — SIGNIFICANT CHANGE UP (ref 22–31)
CREAT SERPL-MCNC: 0.66 MG/DL — SIGNIFICANT CHANGE UP (ref 0.5–1.3)
EGFR: 113 ML/MIN/1.73M2 — SIGNIFICANT CHANGE UP
EOSINOPHIL # BLD AUTO: 0.11 K/UL — SIGNIFICANT CHANGE UP (ref 0–0.5)
EOSINOPHIL NFR BLD AUTO: 2 % — SIGNIFICANT CHANGE UP (ref 0–6)
G6PD RBC-CCNC: 12.7 U/G HGB — SIGNIFICANT CHANGE UP (ref 7–20.5)
GLUCOSE SERPL-MCNC: 91 MG/DL — SIGNIFICANT CHANGE UP (ref 70–99)
HCT VFR BLD CALC: 33.6 % — LOW (ref 39–50)
HGB BLD-MCNC: 11.5 G/DL — LOW (ref 13–17)
IANC: 3.28 K/UL — SIGNIFICANT CHANGE UP (ref 1.8–7.4)
IMM GRANULOCYTES NFR BLD AUTO: 0.2 % — SIGNIFICANT CHANGE UP (ref 0–0.9)
LDH SERPL L TO P-CCNC: 288 U/L — HIGH (ref 135–225)
LYMPHOCYTES # BLD AUTO: 1.68 K/UL — SIGNIFICANT CHANGE UP (ref 1–3.3)
LYMPHOCYTES # BLD AUTO: 30.9 % — SIGNIFICANT CHANGE UP (ref 13–44)
MAGNESIUM SERPL-MCNC: 2 MG/DL — SIGNIFICANT CHANGE UP (ref 1.6–2.6)
MCHC RBC-ENTMCNC: 30.4 PG — SIGNIFICANT CHANGE UP (ref 27–34)
MCHC RBC-ENTMCNC: 34.2 GM/DL — SIGNIFICANT CHANGE UP (ref 32–36)
MCV RBC AUTO: 88.9 FL — SIGNIFICANT CHANGE UP (ref 80–100)
MONOCYTES # BLD AUTO: 0.35 K/UL — SIGNIFICANT CHANGE UP (ref 0–0.9)
MONOCYTES NFR BLD AUTO: 6.4 % — SIGNIFICANT CHANGE UP (ref 2–14)
NEUTROPHILS # BLD AUTO: 3.28 K/UL — SIGNIFICANT CHANGE UP (ref 1.8–7.4)
NEUTROPHILS NFR BLD AUTO: 60.3 % — SIGNIFICANT CHANGE UP (ref 43–77)
NRBC # BLD: 0 /100 WBCS — SIGNIFICANT CHANGE UP (ref 0–0)
NRBC # FLD: 0 K/UL — SIGNIFICANT CHANGE UP (ref 0–0)
PHOSPHATE SERPL-MCNC: 3.3 MG/DL — SIGNIFICANT CHANGE UP (ref 2.5–4.5)
PLATELET # BLD AUTO: 239 K/UL — SIGNIFICANT CHANGE UP (ref 150–400)
POTASSIUM SERPL-MCNC: 4 MMOL/L — SIGNIFICANT CHANGE UP (ref 3.5–5.3)
POTASSIUM SERPL-SCNC: 4 MMOL/L — SIGNIFICANT CHANGE UP (ref 3.5–5.3)
PROT SERPL-MCNC: 6.3 G/DL — SIGNIFICANT CHANGE UP (ref 6–8.3)
RBC # BLD: 3.78 M/UL — LOW (ref 4.2–5.8)
RBC # FLD: 13.7 % — SIGNIFICANT CHANGE UP (ref 10.3–14.5)
SODIUM SERPL-SCNC: 137 MMOL/L — SIGNIFICANT CHANGE UP (ref 135–145)
URATE SERPL-MCNC: 3.5 MG/DL — SIGNIFICANT CHANGE UP (ref 3.4–8.8)
WBC # BLD: 5.44 K/UL — SIGNIFICANT CHANGE UP (ref 3.8–10.5)
WBC # FLD AUTO: 5.44 K/UL — SIGNIFICANT CHANGE UP (ref 3.8–10.5)

## 2024-04-22 PROCEDURE — 99232 SBSQ HOSP IP/OBS MODERATE 35: CPT | Mod: GC

## 2024-04-22 PROCEDURE — 99233 SBSQ HOSP IP/OBS HIGH 50: CPT | Mod: GC

## 2024-04-22 RX ORDER — FILGRASTIM 480MCG/1.6
480 VIAL (ML) INJECTION DAILY
Refills: 0 | Status: DISCONTINUED | OUTPATIENT
Start: 2024-04-22 | End: 2024-04-23

## 2024-04-22 RX ADMIN — Medication 1 SPRAY(S): at 05:06

## 2024-04-22 RX ADMIN — Medication 3 MILLILITER(S): at 16:32

## 2024-04-22 RX ADMIN — Medication 325 MILLIGRAM(S): at 11:53

## 2024-04-22 RX ADMIN — CHLORHEXIDINE GLUCONATE 1 APPLICATION(S): 213 SOLUTION TOPICAL at 11:56

## 2024-04-22 RX ADMIN — Medication 1 SPRAY(S): at 17:56

## 2024-04-22 RX ADMIN — LORATADINE 10 MILLIGRAM(S): 10 TABLET ORAL at 11:55

## 2024-04-22 RX ADMIN — Medication 300 MILLIGRAM(S): at 11:55

## 2024-04-22 RX ADMIN — Medication 480 MICROGRAM(S): at 12:05

## 2024-04-22 RX ADMIN — ENOXAPARIN SODIUM 40 MILLIGRAM(S): 100 INJECTION SUBCUTANEOUS at 21:49

## 2024-04-22 RX ADMIN — Medication 500 MILLIGRAM(S): at 11:54

## 2024-04-22 RX ADMIN — Medication 6 MILLIGRAM(S): at 21:49

## 2024-04-22 RX ADMIN — Medication 3 MILLILITER(S): at 09:45

## 2024-04-22 RX ADMIN — Medication 1 TABLET(S): at 11:54

## 2024-04-22 RX ADMIN — Medication 3 MILLILITER(S): at 03:12

## 2024-04-22 RX ADMIN — Medication 3 MILLILITER(S): at 20:07

## 2024-04-22 NOTE — PROGRESS NOTE ADULT - ASSESSMENT
52 yo M current smoker with PMH small cell lung cancaer (newly diagnosed) who presents with 3-4 days of shortness of breath and LANDRUM. CT chest showed 5.3 x 5.1 cm right paramediastinal mass with extension into mediastinum and RUL obstruction. Interventional pulmonology consulted for possible airway stent.     # R paramediastinal mass  # RUL bronchus compression  [place] [facility] [place] yet started treatment. Presenting with progressive shortness of breath and found to have paramediastinal mass with RUL airway compression.  - initially was planned for OR for rigid bronchoscopy, tumor debulking, stent placement -- however, discussed with oncology and patient. He is planned to start treatment and patient wants to wait for treatment response. If treatment is being planned inpatient, we can monitor clinically and pursue stent placement if limited or no response noted. Please note that if radiation pursued, and airway continues to be narrow, recommend stent placement prior to RT. Ok to proceed with chemotherapy. Will wait to assess treatment response prior to proceeding with stent placement.   - appreciate heme/onc involvement    54 yo M current smoker with PMH small cell lung cancaer (newly diagnosed) who presents with 3-4 days of shortness of breath and LANDRUM. CT chest showed 5.3 x 5.1 cm right paramediastinal mass with extension into mediastinum and RUL obstruction. Interventional pulmonology consulted for possible airway stent.     # R paramediastinal mass  # RUL bronchus compression  # Acute hypoxemic respiratory failure  Patient with recent diagnosis of SCLC at [facility] [place]. Has not yet started treatment. Presenting with progressive shortness of breath and found to have paramediastinal mass with RUL airway compression.  - initially was planned for OR for rigid bronchoscopy, tumor debulking, stent placement -- however, deferred per pt request for evaluation of response to chemotharapy  - S/p carboplatin D1 + etoposide D1-3 4/19-4/21  - Since chemo pt has had improvement in dyspnea and hypoxemic respiratory failure  - No plans for bronchial stenting at this time    Case discussed with Dr. Goldberg

## 2024-04-22 NOTE — PROGRESS NOTE ADULT - SUBJECTIVE AND OBJECTIVE BOX
PULMONARY SERVICE FOLLOW UP CONSULT NOTE    SUBJECTIVE:  Dyspnea overall improving.      REVIEW OF SYSTEMS:  All additional ROS negative.    MEDICATIONS:  Pulmonary:  albuterol/ipratropium for Nebulization 3 milliLiter(s) Nebulizer every 6 hours  diphenhydrAMINE Injectable 50 milliGRAM(s) IV Push once PRN  guaiFENesin Oral Liquid (Sugar-Free) 100 milliGRAM(s) Oral every 6 hours PRN  loratadine 10 milliGRAM(s) Oral daily    Antimicrobials:    Anticoagulants:  enoxaparin Injectable 40 milliGRAM(s) SubCutaneous every 24 hours    Onc:    GI/:  bisacodyl 5 milliGRAM(s) Oral every 12 hours PRN  polyethylene glycol 3350 17 Gram(s) Oral daily  senna 2 Tablet(s) Oral at bedtime    Endocrine:  allopurinol 300 milliGRAM(s) Oral daily  hydrocortisone sodium succinate Injectable 50 milliGRAM(s) IV Push once PRN    Cardiac:    Other Medications:  acetaminophen     Tablet .. 650 milliGRAM(s) Oral every 6 hours PRN  ALPRAZolam 0.125 milliGRAM(s) Oral two times a day PRN  ascorbic acid 500 milliGRAM(s) Oral daily  chlorhexidine 2% Cloths 1 Application(s) Topical daily  ferrous    sulfate 325 milliGRAM(s) Oral daily  filgrastim-sndz (ZARXIO) Injectable 480 MICROGram(s) SubCutaneous daily  fluticasone propionate 50 MICROgram(s)/spray Nasal Spray 1 Spray(s) Both Nostrils two times a day  influenza   Vaccine 0.5 milliLiter(s) IntraMuscular once  melatonin 6 milliGRAM(s) Oral at bedtime  multivitamin 1 Tablet(s) Oral daily  ondansetron Injectable 4 milliGRAM(s) IV Push every 8 hours PRN  oxycodone    5 mG/acetaminophen 325 mG 1 Tablet(s) Oral every 4 hours PRN      PHYSICAL EXAM  Vital Signs Last 24 Hrs  T(C): 37.3 (22 Apr 2024 14:11), Max: 37.3 (22 Apr 2024 14:11)  T(F): 99.1 (22 Apr 2024 14:11), Max: 99.1 (22 Apr 2024 14:11)  HR: 94 (22 Apr 2024 16:32) (84 - 102)  BP: 99/64 (22 Apr 2024 14:11) (99/64 - 107/62)  BP(mean): 75 (22 Apr 2024 14:11) (75 - 79)  RR: 18 (22 Apr 2024 14:11) (16 - 18)  SpO2: 95% (22 Apr 2024 16:32) (94% - 100%)    Parameters below as of 22 Apr 2024 14:11  Patient On (Oxygen Delivery Method): nasal cannula  O2 Flow (L/min): 2      04-21 @ 07:01  -  04-22 @ 07:00  --------------------------------------------------------  IN: 1850 mL / OUT: 1075 mL / NET: 775 mL    04-22 @ 07:01  -  04-22 @ 18:03  --------------------------------------------------------  IN: 0 mL / OUT: 300 mL / NET: -300 mL            CONSTITUTIONAL: No acute distress.   HEENT:  Conjunctiva clear B/L.  Moist oral mucosa.   Cardiovascular: RRR with no murmurs. No JVD noted. No lower extremity edema B/L. Extremities are warm and well perfused.    Respiratory: Dec BS on the right. No accessory muscle use.   Gastrointestinal:  Soft, nontender. Non-distended. Non-rigid.    Neurologic:  Alert and awake. Moving all extremities. Following commands.    Skin:  No gross rashes notes.    LABS:      CBC Full  -  ( 22 Apr 2024 06:35 )  WBC Count : 5.44 K/uL  RBC Count : 3.78 M/uL  Hemoglobin : 11.5 g/dL  Hematocrit : 33.6 %  Platelet Count - Automated : 239 K/uL  Mean Cell Volume : 88.9 fL  Mean Cell Hemoglobin : 30.4 pg  Mean Cell Hemoglobin Concentration : 34.2 gm/dL  Auto Neutrophil # : 3.28 K/uL  Auto Lymphocyte # : 1.68 K/uL  Auto Monocyte # : 0.35 K/uL  Auto Eosinophil # : 0.11 K/uL  Auto Basophil # : 0.01 K/uL  Auto Neutrophil % : 60.3 %  Auto Lymphocyte % : 30.9 %  Auto Monocyte % : 6.4 %  Auto Eosinophil % : 2.0 %  Auto Basophil % : 0.2 %    04-22    137  |  100  |  10  ----------------------------<  91  4.0   |  25  |  0.66    Ca    9.0      22 Apr 2024 06:35  Phos  3.3     04-22  Mg     2.00     04-22    TPro  6.3  /  Alb  3.6  /  TBili  0.6  /  DBili  x   /  AST  26  /  ALT  23  /  AlkPhos  67  04-22          Urinalysis Basic - ( 22 Apr 2024 06:35 )    Color: x / Appearance: x / SG: x / pH: x  Gluc: 91 mg/dL / Ketone: x  / Bili: x / Urobili: x   Blood: x / Protein: x / Nitrite: x   Leuk Esterase: x / RBC: x / WBC x   Sq Epi: x / Non Sq Epi: x / Bacteria: x

## 2024-04-22 NOTE — PROGRESS NOTE ADULT - ATTENDING COMMENTS
53M PMH active smoker and recently diagnosed with extensive stage small cell lung cancer (via biopsy of left neck mass in March 2024) who presents with dyspnea due to enlarging right paramediastinal mass with increased soft tissue density in the mediastinum and sreekanth causing of the RUL bronchus and significant narrowing of the right mainstem bronchus. He was being evaluated for possible bronchial stent, but decision was made to proceed with chemotherapy and assess for improvement. S/p carboplatin and etoposide 4/19-4/21. Now with improvement of dyspnea and decreased FiO2 requirement. Does not require bronchial stenting at this time. Continue supplemental oxygen with NC@2 LPM at rest and 4 LPM with exertion. Room air oxygen saturation at rest is 87%, so he requires home oxygen. Needs close outpatient follow-up with pulmonary and interventional pulmonary. Discussed with patient and wife at bedside.
Agree with above. Patient with extensive stage SCLC, with malignant central airway obstruction, with narrowing of BI. IP was consulted for stent placement/tumor debulking. While originally planned for OR today, after discussion with oncology and based on patient preference, they prefer to wait and treat inpatient. Given rapid response of small cell with systemic therapy, ok to monitor airway from IP standpoint if inhouse chemo being pursued. If no response, or any progressive airway symptoms noted, will pursue airway intervention. Would recommend chemotherapy at this time. If radiation being pursued upfront, would prefer airway stenting prior to radiation in the absence of systemic chemo, IP team to follow closely. Please inform IP team immediately if any worsening respiratory symptoms/status noted or if there is any delay in systemic therapy in which case we would prefer to proceed with airway intervention.     IP team to follow. Discussed with onc, patient as well as thoracic surgery .
The patient is a 53 Y.O. male with pmh of active smoker, recent diagnosis of SCLC who presents with SOB.     Patient is S/P CTA chest without PO but with large mass with near obs of the RLL. Initially planned for tumor debulking possible stent, now on hold as patient is being started on chemo therapy.     # SCLC  # Bronchial narrowing  # acute hypoxemic respiratory failure  - Hypoxemia likely 2/2 to SCLC, currently on chemo  - C/W HFNC, check ABG to assess for PF ratio. Wean as tolerated  - IP procedure on hold as SCLC can initially improve quickly with initiation of chemo. Will continue to monitor  - Low threshold for abx for post obstructive pna, and infectious work up.   - OOB to chair, incentive miguel.   - Pulmonary will follow.
Agree with assessment and plan as above. Seems to be improving on systemic therapy. Will continue monitoring for central airway obstruction. IP team to follow.

## 2024-04-22 NOTE — PROGRESS NOTE ADULT - SUBJECTIVE AND OBJECTIVE BOX
CHIEF COMPLAINT:    Interval Events:    REVIEW OF SYSTEMS:  Constitutional: [ ] negative [ ] fevers [ ] chills [ ] weight loss [ ] weight gain  HEENT: [ ] negative [ ] dry eyes [ ] eye irritation [ ] postnasal drip [ ] nasal congestion  CV: [ ] negative  [ ] chest pain [ ] orthopnea [ ] palpitations [ ] murmur  Resp: [ ] negative [ ] cough [ ] shortness of breath [ ] dyspnea [ ] wheezing [ ] sputum [ ] hemoptysis  GI: [ ] negative [ ] nausea [ ] vomiting [ ] diarrhea [ ] constipation [ ] abd pain [ ] dysphagia   : [ ] negative [ ] dysuria [ ] nocturia [ ] hematuria [ ] increased urinary frequency  Musculoskeletal: [ ] negative [ ] back pain [ ] myalgias [ ] arthralgias [ ] fracture  Skin: [ ] negative [ ] rash [ ] itch  Neurological: [ ] negative [ ] headache [ ] dizziness [ ] syncope [ ] weakness [ ] numbness  Psychiatric: [ ] negative [ ] anxiety [ ] depression  Endocrine: [ ] negative [ ] diabetes [ ] thyroid problem  Hematologic/Lymphatic: [ ] negative [ ] anemia [ ] bleeding problem  Allergic/Immunologic: [ ] negative [ ] itchy eyes [ ] nasal discharge [ ] hives [ ] angioedema  [ ] All other systems negative  [ ] Unable to assess ROS because ________    OBJECTIVE:  ICU Vital Signs Last 24 Hrs  T(C): 37.3 (22 Apr 2024 14:11), Max: 37.3 (22 Apr 2024 14:11)  T(F): 99.1 (22 Apr 2024 14:11), Max: 99.1 (22 Apr 2024 14:11)  HR: 94 (22 Apr 2024 16:32) (84 - 102)  BP: 99/64 (22 Apr 2024 14:11) (99/64 - 107/62)  BP(mean): 75 (22 Apr 2024 14:11) (75 - 79)  ABP: --  ABP(mean): --  RR: 18 (22 Apr 2024 14:11) (16 - 18)  SpO2: 95% (22 Apr 2024 16:32) (94% - 100%)    O2 Parameters below as of 22 Apr 2024 14:11  Patient On (Oxygen Delivery Method): nasal cannula  O2 Flow (L/min): 2            04-21 @ 07:01  -  04-22 @ 07:00  --------------------------------------------------------  IN: 1850 mL / OUT: 1075 mL / NET: 775 mL    04-22 @ 07:01 - 04-22 @ 19:54  --------------------------------------------------------  IN: 0 mL / OUT: 300 mL / NET: -300 mL      CAPILLARY BLOOD GLUCOSE          PHYSICAL EXAM:  General:   HEENT:   Lymph Nodes:  Neck:   Respiratory:   Cardiovascular:   Abdomen:   Extremities:   Skin:   Neurological:  Psychiatry:    HOSPITAL MEDICATIONS:  MEDICATIONS  (STANDING):  albuterol/ipratropium for Nebulization 3 milliLiter(s) Nebulizer every 6 hours  allopurinol 300 milliGRAM(s) Oral daily  ascorbic acid 500 milliGRAM(s) Oral daily  chlorhexidine 2% Cloths 1 Application(s) Topical daily  enoxaparin Injectable 40 milliGRAM(s) SubCutaneous every 24 hours  ferrous    sulfate 325 milliGRAM(s) Oral daily  filgrastim-sndz (ZARXIO) Injectable 480 MICROGram(s) SubCutaneous daily  fluticasone propionate 50 MICROgram(s)/spray Nasal Spray 1 Spray(s) Both Nostrils two times a day  influenza   Vaccine 0.5 milliLiter(s) IntraMuscular once  loratadine 10 milliGRAM(s) Oral daily  melatonin 6 milliGRAM(s) Oral at bedtime  multivitamin 1 Tablet(s) Oral daily  polyethylene glycol 3350 17 Gram(s) Oral daily  senna 2 Tablet(s) Oral at bedtime    MEDICATIONS  (PRN):  acetaminophen     Tablet .. 650 milliGRAM(s) Oral every 6 hours PRN Temp greater or equal to 38C (100.4F), Mild Pain (1 - 3)  ALPRAZolam 0.125 milliGRAM(s) Oral two times a day PRN anxiety  bisacodyl 5 milliGRAM(s) Oral every 12 hours PRN Constipation  diphenhydrAMINE Injectable 50 milliGRAM(s) IV Push once PRN REACTION  guaiFENesin Oral Liquid (Sugar-Free) 100 milliGRAM(s) Oral every 6 hours PRN Cough  hydrocortisone sodium succinate Injectable 50 milliGRAM(s) IV Push once PRN REACTION  ondansetron Injectable 4 milliGRAM(s) IV Push every 8 hours PRN Nausea and/or Vomiting  oxycodone    5 mG/acetaminophen 325 mG 1 Tablet(s) Oral every 4 hours PRN Moderate Pain to Severe Pain (4-10)      LABS:                        11.5   5.44  )-----------( 239      ( 22 Apr 2024 06:35 )             33.6     Hgb Trend: 11.5<--, 11.6<--, 12.1<--, 12.5<--, 11.8<--  04-22    137  |  100  |  10  ----------------------------<  91  4.0   |  25  |  0.66    Ca    9.0      22 Apr 2024 06:35  Phos  3.3     04-22  Mg     2.00     04-22    TPro  6.3  /  Alb  3.6  /  TBili  0.6  /  DBili  x   /  AST  26  /  ALT  23  /  AlkPhos  67  04-22    Creatinine Trend: 0.66<--, 0.78<--, 0.68<--, 0.79<--, 0.82<--, 0.78<--    Urinalysis Basic - ( 22 Apr 2024 06:35 )    Color: x / Appearance: x / SG: x / pH: x  Gluc: 91 mg/dL / Ketone: x  / Bili: x / Urobili: x   Blood: x / Protein: x / Nitrite: x   Leuk Esterase: x / RBC: x / WBC x   Sq Epi: x / Non Sq Epi: x / Bacteria: x            MICROBIOLOGY:       RADIOLOGY:  [ ] Reviewed and interpreted by me    PULMONARY FUNCTION TESTS:    EKG: CHIEF COMPLAINT: SOB    Interval Events: S/p carboplatin D1 + etoposide D1-3 4/19-4/21 He reports feeling improved    REVIEW OF SYSTEMS:  Constitutional: [ ] negative [ ] fevers [ ] chills [ ] weight loss [ ] weight gain  HEENT: [ ] negative [ ] dry eyes [ ] eye irritation [ ] postnasal drip [ ] nasal congestion  CV: [ ] negative  [ ] chest pain [ ] orthopnea [ ] palpitations [ ] murmur  Resp: [ ] negative [ ] cough [improving ] shortness of breath [ ] dyspnea [ ] wheezing [ ] sputum [ ] hemoptysis  GI: [ ] negative [ ] nausea [ ] vomiting [ ] diarrhea [ ] constipation [ ] abd pain [ ] dysphagia   : [ ] negative [ ] dysuria [ ] nocturia [ ] hematuria [ ] increased urinary frequency  Musculoskeletal: [ ] negative [ ] back pain [ ] myalgias [ ] arthralgias [ ] fracture  Skin: [ ] negative [ ] rash [ ] itch  Neurological: [ ] negative [ ] headache [ ] dizziness [ ] syncope [ ] weakness [ ] numbness  Psychiatric: [ ] negative [ ] anxiety [ ] depression  Endocrine: [ ] negative [ ] diabetes [ ] thyroid problem  Hematologic/Lymphatic: [ ] negative [ ] anemia [ ] bleeding problem  Allergic/Immunologic: [ ] negative [ ] itchy eyes [ ] nasal discharge [ ] hives [ ] angioedema  [x ] All other systems negative  [ ] Unable to assess ROS because ________    OBJECTIVE:  ICU Vital Signs Last 24 Hrs  T(C): 37.3 (22 Apr 2024 14:11), Max: 37.3 (22 Apr 2024 14:11)  T(F): 99.1 (22 Apr 2024 14:11), Max: 99.1 (22 Apr 2024 14:11)  HR: 94 (22 Apr 2024 16:32) (84 - 102)  BP: 99/64 (22 Apr 2024 14:11) (99/64 - 107/62)  BP(mean): 75 (22 Apr 2024 14:11) (75 - 79)  ABP: --  ABP(mean): --  RR: 18 (22 Apr 2024 14:11) (16 - 18)  SpO2: 95% (22 Apr 2024 16:32) (94% - 100%)    O2 Parameters below as of 22 Apr 2024 14:11  Patient On (Oxygen Delivery Method): nasal cannula  O2 Flow (L/min): 2            04-21 @ 07:01 - 04-22 @ 07:00  --------------------------------------------------------  IN: 1850 mL / OUT: 1075 mL / NET: 775 mL    04-22 @ 07:01 - 04-22 @ 19:54  --------------------------------------------------------  IN: 0 mL / OUT: 300 mL / NET: -300 mL      CAPILLARY BLOOD GLUCOSE        PHYSICAL EXAM:  General: Male sitting in bed in no acute distress  HEENT: Nasal canula in place  Respiratory: CTA bilaterally  Cardiovascular: Regular rate and rhythm no m/r/g  Abdomen: Nontender nondistended  Extremities: No peripheral edema  Skin: No rashes  Neurological: No appreciable neurologic deficits  Psychiatry: Appropriate mood and affect    HOSPITAL MEDICATIONS:  MEDICATIONS  (STANDING):  albuterol/ipratropium for Nebulization 3 milliLiter(s) Nebulizer every 6 hours  allopurinol 300 milliGRAM(s) Oral daily  ascorbic acid 500 milliGRAM(s) Oral daily  chlorhexidine 2% Cloths 1 Application(s) Topical daily  enoxaparin Injectable 40 milliGRAM(s) SubCutaneous every 24 hours  ferrous    sulfate 325 milliGRAM(s) Oral daily  filgrastim-sndz (ZARXIO) Injectable 480 MICROGram(s) SubCutaneous daily  fluticasone propionate 50 MICROgram(s)/spray Nasal Spray 1 Spray(s) Both Nostrils two times a day  influenza   Vaccine 0.5 milliLiter(s) IntraMuscular once  loratadine 10 milliGRAM(s) Oral daily  melatonin 6 milliGRAM(s) Oral at bedtime  multivitamin 1 Tablet(s) Oral daily  polyethylene glycol 3350 17 Gram(s) Oral daily  senna 2 Tablet(s) Oral at bedtime    MEDICATIONS  (PRN):  acetaminophen     Tablet .. 650 milliGRAM(s) Oral every 6 hours PRN Temp greater or equal to 38C (100.4F), Mild Pain (1 - 3)  ALPRAZolam 0.125 milliGRAM(s) Oral two times a day PRN anxiety  bisacodyl 5 milliGRAM(s) Oral every 12 hours PRN Constipation  diphenhydrAMINE Injectable 50 milliGRAM(s) IV Push once PRN REACTION  guaiFENesin Oral Liquid (Sugar-Free) 100 milliGRAM(s) Oral every 6 hours PRN Cough  hydrocortisone sodium succinate Injectable 50 milliGRAM(s) IV Push once PRN REACTION  ondansetron Injectable 4 milliGRAM(s) IV Push every 8 hours PRN Nausea and/or Vomiting  oxycodone    5 mG/acetaminophen 325 mG 1 Tablet(s) Oral every 4 hours PRN Moderate Pain to Severe Pain (4-10)      LABS:                        11.5   5.44  )-----------( 239      ( 22 Apr 2024 06:35 )             33.6     Hgb Trend: 11.5<--, 11.6<--, 12.1<--, 12.5<--, 11.8<--  04-22    137  |  100  |  10  ----------------------------<  91  4.0   |  25  |  0.66    Ca    9.0      22 Apr 2024 06:35  Phos  3.3     04-22  Mg     2.00     04-22    TPro  6.3  /  Alb  3.6  /  TBili  0.6  /  DBili  x   /  AST  26  /  ALT  23  /  AlkPhos  67  04-22    Creatinine Trend: 0.66<--, 0.78<--, 0.68<--, 0.79<--, 0.82<--, 0.78<--    Urinalysis Basic - ( 22 Apr 2024 06:35 )    Color: x / Appearance: x / SG: x / pH: x  Gluc: 91 mg/dL / Ketone: x  / Bili: x / Urobili: x   Blood: x / Protein: x / Nitrite: x   Leuk Esterase: x / RBC: x / WBC x   Sq Epi: x / Non Sq Epi: x / Bacteria: x            MICROBIOLOGY:       RADIOLOGY:  [ ] Reviewed and interpreted by me    PULMONARY FUNCTION TESTS:    EKG:

## 2024-04-22 NOTE — CHART NOTE - NSCHARTNOTESELECT_GEN_ALL_CORE
Event Note
Thoracic Surgery/Event Note
Thoracic surgery
Event Note
Heme/Onc
Heme/Onc/Off Service Note
Interventional Pulmonary/Event Note
O2/Event Note

## 2024-04-22 NOTE — PROGRESS NOTE ADULT - ASSESSMENT
52 yo M current smoker with PMH small cell lung cancaer (newly diagnosed) who presents with 3-4 days of shortness of breath and LANDRUM. CT chest showed 5.3 x 5.1 cm right paramediastinal mass with extension into mediastinum and RUL obstruction. Interventional pulmonology consulted for possible airway stent.     # R paramediastinal mass  # RUL bronchus compression  Patient with recent diagnosis of SCLC at [facility] [place]. Has not yet started treatment. Presenting with progressive shortness of breath and found to have paramediastinal mass with RUL airway compression.  - initially was planned for OR for rigid bronchoscopy, tumor debulking, stent placement -- however, discussed with oncology and patient. He is planned to start treatment and patient wants to wait for treatment response. If treatment is being planned inpatient, we can monitor clinically and pursue stent placement if limited or no response noted. Please note that if radiation pursued, and airway continues to be narrow, recommend stent placement prior to RT. Ok to proceed with chemotherapy. Will wait to assess treatment response prior to proceeding with stent placement.   - S/p carbo and etop 4/18.   - low threshold for pan cx, starting abx if sputum, fevers, phlegm, cough etc worsening.  - appreciate heme/onc involvement   - patient will require home oxygen. On RA with spo2 of 86%. Recommend 2L at rest and 4L w/ ambulation.     Dr. Chandler Hernandez, DO  Pulmonary and Critical Care Medicine Fellow   Available via Microsoft Teams - **Preferred**  Pulmonary [facility] #[id number] ([facility]) / [phone] ([facility])  Pager:  [phone]

## 2024-04-22 NOTE — PROGRESS NOTE ADULT - SUBJECTIVE AND OBJECTIVE BOX
Hospitalist Progress Note  Tala Nam MD Pager # [phone]    OVERNIGHT EVENTS: LAURIE    SUBJECTIVE / INTERVAL HPI: Patient seen and examined at bedside. Patient reports that he is feeling comfortable on NC. He denies shortness of breath. No pain or discomfort. He has a good appetite. He is having bowel movements.     VITAL SIGNS:  Vital Signs Last 24 Hrs  T(C): 37 (22 Apr 2024 10:00), Max: 37.2 (21 Apr 2024 21:27)  T(F): 98.6 (22 Apr 2024 10:00), Max: 98.9 (21 Apr 2024 21:27)  HR: 98 (22 Apr 2024 10:00) (84 - 102)  BP: 102/67 (22 Apr 2024 10:00) (96/68 - 107/62)  BP(mean): 79 (22 Apr 2024 10:00) (79 - 79)  RR: 16 (22 Apr 2024 11:27) (16 - 18)  SpO2: 97% (22 Apr 2024 11:27) (94% - 100%)    Parameters below as of 22 Apr 2024 11:27  Patient On (Oxygen Delivery Method): nasal cannula w/ humidification  O2 Flow (L/min): 5      PHYSICAL EXAM:    General: Comfortable and resting in bed - on NC   HEENT: NC/AT; PERRL, anicteric sclera; MMM  Neck: supple  Cardiovascular: +S1/S2; RRR  Respiratory: CTA B/L; no W/R/R  Gastrointestinal: soft, NT/ND; +BSx4  Extremities: WWP; no edema, clubbing or cyanosis  Vascular: 2+ radial, DP/PT pulses B/L  Neurological: AAOx3; no focal deficits    MEDICATIONS:  MEDICATIONS  (STANDING):  albuterol/ipratropium for Nebulization 3 milliLiter(s) Nebulizer every 6 hours  allopurinol 300 milliGRAM(s) Oral daily  ascorbic acid 500 milliGRAM(s) Oral daily  chlorhexidine 2% Cloths 1 Application(s) Topical daily  enoxaparin Injectable 40 milliGRAM(s) SubCutaneous every 24 hours  ferrous    sulfate 325 milliGRAM(s) Oral daily  filgrastim-sndz (ZARXIO) Injectable 480 MICROGram(s) SubCutaneous daily  fluticasone propionate 50 MICROgram(s)/spray Nasal Spray 1 Spray(s) Both Nostrils two times a day  influenza   Vaccine 0.5 milliLiter(s) IntraMuscular once  loratadine 10 milliGRAM(s) Oral daily  melatonin 6 milliGRAM(s) Oral at bedtime  multivitamin 1 Tablet(s) Oral daily  polyethylene glycol 3350 17 Gram(s) Oral daily  senna 2 Tablet(s) Oral at bedtime    MEDICATIONS  (PRN):  acetaminophen     Tablet .. 650 milliGRAM(s) Oral every 6 hours PRN Temp greater or equal to 38C (100.4F), Mild Pain (1 - 3)  ALPRAZolam 0.125 milliGRAM(s) Oral two times a day PRN anxiety  bisacodyl 5 milliGRAM(s) Oral every 12 hours PRN Constipation  diphenhydrAMINE Injectable 50 milliGRAM(s) IV Push once PRN REACTION  guaiFENesin Oral Liquid (Sugar-Free) 100 milliGRAM(s) Oral every 6 hours PRN Cough  hydrocortisone sodium succinate Injectable 50 milliGRAM(s) IV Push once PRN REACTION  ondansetron Injectable 4 milliGRAM(s) IV Push every 8 hours PRN Nausea and/or Vomiting  oxycodone    5 mG/acetaminophen 325 mG 1 Tablet(s) Oral every 4 hours PRN Moderate Pain to Severe Pain (4-10)      ALLERGIES:  Allergies    No Known Allergies    Intolerances        LABS:                        11.5   5.44  )-----------( 239      ( 22 Apr 2024 06:35 )             33.6     04-22    137  |  100  |  10  ----------------------------<  91  4.0   |  25  |  0.66    Ca    9.0      22 Apr 2024 06:35  Phos  3.3     04-22  Mg     2.00     04-22    TPro  6.3  /  Alb  3.6  /  TBili  0.6  /  DBili  x   /  AST  26  /  ALT  23  /  AlkPhos  67  04-22      Urinalysis Basic - ( 22 Apr 2024 06:35 )    Color: x / Appearance: x / SG: x / pH: x  Gluc: 91 mg/dL / Ketone: x  / Bili: x / Urobili: x   Blood: x / Protein: x / Nitrite: x   Leuk Esterase: x / RBC: x / WBC x   Sq Epi: x / Non Sq Epi: x / Bacteria: x      CAPILLARY BLOOD GLUCOSE          RADIOLOGY & ADDITIONAL TESTS: Reviewed.    ASSESSMENT:    PLAN:

## 2024-04-22 NOTE — PROGRESS NOTE ADULT - ASSESSMENT
This is a 53 year old male with extensive-stage small cell lung cancer who presents with worsening shortness of breath.    1. ES-SCLC   -- Diagnosed via biopsy of L neck mass on 03/2024  -- PET/CT showed enlarged cervical and thoracic LAD, minimally FDG-avid RUL nodule  -- Outpatient he had declined chemoimmunotherapy and wanted to pursue homeopathic treatments, however now amenable to systemic therapy.  -- CTA chest shows 5.3 x 5.1 cm R paramediastinal mass, increased soft tissue density in mediastinum and sreekanth causing obstruction of RUL bronchus and significant narrowing of right mainstem bronchus   -- Continue with carboplatin D1 + etoposide D1-3 4/19-4/21. Plan for immunotherapy as outpatient.   --Zarxio - 480mcg daily x 7 days  --uric acid 3.5, LDH trending down, G6PD still pending  -- Rec IV hydration prior to chemotherapy each day, and check TLS labs daily.   -- Plans for herbal medicine in conjunction with chemotherapy. Advised to not take currently and will need to arrange nutrition follow up outpatient to assess if any reactions with chemoIO.  -- Of note MRI Brain not completed as from an accident as metal that is not compatible. He did have a CT Brain with contrast a few weeks prior which did not show metastasis  -- After discharge, follow up with [place]. Bowen Avalos [place] [facility] [place].    2. Shortness of breath   -- CTA as above   -- IR consulted, bronchoscopy w stent placement cancelled as pt will receive chemotherapy  -- Rad onc consulted, no plan for RT   -- On  supplemental O2 6 Liters, 02 sat 100%    Will continue to follow.    Tierra Jim NP  Hematology/Oncology  [facility]  [phone] (Office)  [phone] (Alt office)  Evenings and weekends please call MD on call or office

## 2024-04-22 NOTE — PROGRESS NOTE ADULT - NS ATTEND AMEND GEN_ALL_CORE FT
Agree with above assessment and plan.  53 year old male with extensive-stage small cell lung cancer who presents with worsening shortness of breath. Imaging with enlarged cervical, thoracic LAD and minimally FDG avid RUL nodule. Declined chemoIO initially and wanted to pursue homeopathic treatments which now given worsening symptoms ammenable to treatment. He received Carbo AUC 5, Etoposide 100mg/m2 4/19-4/21 with Zarxio 480mcg started today. He has claritin ordered as well. He was on high flow oxygen with significant SOB with ambulation to the restroom, now on 2L nc walking and standing, ambulating in hallway, has to increase to 4L NC when laying flat. His palpable right cervical suprclav mass has decreased in size. LDH trending down.   Of note MRI Brain not completed as from an accident as metal that is not compatible. He did have a CT Brain with contrast a few weeks prior which did not show metastasis. After discharge[place] [facility] [place]  Will need Zarxio teaching and script to go home with.

## 2024-04-22 NOTE — PROGRESS NOTE ADULT - SUBJECTIVE AND OBJECTIVE BOX
Patient is a 53y old  Male who presents with a chief complaint of SOB (22 Apr 2024 08:07)      MEDICATIONS  (STANDING):  albuterol/ipratropium for Nebulization 3 milliLiter(s) Nebulizer every 6 hours  allopurinol 300 milliGRAM(s) Oral daily  ascorbic acid 500 milliGRAM(s) Oral daily  chlorhexidine 2% Cloths 1 Application(s) Topical daily  enoxaparin Injectable 40 milliGRAM(s) SubCutaneous every 24 hours  ferrous    sulfate 325 milliGRAM(s) Oral daily  fluticasone propionate 50 MICROgram(s)/spray Nasal Spray 1 Spray(s) Both Nostrils two times a day  influenza   Vaccine 0.5 milliLiter(s) IntraMuscular once  loratadine 10 milliGRAM(s) Oral daily  melatonin 6 milliGRAM(s) Oral at bedtime  multivitamin 1 Tablet(s) Oral daily  polyethylene glycol 3350 17 Gram(s) Oral daily  senna 2 Tablet(s) Oral at bedtime    MEDICATIONS  (PRN):  acetaminophen     Tablet .. 650 milliGRAM(s) Oral every 6 hours PRN Temp greater or equal to 38C (100.4F), Mild Pain (1 - 3)  ALPRAZolam 0.125 milliGRAM(s) Oral two times a day PRN anxiety  bisacodyl 5 milliGRAM(s) Oral every 12 hours PRN Constipation  diphenhydrAMINE Injectable 50 milliGRAM(s) IV Push once PRN REACTION  guaiFENesin Oral Liquid (Sugar-Free) 100 milliGRAM(s) Oral every 6 hours PRN Cough  hydrocortisone sodium succinate Injectable 50 milliGRAM(s) IV Push once PRN REACTION  ondansetron Injectable 4 milliGRAM(s) IV Push every 8 hours PRN Nausea and/or Vomiting  oxycodone    5 mG/acetaminophen 325 mG 1 Tablet(s) Oral every 4 hours PRN Moderate Pain to Severe Pain (4-10)      ROS  No fever, sweats, chills  No epistaxis, HA, sore throat  No CP, SOB, cough, sputum  No n/v/d, abd pain, melena, hematochezia  No edema  No rash  No anxiety  No back pain, joint pain  No bleeding, bruising  No dysuria, hematuria    Vital Signs Last 24 Hrs  T(C): 37 (22 Apr 2024 10:00), Max: 37.3 (21 Apr 2024 12:00)  T(F): 98.6 (22 Apr 2024 10:00), Max: 99.1 (21 Apr 2024 12:00)  HR: 98 (22 Apr 2024 10:00) (84 - 102)  BP: 102/67 (22 Apr 2024 10:00) (96/68 - 107/62)  BP(mean): 79 (22 Apr 2024 10:00) (79 - 79)  RR: 18 (22 Apr 2024 10:00) (15 - 18)  SpO2: 100% (22 Apr 2024 10:00) (93% - 100%)    Parameters below as of 22 Apr 2024 10:00  Patient On (Oxygen Delivery Method): nasal cannula        PE  NAD, A&Ox3  Anicteric, MMM  RRR  CTAB  Abd soft, NT, ND  No c/c/e  No rash grossly                            11.5   5.44  )-----------( 239      ( 22 Apr 2024 06:35 )             33.6       04-22    137  |  100  |  10  ----------------------------<  91  4.0   |  25  |  0.66    Ca    9.0      22 Apr 2024 06:35  Phos  3.3     04-22  Mg     2.00     04-22    TPro  6.3  /  Alb  3.6  /  TBili  0.6  /  DBili  x   /  AST  26  /  ALT  23  /  AlkPhos  67  04-22

## 2024-04-23 ENCOUNTER — TRANSCRIPTION ENCOUNTER (OUTPATIENT)
Age: 54
End: 2024-04-23

## 2024-04-23 VITALS
HEART RATE: 98 BPM | SYSTOLIC BLOOD PRESSURE: 106 MMHG | DIASTOLIC BLOOD PRESSURE: 72 MMHG | TEMPERATURE: 98 F | OXYGEN SATURATION: 100 % | RESPIRATION RATE: 18 BRPM

## 2024-04-23 PROBLEM — C34.90 MALIGNANT NEOPLASM OF UNSPECIFIED PART OF UNSPECIFIED BRONCHUS OR LUNG: Chronic | Status: ACTIVE | Noted: 2024-04-15

## 2024-04-23 LAB
ALBUMIN SERPL ELPH-MCNC: 3.4 G/DL — SIGNIFICANT CHANGE UP (ref 3.3–5)
ALP SERPL-CCNC: 71 U/L — SIGNIFICANT CHANGE UP (ref 40–120)
ALT FLD-CCNC: 27 U/L — SIGNIFICANT CHANGE UP (ref 4–41)
ANION GAP SERPL CALC-SCNC: 10 MMOL/L — SIGNIFICANT CHANGE UP (ref 7–14)
ANISOCYTOSIS BLD QL: SLIGHT — SIGNIFICANT CHANGE UP
AST SERPL-CCNC: 30 U/L — SIGNIFICANT CHANGE UP (ref 4–40)
BASOPHILS # BLD AUTO: 0 K/UL — SIGNIFICANT CHANGE UP (ref 0–0.2)
BASOPHILS NFR BLD AUTO: 0 % — SIGNIFICANT CHANGE UP (ref 0–2)
BILIRUB SERPL-MCNC: 0.5 MG/DL — SIGNIFICANT CHANGE UP (ref 0.2–1.2)
BUN SERPL-MCNC: 10 MG/DL — SIGNIFICANT CHANGE UP (ref 7–23)
CALCIUM SERPL-MCNC: 9 MG/DL — SIGNIFICANT CHANGE UP (ref 8.4–10.5)
CHLORIDE SERPL-SCNC: 101 MMOL/L — SIGNIFICANT CHANGE UP (ref 98–107)
CO2 SERPL-SCNC: 26 MMOL/L — SIGNIFICANT CHANGE UP (ref 22–31)
CREAT SERPL-MCNC: 0.61 MG/DL — SIGNIFICANT CHANGE UP (ref 0.5–1.3)
EGFR: 116 ML/MIN/1.73M2 — SIGNIFICANT CHANGE UP
EOSINOPHIL # BLD AUTO: 0 K/UL — SIGNIFICANT CHANGE UP (ref 0–0.5)
EOSINOPHIL NFR BLD AUTO: 0 % — SIGNIFICANT CHANGE UP (ref 0–6)
GIANT PLATELETS BLD QL SMEAR: PRESENT — SIGNIFICANT CHANGE UP
GLUCOSE SERPL-MCNC: 91 MG/DL — SIGNIFICANT CHANGE UP (ref 70–99)
HCT VFR BLD CALC: 32.3 % — LOW (ref 39–50)
HGB BLD-MCNC: 10.8 G/DL — LOW (ref 13–17)
IANC: 19.62 K/UL — HIGH (ref 1.8–7.4)
LDH SERPL L TO P-CCNC: 273 U/L — HIGH (ref 135–225)
LYMPHOCYTES # BLD AUTO: 0.2 K/UL — LOW (ref 1–3.3)
LYMPHOCYTES # BLD AUTO: 0.9 % — LOW (ref 13–44)
MACROCYTES BLD QL: SIGNIFICANT CHANGE UP
MAGNESIUM SERPL-MCNC: 1.9 MG/DL — SIGNIFICANT CHANGE UP (ref 1.6–2.6)
MANUAL SMEAR VERIFICATION: SIGNIFICANT CHANGE UP
MCHC RBC-ENTMCNC: 29.5 PG — SIGNIFICANT CHANGE UP (ref 27–34)
MCHC RBC-ENTMCNC: 33.4 GM/DL — SIGNIFICANT CHANGE UP (ref 32–36)
MCV RBC AUTO: 88.3 FL — SIGNIFICANT CHANGE UP (ref 80–100)
MICROCYTES BLD QL: SIGNIFICANT CHANGE UP
MONOCYTES # BLD AUTO: 0.2 K/UL — SIGNIFICANT CHANGE UP (ref 0–0.9)
MONOCYTES NFR BLD AUTO: 0.9 % — LOW (ref 2–14)
NEUTROPHILS # BLD AUTO: 20.93 K/UL — HIGH (ref 1.8–7.4)
NEUTROPHILS NFR BLD AUTO: 84.3 % — HIGH (ref 43–77)
NEUTS BAND # BLD: 11.3 % — CRITICAL HIGH (ref 0–6)
OVALOCYTES BLD QL SMEAR: SLIGHT — SIGNIFICANT CHANGE UP
PHOSPHATE SERPL-MCNC: 3.8 MG/DL — SIGNIFICANT CHANGE UP (ref 2.5–4.5)
PLAT MORPH BLD: NORMAL — SIGNIFICANT CHANGE UP
PLATELET # BLD AUTO: 207 K/UL — SIGNIFICANT CHANGE UP (ref 150–400)
PLATELET COUNT - ESTIMATE: NORMAL — SIGNIFICANT CHANGE UP
POIKILOCYTOSIS BLD QL AUTO: SLIGHT — SIGNIFICANT CHANGE UP
POLYCHROMASIA BLD QL SMEAR: SLIGHT — SIGNIFICANT CHANGE UP
POTASSIUM SERPL-MCNC: 4.1 MMOL/L — SIGNIFICANT CHANGE UP (ref 3.5–5.3)
POTASSIUM SERPL-SCNC: 4.1 MMOL/L — SIGNIFICANT CHANGE UP (ref 3.5–5.3)
PROT SERPL-MCNC: 6.1 G/DL — SIGNIFICANT CHANGE UP (ref 6–8.3)
RBC # BLD: 3.66 M/UL — LOW (ref 4.2–5.8)
RBC # FLD: 13.8 % — SIGNIFICANT CHANGE UP (ref 10.3–14.5)
RBC BLD AUTO: NORMAL — SIGNIFICANT CHANGE UP
SODIUM SERPL-SCNC: 137 MMOL/L — SIGNIFICANT CHANGE UP (ref 135–145)
SPHEROCYTES BLD QL SMEAR: SLIGHT — SIGNIFICANT CHANGE UP
URATE SERPL-MCNC: 3.3 MG/DL — LOW (ref 3.4–8.8)
VARIANT LYMPHS # BLD: 2.6 % — SIGNIFICANT CHANGE UP (ref 0–6)
WBC # BLD: 21.89 K/UL — HIGH (ref 3.8–10.5)
WBC # FLD AUTO: 21.89 K/UL — HIGH (ref 3.8–10.5)

## 2024-04-23 PROCEDURE — 99232 SBSQ HOSP IP/OBS MODERATE 35: CPT

## 2024-04-23 RX ORDER — LORATADINE 10 MG/1
1 TABLET ORAL
Qty: 30 | Refills: 0
Start: 2024-04-23 | End: 2024-05-22

## 2024-04-23 RX ORDER — FILGRASTIM 480MCG/1.6
480 VIAL (ML) INJECTION
Qty: 1 | Refills: 0
Start: 2024-04-23 | End: 2024-04-27

## 2024-04-23 RX ORDER — LANOLIN ALCOHOL/MO/W.PET/CERES
2 CREAM (GRAM) TOPICAL
Qty: 60 | Refills: 0
Start: 2024-04-23 | End: 2024-05-22

## 2024-04-23 RX ORDER — ALLOPURINOL 300 MG
1 TABLET ORAL
Qty: 30 | Refills: 0
Start: 2024-04-23 | End: 2024-05-22

## 2024-04-23 RX ORDER — FILGRASTIM 480MCG/1.6
480 VIAL (ML) INJECTION
Qty: 4 | Refills: 0
Start: 2024-04-23 | End: 2024-04-27

## 2024-04-23 RX ORDER — FLUTICASONE PROPIONATE 50 MCG
1 SPRAY, SUSPENSION NASAL
Qty: 1 | Refills: 0
Start: 2024-04-23 | End: 2024-05-22

## 2024-04-23 RX ORDER — POLYETHYLENE GLYCOL 3350 17 G/17G
17 POWDER, FOR SOLUTION ORAL
Qty: 510 | Refills: 0
Start: 2024-04-23 | End: 2024-05-22

## 2024-04-23 RX ORDER — FERROUS SULFATE 325(65) MG
1 TABLET ORAL
Qty: 30 | Refills: 0
Start: 2024-04-23 | End: 2024-05-22

## 2024-04-23 RX ORDER — ASCORBIC ACID 60 MG
1 TABLET,CHEWABLE ORAL
Qty: 30 | Refills: 0
Start: 2024-04-23 | End: 2024-05-22

## 2024-04-23 RX ADMIN — POLYETHYLENE GLYCOL 3350 17 GRAM(S): 17 POWDER, FOR SOLUTION ORAL at 12:20

## 2024-04-23 RX ADMIN — Medication 1 TABLET(S): at 12:20

## 2024-04-23 RX ADMIN — LORATADINE 10 MILLIGRAM(S): 10 TABLET ORAL at 12:19

## 2024-04-23 RX ADMIN — Medication 1 SPRAY(S): at 06:36

## 2024-04-23 RX ADMIN — Medication 480 MICROGRAM(S): at 15:13

## 2024-04-23 RX ADMIN — Medication 3 MILLILITER(S): at 09:54

## 2024-04-23 RX ADMIN — Medication 1 SPRAY(S): at 17:21

## 2024-04-23 RX ADMIN — CHLORHEXIDINE GLUCONATE 1 APPLICATION(S): 213 SOLUTION TOPICAL at 12:25

## 2024-04-23 RX ADMIN — Medication 300 MILLIGRAM(S): at 12:19

## 2024-04-23 RX ADMIN — Medication 325 MILLIGRAM(S): at 12:20

## 2024-04-23 NOTE — PROGRESS NOTE ADULT - PROBLEM SELECTOR PLAN 5
Lovenox ppx  PT eval --> OP PT  full code  care d/w wife at bedside    Dispo: home pending prior auth for Zarxio coverage outpatient.
DVT ppx enoxaparin 40 mg daily  PT eval --> Outpatient PT  FULL CODE    Discussed with Wife Angella at length at bedside today 4/17.
Lovenox ppx  PT eval --> OP PT  full code
lovenox ppx  PT eval --> OP PT  full code   family at bedside
Lovenox ppx  PT eval --> OP PT  full code  dispo pending IP chemo, coming off HFNC  care d/w wife at bedside
Lovenox ppx  PT eval --> OP PT  full code  dispo pending IP chemo, coming off HFNC  care d/w wife at bedside
DVT ppx enoxaparin 40 mg daily  PT eval --> Outpatient PT  FULL CODE    Discussed with Wife Angella at length at bedside today 4/18.
DVT ppx enoxaparin 40 mg daily  PT evaluation    Discussed with Wife Angella at bedside at length today 4/16.

## 2024-04-23 NOTE — PROGRESS NOTE ADULT - SUBJECTIVE AND OBJECTIVE BOX
Patient is a 53y old  Male who presents with a chief complaint of SOB (22 Apr 2024 19:53)      MEDICATIONS  (STANDING):  albuterol/ipratropium for Nebulization 3 milliLiter(s) Nebulizer every 6 hours  allopurinol 300 milliGRAM(s) Oral daily  ascorbic acid 500 milliGRAM(s) Oral daily  chlorhexidine 2% Cloths 1 Application(s) Topical daily  enoxaparin Injectable 40 milliGRAM(s) SubCutaneous every 24 hours  ferrous    sulfate 325 milliGRAM(s) Oral daily  filgrastim-sndz (ZARXIO) Injectable 480 MICROGram(s) SubCutaneous daily  fluticasone propionate 50 MICROgram(s)/spray Nasal Spray 1 Spray(s) Both Nostrils two times a day  influenza   Vaccine 0.5 milliLiter(s) IntraMuscular once  loratadine 10 milliGRAM(s) Oral daily  melatonin 6 milliGRAM(s) Oral at bedtime  multivitamin 1 Tablet(s) Oral daily  polyethylene glycol 3350 17 Gram(s) Oral daily  senna 2 Tablet(s) Oral at bedtime    MEDICATIONS  (PRN):  acetaminophen     Tablet .. 650 milliGRAM(s) Oral every 6 hours PRN Temp greater or equal to 38C (100.4F), Mild Pain (1 - 3)  ALPRAZolam 0.125 milliGRAM(s) Oral two times a day PRN anxiety  bisacodyl 5 milliGRAM(s) Oral every 12 hours PRN Constipation  diphenhydrAMINE Injectable 50 milliGRAM(s) IV Push once PRN REACTION  guaiFENesin Oral Liquid (Sugar-Free) 100 milliGRAM(s) Oral every 6 hours PRN Cough  hydrocortisone sodium succinate Injectable 50 milliGRAM(s) IV Push once PRN REACTION  ondansetron Injectable 4 milliGRAM(s) IV Push every 8 hours PRN Nausea and/or Vomiting  oxycodone    5 mG/acetaminophen 325 mG 1 Tablet(s) Oral every 4 hours PRN Moderate Pain to Severe Pain (4-10)      ROS  No fever, sweats, chills  No epistaxis, HA, sore throat  No CP, SOB, cough, sputum  No n/v/d, abd pain, melena, hematochezia  No edema  No rash  No anxiety  No back pain, joint pain  No bleeding, bruising  No dysuria, hematuria    Vital Signs Last 24 Hrs  T(C): 36.7 (23 Apr 2024 00:30), Max: 37.3 (22 Apr 2024 14:11)  T(F): 98 (23 Apr 2024 00:30), Max: 99.1 (22 Apr 2024 14:11)  HR: 81 (23 Apr 2024 00:30) (81 - 102)  BP: 100/52 (23 Apr 2024 00:30) (98/54 - 102/67)  BP(mean): 75 (22 Apr 2024 14:11) (75 - 79)  RR: 18 (23 Apr 2024 00:30) (16 - 18)  SpO2: 98% (23 Apr 2024 00:30) (95% - 100%)    Parameters below as of 23 Apr 2024 00:30  Patient On (Oxygen Delivery Method): nasal cannula  O2 Flow (L/min): 5      PE  NAD  Awake, alert  Anicteric, MMM  RRR, 02 sat 96% 2L NC  CTAB  Abd soft, NT, ND  No c/c/e  No rash grossly  FROM                          10.8   x     )-----------( 207      ( 23 Apr 2024 07:20 )             32.3       04-23    137  |  101  |  10  ----------------------------<  91  4.1   |  26  |  0.61    Ca    9.0      23 Apr 2024 07:20  Phos  3.8     04-23  Mg     1.90     04-23    TPro  6.1  /  Alb  3.4  /  TBili  0.5  /  DBili  x   /  AST  30  /  ALT  27  /  AlkPhos  71  04-23

## 2024-04-23 NOTE — PROGRESS NOTE ADULT - SUBJECTIVE AND OBJECTIVE BOX
Hospitalist Progress Note  Tala aNm MD Pager # [phone]    OVERNIGHT EVENTS: LAURIE    SUBJECTIVE / INTERVAL HPI: Patient seen and examined at bedside. Patient has no complaints. No shortness of breath. Comfortably breathing on NC. Wants to go home.     VITAL SIGNS:  Vital Signs Last 24 Hrs  T(C): 36.9 (23 Apr 2024 14:09), Max: 37.2 (22 Apr 2024 20:49)  T(F): 98.5 (23 Apr 2024 14:09), Max: 98.9 (22 Apr 2024 20:49)  HR: 98 (23 Apr 2024 14:09) (81 - 100)  BP: 106/72 (23 Apr 2024 14:09) (98/54 - 106/72)  BP(mean): --  RR: 18 (23 Apr 2024 14:09) (17 - 18)  SpO2: 100% (23 Apr 2024 14:09) (95% - 100%)    Parameters below as of 23 Apr 2024 14:09  Patient On (Oxygen Delivery Method): nasal cannula  O2 Flow (L/min): 2      PHYSICAL EXAM:    General: Comfortable and resting in bed   HEENT: NC/AT; PERRL, anicteric sclera; MMM  Neck: supple  Cardiovascular: +S1/S2; RRR  Respiratory: CTA B/L; no W/R/R  Gastrointestinal: soft, NT/ND; +BSx4  Extremities: WWP; no edema, clubbing or cyanosis  Vascular: 2+ radial, DP/PT pulses B/L  Neurological: AAOx3; no focal deficits    MEDICATIONS:  MEDICATIONS  (STANDING):  albuterol/ipratropium for Nebulization 3 milliLiter(s) Nebulizer every 6 hours  allopurinol 300 milliGRAM(s) Oral daily  ascorbic acid 500 milliGRAM(s) Oral daily  chlorhexidine 2% Cloths 1 Application(s) Topical daily  enoxaparin Injectable 40 milliGRAM(s) SubCutaneous every 24 hours  ferrous    sulfate 325 milliGRAM(s) Oral daily  filgrastim-sndz (ZARXIO) Injectable 480 MICROGram(s) SubCutaneous daily  fluticasone propionate 50 MICROgram(s)/spray Nasal Spray 1 Spray(s) Both Nostrils two times a day  influenza   Vaccine 0.5 milliLiter(s) IntraMuscular once  loratadine 10 milliGRAM(s) Oral daily  melatonin 6 milliGRAM(s) Oral at bedtime  multivitamin 1 Tablet(s) Oral daily  polyethylene glycol 3350 17 Gram(s) Oral daily  senna 2 Tablet(s) Oral at bedtime    MEDICATIONS  (PRN):  acetaminophen     Tablet .. 650 milliGRAM(s) Oral every 6 hours PRN Temp greater or equal to 38C (100.4F), Mild Pain (1 - 3)  ALPRAZolam 0.125 milliGRAM(s) Oral two times a day PRN anxiety  bisacodyl 5 milliGRAM(s) Oral every 12 hours PRN Constipation  diphenhydrAMINE Injectable 50 milliGRAM(s) IV Push once PRN REACTION  guaiFENesin Oral Liquid (Sugar-Free) 100 milliGRAM(s) Oral every 6 hours PRN Cough  hydrocortisone sodium succinate Injectable 50 milliGRAM(s) IV Push once PRN REACTION  ondansetron Injectable 4 milliGRAM(s) IV Push every 8 hours PRN Nausea and/or Vomiting  oxycodone    5 mG/acetaminophen 325 mG 1 Tablet(s) Oral every 4 hours PRN Moderate Pain to Severe Pain (4-10)      ALLERGIES:  Allergies    No Known Allergies    Intolerances        LABS:                        10.8   21.89 )-----------( 207      ( 23 Apr 2024 07:20 )             32.3     04-23    137  |  101  |  10  ----------------------------<  91  4.1   |  26  |  0.61    Ca    9.0      23 Apr 2024 07:20  Phos  3.8     04-23  Mg     1.90     04-23    TPro  6.1  /  Alb  3.4  /  TBili  0.5  /  DBili  x   /  AST  30  /  ALT  27  /  AlkPhos  71  04-23      Urinalysis Basic - ( 23 Apr 2024 07:20 )    Color: x / Appearance: x / SG: x / pH: x  Gluc: 91 mg/dL / Ketone: x  / Bili: x / Urobili: x   Blood: x / Protein: x / Nitrite: x   Leuk Esterase: x / RBC: x / WBC x   Sq Epi: x / Non Sq Epi: x / Bacteria: x      CAPILLARY BLOOD GLUCOSE          RADIOLOGY & ADDITIONAL TESTS: Reviewed.    ASSESSMENT:    PLAN:

## 2024-04-23 NOTE — PROGRESS NOTE ADULT - PROBLEM SELECTOR PLAN 2
Patient with newly diagnosed SCLC with mets (seen at [facility] in [place]), here for 2nd opinion  - Pt [place] [facility] [place]  - Seen by [facility], marianne recs, extensive-stage small cell lung cancer  - Diagnosed via biopsy of R neck mass on 03/2024  - PET/CT showed enlarged cervical and thoracic LAD, minimally FDG-avid RUL nodule  - Outpatient he had declined chemoimmunotherapy and wanted to pursue homeopathic treatments  - D/w House On[facility], marianne recs and d/w them, plan to start inpatient chemo by [facility]  - Seen by Rad-Onc, marianne recs, no role for any palliative RT now without upfront chemo  - Inpatient chemo w/ carboplatin D1 + etoposide D1-3 starting 04/19  - F/up TLS labs daily, f/up G6PD and uric acid levels  - daily allopurinol  - Growth factor after chemo completed, neupogen started day after chemo complete (day 4) daily for 5 days
Patient with newly diagnosed SCLC with mets (seen at [facility] in [place])  - Pt underwent [place] [facility] [place]  - Seen by [facility], apprec recs, extensive-stage small cell lung cancer  - Diagnosed via biopsy of L neck mass on 03/2024  - PET/CT showed enlarged cervical and thoracic LAD, minimally FDG-avid RUL nodule  - Outpatient he had declined chemoimmunotherapy and wanted to pursue homeopathic treatments  - Patient expressed today to me that he is interested on chemotherapy  - Per Onc potential plan for inpatient treatment with carboplatin + etoposide + atezolizumab  - After discharge, follow up with Dr. Bowen Avalos at [facility]  - D/w House Onc, apprec recs and d/w them  - Seen by CTS as above, f/up further recs
Patient with newly diagnosed SCLC with mets [place] [facility] here for 2nd opinion  - s/p PET scan at [facility]  - Seen by [facility], marianne recs, extensive-stage small cell lung cancer  - diagnosed via biopsy of R neck mass on 03/2024  - PET/CT showed enlarged cervical and thoracic LAD, minimally FDG-avid RUL nodule  - OP declined chemoimmunotherapy and wanted to pursue homeopathic treatments  - d/w [facility] Onc, marianne recs and d/w them, plan to start inpatient chemo by [facility]  - Rad-Onc, apprec recs, no role for any palliative RT now without upfront chemo  - Inpatient chemo w/ carboplatin D1 + etoposide D1-3 starting 04/19 to end 4/21  - F/up TLS labs daily, f/up G6PD and uric acid levels  - daily allopurinol  - neupogen to start day after chemo complete (day 4) daily for 5 days--onc to order
Patient with newly diagnosed SCLC with mets (seen at [facility] [place] [facility] in [place])  - Pt underwent PET scan at [facility] [place]  - Seen by [facility], dorcasrec recs, extensive-stage small cell lung cancer  - Diagnosed via biopsy of R neck mass on 03/2024  - PET/CT showed enlarged cervical and thoracic LAD, minimally FDG-avid RUL nodule  - Outpatient he had declined chemoimmunotherapy and wanted to pursue homeopathic treatments  - D/w [facility] On[facility], apprec recs and d/w them, [facility]  - Chemo options mainly with carbo and etoposide  - Outpatient f/up will likely be with [facility]  - Seen by Rad-Onc today and d/w them, apprec recs, no role for any palliative RT now without upfront chemo
Patient with newly diagnosed SCLC with mets [place] [facility] [place] here for 2nd opinion  - [place] [facility] [place]  - Seen by [facility], marianne recs, extensive-stage small cell lung cancer  - diagnosed via biopsy of R neck mass on 03/2024  - PET/CT showed enlarged cervical and thoracic LAD, minimally FDG-avid RUL nodule  - OP declined chemoimmunotherapy and wanted to pursue homeopathic treatments  - d/w House Onc, marianne recs and d/w them, plan to start inpatient chemo by [facility]  - Rad-Onc, apprec recs, no role for any palliative RT now without upfront chemo  - Inpatient chemo w/ carboplatin D1 + etoposide D1-3 starting 04/19 to end 4/21  - F/up TLS labs daily, f/up G6PD and uric acid levels  - daily allopurinol  - neupogen to start day after chemo complete (day 4) daily for 5 days--onc to order
Patient with newly diagnosed SCLC with mets (seen at [facility] in [place]), here for 2nd opinion  - s/p PET scan at [facility] [place]  - Seen by [facility], marianne recs, extensive-stage small cell lung cancer  - diagnosed via biopsy of R neck mass on 03/2024  - PET/CT showed enlarged cervical and thoracic LAD, minimally FDG-avid RUL nodule  - OP declined chemoimmunotherapy and wanted to pursue homeopathic treatments  - d/w [facility] Onc, marianne recs and d/w them, plan to start inpatient chemo by [facility]  - Rad-Onc, apprec recs, no role for any palliative RT now without upfront chemo  - Inpatient chemo w/ carboplatin D1 + etoposide D1-3 starting 04/19 to end 4/21  - F/up TLS labs daily, f/up G6PD and uric acid levels  - daily allopurinol  - neupogen to start day after chemo complete (day 4) daily for 5 days--onc to order
Patient with newly diagnosed SCLC with mets (seen at [facility] in [place]), here for 2nd opinion  - Pt underwent PET scan at [facility] [place]  - Seen by [facility], marianne recs, extensive-stage small cell lung cancer  - diagnosed via biopsy of R neck mass on 03/2024  - PET/CT showed enlarged cervical and thoracic LAD, minimally FDG-avid RUL nodule  - Outpatient he had declined chemoimmunotherapy and wanted to pursue homeopathic treatments  - D/w [facility] Onc, marianne recs and d/w them, plan to start inpatient chemo by [facility]  - Seen by Rad-Onc, marianne recs, no role for any palliative RT now without upfront chemo  - Inpatient chemo w/ carboplatin D1 + etoposide D1-3 starting 04/19 to end 4/21  - F/up TLS labs daily, f/up G6PD and uric acid levels  - daily allopurinol  - neupogen to start day after chemo complete (day 4) daily for 5 days
Patient with newly diagnosed SCLC with mets (seen at [facility] in [place]), here for 2nd opinion  - Pt [place] [facility] [place]  - Seen by [facility], marianne recs, extensive-stage small cell lung cancer  - Diagnosed via biopsy of R neck mass on 03/2024  - PET/CT showed enlarged cervical and thoracic LAD, minimally FDG-avid RUL nodule  - Outpatient he had declined chemoimmunotherapy and wanted to pursue homeopathic treatments  - D/w [facility], marianne recs and d/w them, [facility]  - Outpatient f/up will likely be with [facility]  - Seen by Rad-Onc, marianne recs, no role for any palliative RT now without upfront chemo  - Starting chemo tmrw, IV hydration today, apprec Onc recs  - Inpatient chemo w/ carboplatin D1 + etoposide D1-3 starting 04/19  - F/up TLS labs daily, f/up G6PD and uric acid levels  - Starting daily allopurinol  - Growth factor after chemo completed, neupogen started day after chemo complete (day 4) daily for 5 days

## 2024-04-23 NOTE — PROGRESS NOTE ADULT - ASSESSMENT
This is a 53 year old male with extensive-stage small cell lung cancer who presents with worsening shortness of breath.    1. ES-SCLC   -- Diagnosed via biopsy of L neck mass on 03/2024  -- PET/CT showed enlarged cervical and thoracic LAD, minimally FDG-avid RUL nodule  -- Outpatient he had declined chemoimmunotherapy and wanted to pursue homeopathic treatments, however now amenable to systemic therapy.  -- CTA chest shows 5.3 x 5.1 cm R paramediastinal mass, increased soft tissue density in mediastinum and sreekanth causing obstruction of RUL bronchus and significant narrowing of right mainstem bronchus   -- Continue with carboplatin D1 + etoposide D1-3 4/19-4/21. Plan for immunotherapy as outpatient.   --Zarxio - 480mcg daily x 7 days, received 1st dose 4/22, denies bone pain  --uric acid 3.3, LDH trending down, G6PD nml  --  TLS labs daily.   -- Of note MRI Brain not completed as from an accident as metal that is not compatible. He did have a CT Brain with contrast a few weeks prior which did not show metastasis  -- After discharge, follow up with [facility].    2. Shortness of breath   -- CTA as above   -- IR consulted, bronchoscopy w stent placement cancelled as pt will receive chemotherapy  -- Rad onc consulted, no plan for RT   -- On  supplemental O2 2 Liters, 02 sat 96%    Patient is overall doing well, ambulating independently and requesting to be discharged today.   Wife at bedside.    Will continue to follow.    Tierra Jim NP  Hematology/Oncology  [facility]  [phone] (Office)  [phone] (Alt office)  Evenings and weekends please call MD on call or office

## 2024-04-23 NOTE — PROGRESS NOTE ADULT - PROVIDER SPECIALTY LIST ADULT
Heme/Onc
Heme/Onc
Hospitalist
Intervent Pulmonology
Intervent Pulmonology
Pulmonology
Heme/Onc
Heme/Onc
Pulmonology
Heme/Onc
Hospitalist

## 2024-04-23 NOTE — PROGRESS NOTE ADULT - PROBLEM SELECTOR PLAN 3
C/o difficulty swallowing, no odynophagia; 30lb weight loss (intentional as he has been fasting since cancer dx)  - likely related to compression from R paramediastinal mass  - S&S Eval apprec, s/p MBS, recs are for Regular Solids/ Thin Liquids, SMALL sips  - Dietitian eval apprec, added MVI
C/o difficulty swallowing, no odynophagia; 30lb weight loss (intentional as he has been fasting since cancer dx)  - Likely related to compression from R paramediastinal mass  - SS Evaluation  - Dietitian evaluation
C/o difficulty swallowing, no odynophagia; 30lb weight loss (intentional as he has been fasting since cancer dx)  - likely related to compression from R paramediastinal mass  - S&S Eval apprec, s/p MBS, recs are for Regular Solids/ Thin Liquids, SMALL sips  - Dietitian eval apprec, added MVI
C/o difficulty swallowing, no odynophagia; 30lb weight loss (intentional as he has been fasting since cancer dx)  - Likely related to compression from R paramediastinal mass  - SS Eval apprec, s/p MBS, recs are for Regular Solids/ Thin Liquids, SMALL sips  - Dietitian eval apprec, added MVI
C/o difficulty swallowing, no odynophagia; 30lb weight loss (intentional as he has been fasting since cancer dx)  - likely related to compression from R paramediastinal mass  - S&S Eval apprec, s/p MBS, recs are for Regular Solids/ Thin Liquids, SMALL sips  - Dietitian eval apprec, added MVI
C/o difficulty swallowing, no odynophagia; 30lb weight loss (intentional as he has been fasting since cancer dx)  - Likely related to compression from R paramediastinal mass  - SS Eval apprec, s/p MBS, recs are for Regular Solids/ Thin Liquids, SMALL sips  - Dietitian eval apprec, adding MVI
C/o difficulty swallowing, no odynophagia; 30lb weight loss (intentional as he has been fasting since cancer dx)  - likely related to compression from R paramediastinal mass  - S&S Eval apprec, s/p MBS, recs are for Regular Solids/ Thin Liquids, SMALL sips  - Dietitian eval apprec, added MVI
C/o difficulty swallowing, no odynophagia; 30lb weight loss (intentional as he has been fasting since cancer dx)  - Likely related to compression from R paramediastinal mass  - SS Eval apprec, s/p MBS, recs are for Regular Solids/ Thin Liquids, SMALL sips  - Dietitian eval apprec, added MVI

## 2024-04-23 NOTE — PROGRESS NOTE ADULT - PROBLEM SELECTOR PROBLEM 2
Small cell lung cancer

## 2024-04-23 NOTE — PROGRESS NOTE ADULT - PROBLEM SELECTOR PLAN 4
Hgb is mildly decreased to 12 on admission  - No reports of bleeding  - Iron studies suggestive of MARCELINO and AOCD, c/w daily oral iron  - Trend Hgb daily, keep HG above 7
Hgb is mildly decreased to 12   - No reports of bleeding   - Trend Hgb daily  - F/up iron studies
Hgb is mildly decreased to 12 on admission  - No reports of bleeding  - Iron studies suggestive of MARCELINO and AOCD, c/w daily oral iron  - Trend Hgb daily, keep HG above 7
Hgb is mildly decreased to 12 on admission  - No reports of bleeding  - Iron studies suggestive of MARCELINO and AOCD, will add daily oral iron  - Trend Hgb daily
Hgb is mildly decreased to 12 on admission  - No reports of bleeding  - Iron studies suggestive of MARCELINO and AOCD, c/w daily oral iron  - Trend Hgb daily, keep HG above 7

## 2024-04-23 NOTE — PROGRESS NOTE ADULT - PROBLEM SELECTOR PROBLEM 5
Started PA for dexcom supplies over the phone. Faxed chart notes to [phone]. Transmitter: [id number]  Sensors: [id number]     Sensors are approved through 2/25/23. Waiting on response for transmitter. Pt is aware about the sensors.
Preventive measure

## 2024-04-23 NOTE — DISCHARGE NOTE NURSING/CASE MANAGEMENT/SOCIAL WORK - PATIENT PORTAL LINK FT
You can access the FollowMyHealth Patient Portal offered by [facility] by registering at the following website: [email or web address]. By joining [facility]’s FollowMyHealth portal, you will also be able to view your health information using other applications (apps) compatible with our system.

## 2024-04-23 NOTE — PROGRESS NOTE ADULT - TIME BILLING
Time spent includes direct patient care  (interview and examination of patient), discussion with other providers, support staff and/or patient's family members, review of medical records, ordering diagnostic tests and analyzing results, and documentation.
Reviewing the EMR, vitals, imaging, medication list, recent labs, prior records and coordinating care with medical providers. This time excludes procedures and teaching.
Time spent includes direct patient care  (interview and examination of patient), discussion with other providers, support staff and/or patient's family members, review of medical records, ordering diagnostic tests and analyzing results, and documentation.
Review of laboratory data, radiology results, consultants' recommendations, documentation in [place], discussion with patient/advanced care providers and interdisciplinary staff (such as , social workers, etc). Interventions were performed as documented above.
time spent in review of laboratory data, radiology images and results, discussion with primary team/patient, and monitoring for potential decompensation. Interventions performed as documented above. In addition, time also spent to risks and benefits of the procedure, alternative procedures, diagnostic and therapeutic outcomes as well as further management plan. Complex patient requiring services. Interventional Pulmonology services provided to the patient were separate from general pulmonary service due to complexity of issues and interventions required. Time spent separate from time spent doing any procedures.
time spent in review of laboratory data, radiology images and results, discussion with primary team/patient, and monitoring for potential decompensation. Interventions performed as documented above. In addition, time also spent to risks and benefits of the procedure, alternative procedures, diagnostic and therapeutic outcomes as well as further management plan. Complex patient requiring services. Interventional Pulmonology services provided to the patient were separate from general pulmonary service due to complexity of issues and interventions required. Time spent separate from time spent doing any procedures.
Review of laboratory data, radiology results, consultants' recommendations, documentation in [place], discussion with patient/advanced care providers and interdisciplinary staff (such as , social workers, etc). Interventions were performed as documented above.
Time spent includes direct patient care  (interview and examination of patient), discussion with other providers, support staff and/or patient's family members, review of medical records, ordering diagnostic tests and analyzing results, and documentation.
2

## 2024-04-23 NOTE — DISCHARGE NOTE NURSING/CASE MANAGEMENT/SOCIAL WORK - NSDCPEFALRISK_GEN_ALL_CORE
For information on Fall & Injury Prevention, visit: [email or web address] OR  [email or web address] OR  https://www.cdc.gov/steadi/patient.html

## 2024-04-23 NOTE — PROGRESS NOTE ADULT - PROBLEM SELECTOR PLAN 1
Patient with acute onset of respiratory failure with hypoxia   -CTA chest showed 5.3 x 5.1 cm right paramediastinal mass with extension into mediastinum increased soft tissue density in the mediastinum and sreekanth, causing obstruction of the right upper lobe bronchus and pulmonary artery and significant narrowing of right main stem bronchus and left inferior pulmonary vein  -Most likely causing pt's hypoxia   -No fever, leukocytosis or worsening productive cough. Low suspicion for postobstructive PNA, monitor off abx   - F/u with flu and COVID  - C/w O2 supplementation, currently on 4L NC, wean off O2 as tolerated  - Duonebs ATC, adding mucinex and tessalon ATC for now  - Percocet for cancer related pain (R shoulder pain likely from paramediastinal mass)  - Seen by CTS, apprec recs, f/up further recs
Patient with acute onset of respiratory failure with hypoxia   -CTA chest showed 5.3 x 5.1 cm right paramediastinal mass with extension into mediastinum increased soft tissue density in the mediastinum and sreekanth, causing obstruction of the right upper lobe bronchus and pulmonary artery and significant narrowing of right main stem bronchus and left inferior pulmonary vein  -No fever, leukocytosis or worsening productive cough. Low suspicion for postobstructive PNA, monitor off abx   - Limited RVP is negative  - C/w O2 supplementation, currently on HFNC 40L/50%, wean off O2 as tolerated  - Duonebs ATC, c/w tessalon ATC, robitussin prn, c/w xanax prn for anxiety and may assist with SOB  - C/w Percocet for cancer related pain (R shoulder pain likely from paramediastinal mass), c/w bowel regimen  - CTS recs noted, care referred to Interventional Pulm, apprec recs  - Initially was planned for OR today for rigid bronchoscopy, tumor debulking, stent placement, however plan now has changed to start inpatient chemo ASAP  - Interv Pulm will wait to assess treatment response prior to proceeding with stent placement  - If any worsening respiratory symptoms/status noted IP will proceed with airway intervention
Patient with acute onset of respiratory failure with hypoxia   - CTA chest showed 5.3 x 5.1 cm right paramediastinal mass with extension into mediastinum increased soft tissue density in the mediastinum and sreekanth, causing obstruction of the right upper lobe bronchus and pulmonary artery and significant narrowing of right main stem bronchus and left inferior pulmonary vein  - c/w O2 supplementation, currently on HFNC 50L/74%, wean off O2 as tolerated  - Duonebs ATC, c/w tessalon ATC, robitussin prn, c/w xanax prn for anxiety and may assist with SOB  - c/w Percocet for cancer related pain (R shoulder pain likely from paramediastinal mass), c/w bowel regimen  - seen by CTS and interventional pulm; was planned for rigid bronchoscopy, tumor debulking, stent placement, however plan now has changed to start inpatient chemo; Interv Pulm will wait to assess treatment response prior to proceeding with stent placement  - If any worsening respiratory symptoms/status noted IP will proceed with airway intervention  - chemo to start 4/19, cycle to end 4/21  - appreciate onc f/u
Patient with acute onset of respiratory failure with hypoxia. Weaned from HFNC to NC 4/22.   - CTA chest showed 5.3 x 5.1 cm right paramediastinal mass with extension into mediastinum increased soft tissue density in the mediastinum and sreekanth, causing obstruction of the right upper lobe bronchus and pulmonary artery and significant narrowing of right main stem bronchus and left inferior pulmonary vein  - Duonebs ATC, c/w tessalon ATC, robitussin prn, c/w xanax prn for anxiety and may assist with SOB  - c/w Percocet for cancer related pain (R shoulder pain likely from paramediastinal mass), c/w bowel regimen  - seen by CTS and interventional pulm; was planned for rigid bronchoscopy, tumor debulking, stent placement, however plan now has changed to start inpatient chemo; Interv Pulm will wait to assess treatment response prior to proceeding with stent placement  - for any worsening respiratory symptoms/status noted IP will proceed with airway intervention  - chemo to start 4/19, cycle to end 4/21  - appreciate onc f/u
Patient with acute onset of respiratory failure with hypoxia   -CTA chest showed 5.3 x 5.1 cm right paramediastinal mass with extension into mediastinum increased soft tissue density in the mediastinum and sreekanth, causing obstruction of the right upper lobe bronchus and pulmonary artery and significant narrowing of right main stem bronchus and left inferior pulmonary vein  - C/w O2 supplementation, currently on HFNC 50L/74%, wean off O2 as tolerated  - Duonebs ATC, c/w tessalon ATC, robitussin prn, c/w xanax prn for anxiety and may assist with SOB  - C/w Percocet for cancer related pain (R shoulder pain likely from paramediastinal mass), c/w bowel regimen  - CTS recs noted, care referred to Interventional Pulm, apprec recs  - Initially was planned for OR today for rigid bronchoscopy, tumor debulking, stent placement, however plan now has changed to start inpatient chemo ASAP  - Interv Pulm will wait to assess treatment response prior to proceeding with stent placement  - If any worsening respiratory symptoms/status noted IP will proceed with airway intervention  - chemo to start 4/19
Patient with acute onset of respiratory failure with hypoxia   - CTA chest showed 5.3 x 5.1 cm right paramediastinal mass with extension into mediastinum increased soft tissue density in the mediastinum and sreekanth, causing obstruction of the right upper lobe bronchus and pulmonary artery and significant narrowing of right main stem bronchus and left inferior pulmonary vein  - c/w O2 supplementation, currently on HFNC 50L/75%-->50L/55%, wean off O2 as tolerated  - Duonebs ATC, c/w tessalon ATC, robitussin prn, c/w xanax prn for anxiety and may assist with SOB  - c/w Percocet for cancer related pain (R shoulder pain likely from paramediastinal mass), c/w bowel regimen  - seen by CTS and interventional pulm; was planned for rigid bronchoscopy, tumor debulking, stent placement, however plan now has changed to start inpatient chemo; Interv Pulm will wait to assess treatment response prior to proceeding with stent placement  - for any worsening respiratory symptoms/status noted IP will proceed with airway intervention  - chemo to start 4/19, cycle to end 4/21  - appreciate onc f/u
Patient with acute onset of respiratory failure with hypoxia. Weaned from HFNC to NC 4/22. Sating well on 2L NC.   - CTA chest showed 5.3 x 5.1 cm right paramediastinal mass with extension into mediastinum increased soft tissue density in the mediastinum and sreekanth, causing obstruction of the right upper lobe bronchus and pulmonary artery and significant narrowing of right main stem bronchus and left inferior pulmonary vein  - Duonebs ATC, c/w tessalon ATC, robitussin prn, c/w xanax prn for anxiety and may assist with SOB  - c/w Percocet for cancer related pain (R shoulder pain likely from paramediastinal mass), c/w bowel regimen  - seen by CTS and interventional pulm; was planned for rigid bronchoscopy, tumor debulking, stent placement, however plan now has changed to start inpatient chemo; Interv Pulm will wait to assess treatment response prior to proceeding with stent placement  - for any worsening respiratory symptoms/status noted IP will proceed with airway intervention  - chemo to start 4/19, cycle to end 4/21  - appreciate onc f/u
Patient with acute onset of respiratory failure with hypoxia   -CTA chest showed 5.3 x 5.1 cm right paramediastinal mass with extension into mediastinum increased soft tissue density in the mediastinum and sreekanth, causing obstruction of the right upper lobe bronchus and pulmonary artery and significant narrowing of right main stem bronchus and left inferior pulmonary vein  -Most likely causing pt's hypoxia   -No fever, leukocytosis or worsening productive cough. Low suspicion for postobstructive PNA, monitor off abx   - Limited RVP is negative  - C/w O2 supplementation, currently on 6L NC, wean off O2 as tolerated  - Duonebs ATC, c/w tessalon ATC, robitussin prn, adding xanax prn for anxiety and may assist with SOB  - C/w Percocet for cancer related pain (R shoulder pain likely from paramediastinal mass)  - CTS recs noted, care referred to Interventional Pulm, apprec recs and d/w them  - Initially was planned for OR tomorrow for rigid bronchoscopy, tumor debulking, stent placement, however plan now has changed to start inpatient chemo ASAP  - Interv Pulm will wait to assess treatment response prior to proceeding with stent placement

## 2024-04-24 ENCOUNTER — OUTPATIENT (OUTPATIENT)
Dept: OUTPATIENT SERVICES | Facility: HOSPITAL | Age: 54
LOS: 1 days | Discharge: HOME | End: 2024-04-24
Payer: MEDICAID

## 2024-04-24 ENCOUNTER — APPOINTMENT (OUTPATIENT)
Dept: PULMONOLOGY | Facility: CLINIC | Age: 54
End: 2024-04-24
Payer: MEDICAID

## 2024-04-24 DIAGNOSIS — C34.90 MALIGNANT NEOPLASM OF UNSPECIFIED PART OF UNSPECIFIED BRONCHUS OR LUNG: ICD-10-CM

## 2024-04-24 DIAGNOSIS — Z87.39 PERSONAL HISTORY OF OTHER DISEASES OF THE MUSCULOSKELETAL SYSTEM AND CONNECTIVE TISSUE: ICD-10-CM

## 2024-04-24 DIAGNOSIS — Z86.2 PERSONAL HISTORY OF DISEASES OF THE BLOOD AND BLOOD-FORMING ORGANS AND CERTAIN DISORDERS INVOLVING THE IMMUNE MECHANISM: ICD-10-CM
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
## 2024-04-24 PROBLEM — Z00.00 ENCOUNTER FOR PREVENTIVE HEALTH EXAMINATION: Status: ACTIVE | Noted: 2024-04-24

## 2024-04-24 PROCEDURE — 99406 BEHAV CHNG SMOKING 3-10 MIN: CPT | Mod: 95

## 2024-04-24 PROCEDURE — 99495 TRANSJ CARE MGMT MOD F2F 14D: CPT

## 2024-04-24 RX ORDER — POLYETHYLENE GLYCOL 3350 17 G/17G
17 POWDER, FOR SOLUTION ORAL
Refills: 0 | Status: ACTIVE | COMMUNITY

## 2024-04-24 RX ORDER — GLUCOSAMINE HCL/CHONDROITIN SU 500-400 MG
3 CAPSULE ORAL
Refills: 0 | Status: ACTIVE | COMMUNITY

## 2024-04-24 RX ORDER — FILGRASTIM 480 UG/.8ML
480 INJECTION, SOLUTION INTRAVENOUS; SUBCUTANEOUS
Refills: 0 | Status: ACTIVE | COMMUNITY

## 2024-04-24 RX ORDER — FLUTICASONE PROPIONATE 50 UG/1
50 SPRAY, METERED NASAL
Refills: 0 | Status: ACTIVE | COMMUNITY

## 2024-04-24 RX ORDER — LORATADINE 10 MG/1
10 TABLET ORAL
Refills: 0 | Status: ACTIVE | COMMUNITY

## 2024-04-24 RX ORDER — CHLORHEXIDINE GLUCONATE 4 %
325 (65 FE) LIQUID (ML) TOPICAL
Refills: 0 | Status: ACTIVE | COMMUNITY

## 2024-04-24 RX ORDER — MULTIVIT-MIN/FOLIC/VIT K/LYCOP 400-300MCG
500 TABLET ORAL
Refills: 0 | Status: ACTIVE | COMMUNITY

## 2024-04-24 RX ORDER — PNV NO.95/FERROUS FUM/FOLIC AC 28MG-0.8MG
TABLET ORAL
Refills: 0 | Status: ACTIVE | COMMUNITY

## 2024-04-24 NOTE — HISTORY OF PRESENT ILLNESS
[FreeTextEntry2] : Patient is a 54y/o M 2pp for 40yrs smoker with Pmhx of newly diagnosed small cell lung cancer presents to [facility] ED with worsening SOB and also, wanting a 2nd opinion, AHRF, most likely due to lung cancer causing obstructive pathology. Presents for post hospitalization F/U via telemedicine.    During hospitalization CTA chest showed 5.3 x 5.1 cm right paramediastinal mass with extension into mediastinum increased soft tissue density in the mediastinum and sreekanth, causing obstruction of the right upper lobe bronchus and pulmonary artery and significant narrowing of right main stem bronchus and left inferior pulmonary vein. Initially was planned for rigid bronchoscopy, tumor debulking, stent placement, was started on inpatient chemo ASAP.  He was treated with supplemental O2, Duonebs ATC, tessalon ATC, robitussin prn, xanax prn for anxiety and assist with SOB. C/w Percocet for cancer related pain (R shoulder pain likely from paramediastinal mass), c/w bowel regimen.   Patient for plans for herbal medicine in conjunction with chemotherapy. Advised to notbtake currently and will need to arrange nutrition follow up outpatient to assess if any reactions with chemoIO. Of note MRI Brain not completed as from an accident as metal that is not compatible. With CT Brain with contrast a few weeks prior which did not show metastasis, with F/U with Dr. Bowen Avalos at [facility].   On today's visit, patient is doing well overall, states he is feeling much better than when he was first admitted. Currently denies cough, SOB, wheezing, fever, chills, chest tightness, hoarseness, change in voice, or rhinorrhea. Is using oxygen PRN with monitoring of O2 saturations, 95% on today's visit. Stopped smoking cigarette 1.5 months ago.

## 2024-04-24 NOTE — PHYSICAL EXAM
[Normal] : no acute distress, well nourished, well developed and well-appearing [Normal Sclera/Conjunctiva] : normal sclera/conjunctiva [No Respiratory Distress] : no respiratory distress  [No Accessory Muscle Use] : no accessory muscle use [Coordination Grossly Intact] : coordination grossly intact [No Focal Deficits] : no focal deficits [Normal Affect] : the affect was normal [Normal Insight/Judgement] : insight and judgment were intact

## 2024-04-24 NOTE — ASSESSMENT
[FreeTextEntry1] : -All medications reviewed with patient: continue as ordered, on Neupogen -Continue use of oxygen as needed; with monitoring of oxygen saturation. Advised use of portable O2 when leaving the home. With delivery of sationary O2 planned for today, saturation 95% on visit today -Reviewed importance of continued smoking cessation; smoked cigarettes 2ppd/40years; quit 1.5 months ago s/p cancer diagnosis -F/U with /Mark 6/4; will schedule earlier appmt -F/U with [facility] -F/U with Oncology at [facility] -F/U with PCP; currently do not have one, provided with information on obtaining -Advised to call 911 of go to ER immediately with any S/S of acute respiratory distress or worsening of symptoms.

## 2024-04-25 PROBLEM — Z87.828 HISTORY OF MOTOR VEHICLE ACCIDENT: Status: RESOLVED | Noted: 2024-04-25 | Resolved: 2024-04-25

## 2024-05-01 ENCOUNTER — APPOINTMENT (OUTPATIENT)
Dept: HEMATOLOGY ONCOLOGY | Facility: CLINIC | Age: 54
End: 2024-05-01
Payer: MEDICAID

## 2024-05-01 VITALS
TEMPERATURE: 98 F | HEART RATE: 87 BPM | SYSTOLIC BLOOD PRESSURE: 104 MMHG | BODY MASS INDEX: 24.5 KG/M2 | OXYGEN SATURATION: 98 % | HEIGHT: 74.41 IN | RESPIRATION RATE: 16 BRPM | DIASTOLIC BLOOD PRESSURE: 74 MMHG | WEIGHT: 192.9 LBS

## 2024-05-01 DIAGNOSIS — F17.200 NICOTINE DEPENDENCE, UNSPECIFIED, UNCOMPLICATED: ICD-10-CM

## 2024-05-01 DIAGNOSIS — Z87.828 PERSONAL HISTORY OF OTHER (HEALED) PHYSICAL INJURY AND TRAUMA: ICD-10-CM

## 2024-05-01 PROCEDURE — 99205 OFFICE O/P NEW HI 60 MIN: CPT

## 2024-05-01 NOTE — ASSESSMENT
[FreeTextEntry1] : Hospital medical oncology note, radiation oncology note and interventional pulmonary note, CT angiogram of the chest report, PET/CT scan report, head CT scan report, FNA report reviewed. Clinically extensive small cell lung cancer. -- Reviewed diagnosis/prognosis and management options. -- Discussed roles of surgery/radiation/systemic therapy in lung cancer management. --have path reviewed by [facility] pathology  Should patient wish for continued treatment at [facility]: -- With extensive small cell lung cancer, would recommend chemoimmunotherapy.  Patient had initially declined standard therapy, wishing to pursue homeopathic treatments.  However, he has now agreed to chemotherapy/immunotherapy recommended. 4/19/2024-4/21/2024-Received first cycle of etoposide/carboplatin as inpatient at [facility].  Plans were to add the atezolizumab with cycle #2.  He received zarxio x 7 days following chemotherapy. --Potential side effects of etoposide/carboplatin/atezolizumab reviewed including but not limited to alopecia, allergic/hypersensitivity reaction, nausea/vomiting, cytopenias, thyroiditis/endocrinopathy, pneumonitis, pancreatitis, hepatitis, colitis, skin toxicity.  Patient gave verbal and written consent for treatment. --recommended obtaining lab work-patient declined doing today -- To consider radiation should progressive symptoms develop (radiation oncology consulted during recent hospitalization and did not recommend RT at that time). T/C bronchial stent placement before RT, as well if needed, pending course. --recommended WBBS in light of head CT results-patient declines having this done at this time, wishing to defer until his next set of scans  Plan: Schedule cycle #2 Etoposide 100 mg/m IV days 1, 2, 3 along with Carboplatin AUC 5-day 1 and Atezolizumab 1200 mg IV day 1.  Cycle is to be repeated every 21 days x total of 4 cycles (first cycle given in the hospital as noted above).  Onpro support. Then to be reevaluated for further treatment, possible maintenance atezolizumab 1200 mg IV day 1 every 21 days until progression of disease or unacceptable toxicity.   --> Patient and his wife were given the opportunity to ask questions.  Their questions have been answered to their apparent satisfaction at this time.  They wish to consider things, and will let us know should they wish for further oncology treatment here at the [facility], versus [facility].  Call to be made 5/3/2024 to confirm their decision. They were appreciative.

## 2024-05-01 NOTE — PHYSICAL EXAM
[Restricted in physically strenuous activity but ambulatory and able to carry out work of a light or sedentary nature] : Status 1- Restricted in physically strenuous activity but ambulatory and able to carry out work of a light or sedentary nature, e.g., light house work, office work [Normal] : affect appropriate [de-identified] : no LAD appreciated on exam today

## 2024-05-01 NOTE — HISTORY OF PRESENT ILLNESS
[Disease: _____________________] : Disease: [unfilled] [de-identified] : 53-year-old male smoker recently diagnosed at [facility] [place] (Dr. Avalos) with small cell lung cancer.   3/5/2024-FNA of right neck mass positive for malignant cells.  Small cell carcinoma with necrosis. 3/14/2024-PET/CT scan-enlarged cervical and thoracic lymphadenopathy, minimally FDG avid right upper lobe nodule.  4/1/2024-CT brain-negative for metastases (unable to have MRI due to metal which she has from prior accident, not compatible).  Indeterminate sclerotic and expansile lesion in the right frontal calvarium.  Bone scan may be performed to further evaluate. It was recommended that he receive chemotherapy/immunotherapy.  He initially opted to pursue homeopathic remedies and was not interested in standard of care chemoimmunotherapy.  4/15/2024-Then patient presented to [facility] with shortness of breath.  No chest pain, hemoptysis or lower extremity edema.  He has a mild productive cough. 4/15/2024-CT angiogram of the chest-5.3 x 5.1 cm right paramediastinal mass with extension into the mediastinum.  Increased soft tissue density in the mediastinum and sreekanth, causing obstruction of the right upper lobe bronchus and pulmonary artery and significant narrowing of the right mainstem bronchus and left inferior pulmonary vein.  Axillary lymphadenopathy, likely metastatic.  4/19/2024-4/21/2024-Received first cycle of etoposide/carboplatin as inpatient at [facility].  Plans were to add the atezolizumab with cycle #2.  He received zarxio x 7 days following chemotherapy.  Breathing "100% better." States right neck mass decreased. Taking medical marijuana-helps with appetite. No c/o SOB, CP, cough, hemoptysis. No H/A or bone pain. No N/V. [de-identified] : Small cell carcinoma

## 2024-05-09 PROBLEM — Z87.39 PERSONAL HISTORY OF OTHER DISEASES OF THE MUSCULOSKELETAL SYSTEM AND CONNECTIVE TISSUE: Chronic | Status: ACTIVE | Noted: 2024-05-07

## 2024-05-09 PROBLEM — Z86.2 PERSONAL HISTORY OF DISEASES OF THE BLOOD AND BLOOD-FORMING ORGANS AND CERTAIN DISORDERS INVOLVING THE IMMUNE MECHANISM: Chronic | Status: ACTIVE | Noted: 2024-05-07

## 2024-05-09 PROBLEM — J96.01 ACUTE RESPIRATORY FAILURE WITH HYPOXIA: Chronic | Status: ACTIVE | Noted: 2024-05-07

## 2024-05-10 ENCOUNTER — RESULT REVIEW (OUTPATIENT)
Age: 54
End: 2024-05-10

## 2024-05-10 DIAGNOSIS — Z91.89 OTHER SPECIFIED PERSONAL RISK FACTORS, NOT ELSEWHERE CLASSIFIED: ICD-10-CM

## 2024-05-10 PROCEDURE — 88321 CONSLTJ&REPRT SLD PREP ELSWR: CPT

## 2024-05-10 RX ORDER — PROCHLORPERAZINE MALEATE 10 MG/1
10 TABLET ORAL
Qty: 30 | Refills: 2 | Status: ACTIVE | COMMUNITY
Start: 2024-05-10 | End: 1900-01-01

## 2024-05-13 ENCOUNTER — RESULT REVIEW (OUTPATIENT)
Age: 54
End: 2024-05-13

## 2024-05-13 ENCOUNTER — APPOINTMENT (OUTPATIENT)
Dept: HEMATOLOGY ONCOLOGY | Facility: CLINIC | Age: 54
End: 2024-05-13
Payer: MEDICAID

## 2024-05-13 ENCOUNTER — APPOINTMENT (OUTPATIENT)
Dept: PULMONOLOGY | Facility: CLINIC | Age: 54
End: 2024-05-13
Payer: MEDICAID

## 2024-05-13 ENCOUNTER — APPOINTMENT (OUTPATIENT)
Dept: INFUSION THERAPY | Facility: HOSPITAL | Age: 54
End: 2024-05-13

## 2024-05-13 ENCOUNTER — NON-APPOINTMENT (OUTPATIENT)
Age: 54
End: 2024-05-13

## 2024-05-13 VITALS
BODY MASS INDEX: 25.18 KG/M2 | HEIGHT: 73 IN | SYSTOLIC BLOOD PRESSURE: 96 MMHG | WEIGHT: 190 LBS | DIASTOLIC BLOOD PRESSURE: 68 MMHG | OXYGEN SATURATION: 98 % | TEMPERATURE: 97.6 F | RESPIRATION RATE: 16 BRPM | HEART RATE: 86 BPM

## 2024-05-13 DIAGNOSIS — J96.01 ACUTE RESPIRATORY FAILURE WITH HYPOXIA: ICD-10-CM

## 2024-05-13 DIAGNOSIS — R20.0 ANESTHESIA OF SKIN: ICD-10-CM

## 2024-05-13 DIAGNOSIS — J98.09 OTHER DISEASES OF BRONCHUS, NOT ELSEWHERE CLASSIFIED: ICD-10-CM

## 2024-05-13 DIAGNOSIS — J44.9 CHRONIC OBSTRUCTIVE PULMONARY DISEASE, UNSPECIFIED: ICD-10-CM

## 2024-05-13 LAB
ALBUMIN SERPL ELPH-MCNC: 4.3 G/DL — SIGNIFICANT CHANGE UP (ref 3.3–5)
ALP SERPL-CCNC: 55 U/L — SIGNIFICANT CHANGE UP (ref 40–120)
ALT FLD-CCNC: 10 U/L — SIGNIFICANT CHANGE UP (ref 10–45)
ANION GAP SERPL CALC-SCNC: 9 MMOL/L — SIGNIFICANT CHANGE UP (ref 5–17)
AST SERPL-CCNC: 31 U/L — SIGNIFICANT CHANGE UP (ref 10–40)
BASOPHILS # BLD AUTO: 0.03 K/UL — SIGNIFICANT CHANGE UP (ref 0–0.2)
BASOPHILS NFR BLD AUTO: 0.6 % — SIGNIFICANT CHANGE UP (ref 0–2)
BILIRUB SERPL-MCNC: 0.3 MG/DL — SIGNIFICANT CHANGE UP (ref 0.2–1.2)
BUN SERPL-MCNC: 14 MG/DL — SIGNIFICANT CHANGE UP (ref 7–23)
CALCIUM SERPL-MCNC: 10.2 MG/DL — SIGNIFICANT CHANGE UP (ref 8.4–10.5)
CHLORIDE SERPL-SCNC: 103 MMOL/L — SIGNIFICANT CHANGE UP (ref 96–108)
CO2 SERPL-SCNC: 27 MMOL/L — SIGNIFICANT CHANGE UP (ref 22–31)
CORTIS AM PEAK SERPL-MCNC: 10 UG/DL — SIGNIFICANT CHANGE UP (ref 6–18.4)
CREAT SERPL-MCNC: 0.74 MG/DL — SIGNIFICANT CHANGE UP (ref 0.5–1.3)
EGFR: 109 ML/MIN/1.73M2 — SIGNIFICANT CHANGE UP
EOSINOPHIL # BLD AUTO: 0.01 K/UL — SIGNIFICANT CHANGE UP (ref 0–0.5)
EOSINOPHIL NFR BLD AUTO: 0.2 % — SIGNIFICANT CHANGE UP (ref 0–6)
FERRITIN SERPL-MCNC: 460 NG/ML — HIGH (ref 30–400)
FOLATE SERPL-MCNC: 15.2 NG/ML — SIGNIFICANT CHANGE UP
GLUCOSE SERPL-MCNC: 96 MG/DL — SIGNIFICANT CHANGE UP (ref 70–99)
HBV CORE AB SER-ACNC: SIGNIFICANT CHANGE UP
HBV SURFACE AB SER-ACNC: SIGNIFICANT CHANGE UP
HBV SURFACE AG SER-ACNC: SIGNIFICANT CHANGE UP
HCT VFR BLD CALC: 34.5 % — LOW (ref 39–50)
HCV AB S/CO SERPL IA: 0.09 S/CO — SIGNIFICANT CHANGE UP (ref 0–0.99)
HCV AB SERPL-IMP: SIGNIFICANT CHANGE UP
HGB BLD-MCNC: 11.8 G/DL — LOW (ref 13–17)
IMM GRANULOCYTES NFR BLD AUTO: 0.9 % — SIGNIFICANT CHANGE UP (ref 0–0.9)
IRON SATN MFR SERPL: 17 % — SIGNIFICANT CHANGE UP (ref 16–55)
IRON SATN MFR SERPL: 52 UG/DL — SIGNIFICANT CHANGE UP (ref 45–165)
LYMPHOCYTES # BLD AUTO: 1.98 K/UL — SIGNIFICANT CHANGE UP (ref 1–3.3)
LYMPHOCYTES # BLD AUTO: 36.9 % — SIGNIFICANT CHANGE UP (ref 13–44)
MCHC RBC-ENTMCNC: 31.2 PG — SIGNIFICANT CHANGE UP (ref 27–34)
MCHC RBC-ENTMCNC: 34.2 G/DL — SIGNIFICANT CHANGE UP (ref 32–36)
MCV RBC AUTO: 91.3 FL — SIGNIFICANT CHANGE UP (ref 80–100)
MONOCYTES # BLD AUTO: 0.56 K/UL — SIGNIFICANT CHANGE UP (ref 0–0.9)
MONOCYTES NFR BLD AUTO: 10.4 % — SIGNIFICANT CHANGE UP (ref 2–14)
NEUTROPHILS # BLD AUTO: 2.73 K/UL — SIGNIFICANT CHANGE UP (ref 1.8–7.4)
NEUTROPHILS NFR BLD AUTO: 51 % — SIGNIFICANT CHANGE UP (ref 43–77)
NON-GYNECOLOGICAL CYTOLOGY STUDY: SIGNIFICANT CHANGE UP
NRBC # BLD: 0 /100 WBCS — SIGNIFICANT CHANGE UP (ref 0–0)
PLATELET # BLD AUTO: 448 K/UL — HIGH (ref 150–400)
POTASSIUM SERPL-MCNC: 4.6 MMOL/L — SIGNIFICANT CHANGE UP (ref 3.5–5.3)
POTASSIUM SERPL-SCNC: 4.6 MMOL/L — SIGNIFICANT CHANGE UP (ref 3.5–5.3)
PROT SERPL-MCNC: 6.7 G/DL — SIGNIFICANT CHANGE UP (ref 6–8.3)
RBC # BLD: 3.78 M/UL — LOW (ref 4.2–5.8)
RBC # FLD: 16.7 % — HIGH (ref 10.3–14.5)
SODIUM SERPL-SCNC: 140 MMOL/L — SIGNIFICANT CHANGE UP (ref 135–145)
T4 FREE+ TSH PNL SERPL: 0.86 UIU/ML — SIGNIFICANT CHANGE UP (ref 0.27–4.2)
TIBC SERPL-MCNC: 311 UG/DL — SIGNIFICANT CHANGE UP (ref 220–430)
UIBC SERPL-MCNC: 259 UG/DL — SIGNIFICANT CHANGE UP (ref 110–370)
VIT B12 SERPL-MCNC: 1307 PG/ML — HIGH (ref 232–1245)
WBC # BLD: 5.36 K/UL — SIGNIFICANT CHANGE UP (ref 3.8–10.5)
WBC # FLD AUTO: 5.36 K/UL — SIGNIFICANT CHANGE UP (ref 3.8–10.5)

## 2024-05-13 PROCEDURE — 99214 OFFICE O/P EST MOD 30 MIN: CPT

## 2024-05-13 PROCEDURE — 76604 US EXAM CHEST: CPT

## 2024-05-13 RX ORDER — IPRATROPIUM BROMIDE AND ALBUTEROL SULFATE 2.5; .5 MG/3ML; MG/3ML
0.5-2.5 (3) SOLUTION RESPIRATORY (INHALATION)
Qty: 120 | Refills: 0 | Status: ACTIVE | COMMUNITY
Start: 2024-05-13 | End: 1900-01-01

## 2024-05-13 RX ORDER — PROCHLORPERAZINE MALEATE 10 MG/1
10 TABLET ORAL EVERY 6 HOURS
Qty: 40 | Refills: 3 | Status: ACTIVE | COMMUNITY
Start: 2024-05-13 | End: 1900-01-01

## 2024-05-13 RX ORDER — PEGFILGRASTIM-JMDB 6 MG/.6ML
6 INJECTION SUBCUTANEOUS ONCE
Qty: 1 | Refills: 3 | Status: ACTIVE | COMMUNITY
Start: 2024-05-13 | End: 1900-01-01

## 2024-05-13 NOTE — REVIEW OF SYSTEMS
[Diarrhea: Grade 0] : Diarrhea: Grade 0 [Negative] : Allergic/Immunologic [Confused] : no confusion [Dizziness] : no dizziness [Fainting] : no fainting [Difficulty Walking] : no difficulty walking [de-identified] : mild numbness on fingers

## 2024-05-13 NOTE — PROCEDURE
[Thoracic Ultrasound] : Thoracic Ultrasound [A line] : A line: Yes [Normal] : Normal [Pleural Effusion] : Pleural Effusion: No [de-identified] : Improved dyspnea. Lung cancer.  [FreeTextEntry2] : No effusions.

## 2024-05-13 NOTE — HISTORY OF PRESENT ILLNESS
[Former] : former [TextBox_4] : Interventional Pulmonology Consultation/Visit Note  53-year-old male smoker recently diagnosed at [facility] (Dr. Avalos) with small cell lung cancer. 3/5/2024-FNA of right neck mass positive for malignant cells. Small cell carcinoma with necrosis. 3/14/2024-PET/CT scan-enlarged cervical and thoracic lymphadenopathy, minimally FDG avid right upper lobe nodule. 4/1/2024-CT brain-negative for metastases (unable to have MRI due to metal which she has from prior accident, not compatible). Indeterminate sclerotic and expansile lesion in the right frontal calvarium. Bone scan may be performed to further evaluate. It was recommended that he receive chemotherapy/immunotherapy. He initially opted to pursue homeopathic remedies and was not interested in standard of care chemoimmunotherapy.  4/15/2024-Then patient presented to [facility] with shortness of breath. No chest pain, hemoptysis or lower extremity edema. He has a mild productive cough. 4/15/2024-CT angiogram of the chest-5.3 x 5.1 cm right paramediastinal mass with extension into the mediastinum. Increased soft tissue density in the mediastinum and sreekanth, causing obstruction of the right upper lobe bronchus and pulmonary artery and significant narrowing of the right mainstem bronchus and left inferior pulmonary vein. Axillary lymphadenopathy, likely metastatic.  Interventional Pulm Course: IP was consulted for possible evaluation of endobronchial intervention. Therapeutic bronchoscopy with stent placement was ordered, though patient was hesitant and had agreed to systemic therapy, and given chemosensitive nature of underlying tumor as well as plans to pursue inpatient treatment, we decided to hold off based on patient preference.   4/19/2024-4/21/2024-Received first cycle of etoposide/carboplatin as inpatient at [facility]. Plans were to add the atezolizumab with cycle #2. He received zarxio x 7 days following chemotherapy.  Had good response to treatment from breathing perspective when inpatient, and thus decision made to hold off on endobronchial intervention with close OP follow up.    Disease: Left lung   Pathology: Small cell carcinoma  5/13/24- On evaluation patient denies any significant shortness of breath.  He denies significant cough, fevers, or chills.

## 2024-05-13 NOTE — HISTORY OF PRESENT ILLNESS
[Disease: _____________________] : Disease: [unfilled] [de-identified] : 53-year-old male smoker recently diagnosed at [facility] (Dr. Avalos) with small cell lung cancer.   3/5/2024-FNA of right neck mass positive for malignant cells.  Small cell carcinoma with necrosis. 3/14/2024-PET/CT scan-enlarged cervical and thoracic lymphadenopathy, minimally FDG avid right upper lobe nodule.  4/1/2024-CT brain-negative for metastases (unable to have MRI due to metal which she has from prior accident, not compatible).  Indeterminate sclerotic and expansile lesion in the right frontal calvarium.  Bone scan may be performed to further evaluate. It was recommended that he receive chemotherapy/immunotherapy.  He initially opted to pursue homeopathic remedies and was not interested in standard of care chemoimmunotherapy.  4/15/2024-Then patient presented to [facility] with shortness of breath.  No chest pain, hemoptysis or lower extremity edema.  He has a mild productive cough. 4/15/2024-CT angiogram of the chest-5.3 x 5.1 cm right paramediastinal mass with extension into the mediastinum.  Increased soft tissue density in the mediastinum and sreekanth, causing obstruction of the right upper lobe bronchus and pulmonary artery and significant narrowing of the right mainstem bronchus and left inferior pulmonary vein.  Axillary lymphadenopathy, likely metastatic.  4/19/2024-4/21/2024-Received first cycle of etoposide/carboplatin as inpatient at [facility].  Plans were to add the atezolizumab with cycle #2.  He received zarxio x 7 days following chemotherapy.  Breathing "100% better." States right neck mass decreased. Taking medical marijuana-helps with appetite. No c/o SOB, CP, cough, hemoptysis. No H/A or bone pain. No N/V. [de-identified] : Small cell carcinoma [de-identified] : Here for 2nd cycle of chemo and first cycle of Atezolizumab.  He feels clinically well. Denies any fever, chills, shortness of breath of cough.  Did not require to take any antiemetics at home after first cycle of chemotherapy. He feels mild numbness on fingers but does not affect his daily activities.

## 2024-05-13 NOTE — ASSESSMENT
[FreeTextEntry1] : 53-year-old male smoker w/ recently diagnosed small cell carcinoma at [facility] [place] (Dr. Avalos) with small cell lung cancer. IP was consulted for possible evaluation of endobronchial intervention.  Per patient preference no endobronchial intervention has been pursued at this time.  Patient currently doing well.  #Small Cell Carcinoma with airway obstruction - patient currently reports minimal respiratory symptoms - will continue to hold off on pursuing endobronchial evaluation/intervention at this point given improvement in symptoms and patient responding well to systemic treatment -thoracic ultrasound negative for pleural effusion  Will continue outpatient clinical and radiographic follow up to determine need for future intervention. Advised patient to contact us if any new symptoms such as hemoptysis, shortness of breath, persistent cough noted.   Continue bronchodilators for COPD Recommend continued smoking cessation.

## 2024-05-13 NOTE — PHYSICAL EXAM
[Restricted in physically strenuous activity but ambulatory and able to carry out work of a light or sedentary nature] : Status 1- Restricted in physically strenuous activity but ambulatory and able to carry out work of a light or sedentary nature, e.g., light house work, office work [Normal] : affect appropriate [de-identified] : no LAD appreciated on exam today

## 2024-05-13 NOTE — ASSESSMENT
[FreeTextEntry1] : [facility] medical oncology note, radiation oncology note and interventional pulmonary note, CT angiogram of the chest report, PET/CT scan report, head CT scan report, FNA report reviewed. Clinically extensive small cell lung cancer. --Reviewed diagnosis/prognosis and management options. --Discussed roles of surgery/radiation/systemic therapy in lung cancer management. --have path reviewed by [facility] pathology  Should patient wish for continued treatment at [facility]: --With extensive small cell lung cancer, would recommend chemoimmunotherapy.  Patient had initially declined standard therapy, wishing to pursue homeopathic treatments.  However, he has now agreed to chemotherapy/immunotherapy recommended. 4/19/2024-4/21/2024-Received first cycle of etoposide/carboplatin as inpatient at [facility].  Plans were to add the atezolizumab with cycle #2.  He received Zarxio x 7 days following chemotherapy. --Potential side effects of etoposide/carboplatin/atezolizumab reviewed including but not limited to alopecia, allergic/hypersensitivity reaction, nausea/vomiting, cytopenias, thyroiditis/endocrinopathy, pneumonitis, pancreatitis, hepatitis, colitis, skin toxicity.  Patient gave verbal and written consent for treatment. --Patient clinically stable, ok to proceed cycle 2 today (Atezolizumab cycle 1)  --Compazine (antiemetics) were sent to his local pharmacy --Patient is on allopurinol to prevent TLS  --labs were done today including hepatitis profile  --Supposed to get Fulphila on Day4, patient wishes to have it at home, will check authorization with outpatient [facility] pharmacy.   -- To consider radiation should progressive symptoms develop (radiation oncology consulted during recent hospitalization and did not recommend RT at that time). T/C bronchial stent placement before RT, as well if needed, pending course. --recommended WBBS in light of head CT results-patient declines having this done at this time, wishing to defer until his next set of scans  Plan: Schedule cycle #2 Etoposide 100 mg/m IV days 1, 2, 3 along with Carboplatin AUC 5-day 1 and Atezolizumab 1200 mg IV day 1.  Cycle is to be repeated every 21 days x total of 4 cycles (first cycle given in the hospital as noted above).  Onpro support. Then to be reevaluated for further treatment, possible maintenance atezolizumab 1200 mg IV day 1 every 21 days until progression of disease or unacceptable toxicity.   Today 5/13/2024 Cycle 2 today

## 2024-05-14 ENCOUNTER — NON-APPOINTMENT (OUTPATIENT)
Age: 54
End: 2024-05-14

## 2024-05-14 ENCOUNTER — APPOINTMENT (OUTPATIENT)
Dept: PULMONOLOGY | Facility: CLINIC | Age: 54
End: 2024-05-14

## 2024-05-14 ENCOUNTER — APPOINTMENT (OUTPATIENT)
Dept: INFUSION THERAPY | Facility: HOSPITAL | Age: 54
End: 2024-05-14

## 2024-05-14 DIAGNOSIS — R11.2 NAUSEA WITH VOMITING, UNSPECIFIED: ICD-10-CM

## 2024-05-14 DIAGNOSIS — Z51.11 ENCOUNTER FOR ANTINEOPLASTIC CHEMOTHERAPY: ICD-10-CM

## 2024-05-15 ENCOUNTER — APPOINTMENT (OUTPATIENT)
Dept: INFUSION THERAPY | Facility: HOSPITAL | Age: 54
End: 2024-05-15

## 2024-05-16 ENCOUNTER — APPOINTMENT (OUTPATIENT)
Dept: INFUSION THERAPY | Facility: HOSPITAL | Age: 54
End: 2024-05-16

## 2024-06-03 ENCOUNTER — RESULT REVIEW (OUTPATIENT)
Age: 54
End: 2024-06-03

## 2024-06-03 ENCOUNTER — APPOINTMENT (OUTPATIENT)
Dept: INFUSION THERAPY | Facility: HOSPITAL | Age: 54
End: 2024-06-03

## 2024-06-03 ENCOUNTER — APPOINTMENT (OUTPATIENT)
Dept: HEMATOLOGY ONCOLOGY | Facility: CLINIC | Age: 54
End: 2024-06-03

## 2024-06-03 LAB
ALBUMIN SERPL ELPH-MCNC: 4.2 G/DL — SIGNIFICANT CHANGE UP (ref 3.3–5)
ALP SERPL-CCNC: 59 U/L — SIGNIFICANT CHANGE UP (ref 40–120)
ALT FLD-CCNC: 12 U/L — SIGNIFICANT CHANGE UP (ref 10–45)
ANION GAP SERPL CALC-SCNC: 12 MMOL/L — SIGNIFICANT CHANGE UP (ref 5–17)
AST SERPL-CCNC: 22 U/L — SIGNIFICANT CHANGE UP (ref 10–40)
BASOPHILS # BLD AUTO: 0.01 K/UL — SIGNIFICANT CHANGE UP (ref 0–0.2)
BASOPHILS NFR BLD AUTO: 0.2 % — SIGNIFICANT CHANGE UP (ref 0–2)
BILIRUB SERPL-MCNC: 0.3 MG/DL — SIGNIFICANT CHANGE UP (ref 0.2–1.2)
BUN SERPL-MCNC: 16 MG/DL — SIGNIFICANT CHANGE UP (ref 7–23)
CALCIUM SERPL-MCNC: 9.7 MG/DL — SIGNIFICANT CHANGE UP (ref 8.4–10.5)
CHLORIDE SERPL-SCNC: 106 MMOL/L — SIGNIFICANT CHANGE UP (ref 96–108)
CO2 SERPL-SCNC: 25 MMOL/L — SIGNIFICANT CHANGE UP (ref 22–31)
CREAT SERPL-MCNC: 0.89 MG/DL — SIGNIFICANT CHANGE UP (ref 0.5–1.3)
EGFR: 103 ML/MIN/1.73M2 — SIGNIFICANT CHANGE UP
EOSINOPHIL # BLD AUTO: 0.04 K/UL — SIGNIFICANT CHANGE UP (ref 0–0.5)
EOSINOPHIL NFR BLD AUTO: 0.7 % — SIGNIFICANT CHANGE UP (ref 0–6)
GLUCOSE SERPL-MCNC: 85 MG/DL — SIGNIFICANT CHANGE UP (ref 70–99)
HCT VFR BLD CALC: 34.7 % — LOW (ref 39–50)
HGB BLD-MCNC: 11.8 G/DL — LOW (ref 13–17)
IMM GRANULOCYTES NFR BLD AUTO: 0.3 % — SIGNIFICANT CHANGE UP (ref 0–0.9)
LYMPHOCYTES # BLD AUTO: 1.89 K/UL — SIGNIFICANT CHANGE UP (ref 1–3.3)
LYMPHOCYTES # BLD AUTO: 32.9 % — SIGNIFICANT CHANGE UP (ref 13–44)
MCHC RBC-ENTMCNC: 31.8 PG — SIGNIFICANT CHANGE UP (ref 27–34)
MCHC RBC-ENTMCNC: 34 G/DL — SIGNIFICANT CHANGE UP (ref 32–36)
MCV RBC AUTO: 93.5 FL — SIGNIFICANT CHANGE UP (ref 80–100)
MONOCYTES # BLD AUTO: 0.53 K/UL — SIGNIFICANT CHANGE UP (ref 0–0.9)
MONOCYTES NFR BLD AUTO: 9.2 % — SIGNIFICANT CHANGE UP (ref 2–14)
NEUTROPHILS # BLD AUTO: 3.25 K/UL — SIGNIFICANT CHANGE UP (ref 1.8–7.4)
NEUTROPHILS NFR BLD AUTO: 56.7 % — SIGNIFICANT CHANGE UP (ref 43–77)
NRBC # BLD: 0 /100 WBCS — SIGNIFICANT CHANGE UP (ref 0–0)
PLATELET # BLD AUTO: 274 K/UL — SIGNIFICANT CHANGE UP (ref 150–400)
POTASSIUM SERPL-MCNC: 4.4 MMOL/L — SIGNIFICANT CHANGE UP (ref 3.5–5.3)
POTASSIUM SERPL-SCNC: 4.4 MMOL/L — SIGNIFICANT CHANGE UP (ref 3.5–5.3)
PROT SERPL-MCNC: 6.4 G/DL — SIGNIFICANT CHANGE UP (ref 6–8.3)
RBC # BLD: 3.71 M/UL — LOW (ref 4.2–5.8)
RBC # FLD: 17.7 % — HIGH (ref 10.3–14.5)
SODIUM SERPL-SCNC: 143 MMOL/L — SIGNIFICANT CHANGE UP (ref 135–145)
WBC # BLD: 5.74 K/UL — SIGNIFICANT CHANGE UP (ref 3.8–10.5)
WBC # FLD AUTO: 5.74 K/UL — SIGNIFICANT CHANGE UP (ref 3.8–10.5)

## 2024-06-04 ENCOUNTER — APPOINTMENT (OUTPATIENT)
Dept: INFUSION THERAPY | Facility: HOSPITAL | Age: 54
End: 2024-06-04

## 2024-06-04 ENCOUNTER — APPOINTMENT (OUTPATIENT)
Dept: HEMATOLOGY ONCOLOGY | Facility: CLINIC | Age: 54
End: 2024-06-04
Payer: MEDICAID

## 2024-06-04 VITALS
TEMPERATURE: 97.5 F | RESPIRATION RATE: 16 BRPM | HEART RATE: 70 BPM | SYSTOLIC BLOOD PRESSURE: 108 MMHG | DIASTOLIC BLOOD PRESSURE: 67 MMHG | OXYGEN SATURATION: 95 %

## 2024-06-04 LAB — T4 FREE+ TSH PNL SERPL: 0.42 UIU/ML — SIGNIFICANT CHANGE UP (ref 0.27–4.2)

## 2024-06-04 PROCEDURE — G2211 COMPLEX E/M VISIT ADD ON: CPT | Mod: NC,1L

## 2024-06-04 PROCEDURE — 99214 OFFICE O/P EST MOD 30 MIN: CPT

## 2024-06-04 NOTE — HISTORY OF PRESENT ILLNESS
[de-identified] : 53-year-old male smoker recently [place] [facility] [place] ([place]. Avalos) with small cell lung cancer.   3/5/2024-FNA of right neck mass positive for malignant cells.  Small cell carcinoma with necrosis. 3/14/2024-PET/CT scan-enlarged cervical and thoracic lymphadenopathy, minimally FDG avid right upper lobe nodule.  4/1/2024-CT brain-negative for metastases (unable to have MRI due to metal which she has from prior accident, not compatible).  Indeterminate sclerotic and expansile lesion in the right frontal calvarium.  Bone scan may be performed to further evaluate. It was recommended that he receive chemotherapy/immunotherapy.  He initially opted to pursue homeopathic remedies and was not interested in standard of care chemoimmunotherapy.  4/15/2024-Then patient presented to [facility] with shortness of breath.  No chest pain, hemoptysis or lower extremity edema.  He has a mild productive cough. 4/15/2024-CT angiogram of the chest-5.3 x 5.1 cm right paramediastinal mass with extension into the mediastinum.  Increased soft tissue density in the mediastinum and sreekanth, causing obstruction of the right upper lobe bronchus and pulmonary artery and significant narrowing of the right mainstem bronchus and left inferior pulmonary vein.  Axillary lymphadenopathy, likely metastatic.  4/19/2024-4/21/2024-Received first cycle of etoposide/carboplatin as inpatient at [facility]. Added Atezolizumab with cycle #2 as outpatient.   [de-identified] : Small cell carcinoma [de-identified] : States is feeling well. Taking medical marijuana-helps with appetite. No current c/o SOB, CP, cough, hemoptysis. No H/A or bone pain. No N/V. Erythromycin Counseling:  I discussed with the patient the risks of erythromycin including but not limited to GI upset, allergic reaction, drug rash, diarrhea, increase in liver enzymes, and yeast infections.

## 2024-06-04 NOTE — ASSESSMENT
[FreeTextEntry1] : Lab work reviewed.  Clinically extensive small cell lung cancer. -- With extensive small cell lung cancer, recommended chemoimmunotherapy.  Patient had initially declined standard therapy, wishing to pursue homeopathic treatments.  However, then agreed to chemotherapy/immunotherapy recommended. 4/19/2024-4/21/2024-Received first cycle of etoposide/carboplatin as inpatient at [facility].  Atezolizumab added with cycle #2 as outpatient. -- To consider radiation should progressive symptoms develop (radiation oncology consulted during initial hospitalization and did not recommend RT at that time). T/C bronchial stent placement before RT, as well if needed, pending course. --recommended WBBS in light of head CT results-patient declined having this done initially, wishing to defer until his next set of scans --clinically stable for treatment today  Patient was given the opportunity to ask questions. His questions have been answered to his apparent satisfaction at this time.  -->Etoposide 100 mg/m IV days 1, 2, 3 along with Carboplatin AUC 5-day 1 and Atezolizumab 1200 mg IV day 1.  Cycle is to be repeated every 21 days x total of 4 cycles (first cycle given in the hospital).  Fulphila support. Then to be reevaluated for further treatment, possible maintenance atezolizumab 1200 mg IV day 1 every 21 days until progression of disease or unacceptable toxicity.  Cycle #3, Day#2 treatment today.

## 2024-06-05 ENCOUNTER — APPOINTMENT (OUTPATIENT)
Dept: INFUSION THERAPY | Facility: HOSPITAL | Age: 54
End: 2024-06-05

## 2024-06-06 ENCOUNTER — APPOINTMENT (OUTPATIENT)
Dept: INFUSION THERAPY | Facility: HOSPITAL | Age: 54
End: 2024-06-06

## 2024-06-20 ENCOUNTER — OUTPATIENT (OUTPATIENT)
Dept: OUTPATIENT SERVICES | Facility: HOSPITAL | Age: 54
LOS: 1 days | Discharge: HOME | End: 2024-06-20

## 2024-06-20 DIAGNOSIS — C34.90 MALIGNANT NEOPLASM OF UNSPECIFIED PART OF UNSPECIFIED BRONCHUS OR LUNG: ICD-10-CM

## 2024-06-23 ENCOUNTER — NON-APPOINTMENT (OUTPATIENT)
Age: 54
End: 2024-06-23

## 2024-06-24 ENCOUNTER — RESULT REVIEW (OUTPATIENT)
Age: 54
End: 2024-06-24

## 2024-06-24 ENCOUNTER — APPOINTMENT (OUTPATIENT)
Dept: INFUSION THERAPY | Facility: HOSPITAL | Age: 54
End: 2024-06-24

## 2024-06-24 ENCOUNTER — APPOINTMENT (OUTPATIENT)
Dept: HEMATOLOGY ONCOLOGY | Facility: CLINIC | Age: 54
End: 2024-06-24

## 2024-06-24 LAB
ALBUMIN SERPL ELPH-MCNC: 4.3 G/DL — SIGNIFICANT CHANGE UP (ref 3.3–5)
ALP SERPL-CCNC: 60 U/L — SIGNIFICANT CHANGE UP (ref 40–120)
ALT FLD-CCNC: 10 U/L — SIGNIFICANT CHANGE UP (ref 10–45)
ANION GAP SERPL CALC-SCNC: 12 MMOL/L — SIGNIFICANT CHANGE UP (ref 5–17)
AST SERPL-CCNC: 25 U/L — SIGNIFICANT CHANGE UP (ref 10–40)
BASOPHILS # BLD AUTO: 0.02 K/UL — SIGNIFICANT CHANGE UP (ref 0–0.2)
BASOPHILS NFR BLD AUTO: 0.3 % — SIGNIFICANT CHANGE UP (ref 0–2)
BILIRUB SERPL-MCNC: 0.3 MG/DL — SIGNIFICANT CHANGE UP (ref 0.2–1.2)
BUN SERPL-MCNC: 13 MG/DL — SIGNIFICANT CHANGE UP (ref 7–23)
CALCIUM SERPL-MCNC: 9.7 MG/DL — SIGNIFICANT CHANGE UP (ref 8.4–10.5)
CHLORIDE SERPL-SCNC: 104 MMOL/L — SIGNIFICANT CHANGE UP (ref 96–108)
CO2 SERPL-SCNC: 25 MMOL/L — SIGNIFICANT CHANGE UP (ref 22–31)
CREAT SERPL-MCNC: 0.82 MG/DL — SIGNIFICANT CHANGE UP (ref 0.5–1.3)
EGFR: 106 ML/MIN/1.73M2 — SIGNIFICANT CHANGE UP
EOSINOPHIL # BLD AUTO: 0.03 K/UL — SIGNIFICANT CHANGE UP (ref 0–0.5)
EOSINOPHIL NFR BLD AUTO: 0.5 % — SIGNIFICANT CHANGE UP (ref 0–6)
GLUCOSE SERPL-MCNC: 92 MG/DL — SIGNIFICANT CHANGE UP (ref 70–99)
HCT VFR BLD CALC: 34.9 % — LOW (ref 39–50)
HGB BLD-MCNC: 12.1 G/DL — LOW (ref 13–17)
IMM GRANULOCYTES NFR BLD AUTO: 0.5 % — SIGNIFICANT CHANGE UP (ref 0–0.9)
LYMPHOCYTES # BLD AUTO: 2.23 K/UL — SIGNIFICANT CHANGE UP (ref 1–3.3)
LYMPHOCYTES # BLD AUTO: 34.8 % — SIGNIFICANT CHANGE UP (ref 13–44)
MAGNESIUM SERPL-MCNC: 1.9 MG/DL — SIGNIFICANT CHANGE UP (ref 1.6–2.6)
MCHC RBC-ENTMCNC: 32.9 PG — SIGNIFICANT CHANGE UP (ref 27–34)
MCHC RBC-ENTMCNC: 34.7 G/DL — SIGNIFICANT CHANGE UP (ref 32–36)
MCV RBC AUTO: 94.8 FL — SIGNIFICANT CHANGE UP (ref 80–100)
MONOCYTES # BLD AUTO: 0.62 K/UL — SIGNIFICANT CHANGE UP (ref 0–0.9)
MONOCYTES NFR BLD AUTO: 9.7 % — SIGNIFICANT CHANGE UP (ref 2–14)
NEUTROPHILS # BLD AUTO: 3.48 K/UL — SIGNIFICANT CHANGE UP (ref 1.8–7.4)
NEUTROPHILS NFR BLD AUTO: 54.2 % — SIGNIFICANT CHANGE UP (ref 43–77)
NRBC # BLD: 0 /100 WBCS — SIGNIFICANT CHANGE UP (ref 0–0)
PLATELET # BLD AUTO: 231 K/UL — SIGNIFICANT CHANGE UP (ref 150–400)
POTASSIUM SERPL-MCNC: 4.4 MMOL/L — SIGNIFICANT CHANGE UP (ref 3.5–5.3)
POTASSIUM SERPL-SCNC: 4.4 MMOL/L — SIGNIFICANT CHANGE UP (ref 3.5–5.3)
PROT SERPL-MCNC: 6.6 G/DL — SIGNIFICANT CHANGE UP (ref 6–8.3)
RBC # BLD: 3.68 M/UL — LOW (ref 4.2–5.8)
RBC # FLD: 17.2 % — HIGH (ref 10.3–14.5)
SODIUM SERPL-SCNC: 142 MMOL/L — SIGNIFICANT CHANGE UP (ref 135–145)
T4 FREE+ TSH PNL SERPL: 1.27 UIU/ML — SIGNIFICANT CHANGE UP (ref 0.27–4.2)
WBC # BLD: 6.41 K/UL — SIGNIFICANT CHANGE UP (ref 3.8–10.5)
WBC # FLD AUTO: 6.41 K/UL — SIGNIFICANT CHANGE UP (ref 3.8–10.5)

## 2024-06-25 ENCOUNTER — APPOINTMENT (OUTPATIENT)
Dept: INFUSION THERAPY | Facility: HOSPITAL | Age: 54
End: 2024-06-25

## 2024-06-25 DIAGNOSIS — R11.2 NAUSEA WITH VOMITING, UNSPECIFIED: ICD-10-CM

## 2024-06-25 DIAGNOSIS — Z51.11 ENCOUNTER FOR ANTINEOPLASTIC CHEMOTHERAPY: ICD-10-CM

## 2024-06-25 PROBLEM — C34.90 SMALL CELL LUNG CANCER: Status: ACTIVE | Noted: 2024-04-24

## 2024-06-26 ENCOUNTER — APPOINTMENT (OUTPATIENT)
Dept: INFUSION THERAPY | Facility: HOSPITAL | Age: 54
End: 2024-06-26

## 2024-06-26 ENCOUNTER — APPOINTMENT (OUTPATIENT)
Dept: HEMATOLOGY ONCOLOGY | Facility: CLINIC | Age: 54
End: 2024-06-26
Payer: MEDICAID

## 2024-06-26 VITALS
DIASTOLIC BLOOD PRESSURE: 77 MMHG | RESPIRATION RATE: 16 BRPM | TEMPERATURE: 97.8 F | OXYGEN SATURATION: 98 % | HEART RATE: 76 BPM | SYSTOLIC BLOOD PRESSURE: 120 MMHG

## 2024-06-26 DIAGNOSIS — C34.90 MALIGNANT NEOPLASM OF UNSPECIFIED PART OF UNSPECIFIED BRONCHUS OR LUNG: ICD-10-CM

## 2024-06-26 DIAGNOSIS — Z51.11 ENCOUNTER FOR ANTINEOPLASTIC CHEMOTHERAPY: ICD-10-CM

## 2024-06-26 PROCEDURE — G2211 COMPLEX E/M VISIT ADD ON: CPT | Mod: NC,1L

## 2024-06-26 PROCEDURE — 99214 OFFICE O/P EST MOD 30 MIN: CPT

## 2024-06-26 RX ORDER — ALLOPURINOL 300 MG/1
300 TABLET ORAL DAILY
Qty: 30 | Refills: 2 | Status: ACTIVE | COMMUNITY
Start: 2024-05-13 | End: 1900-01-01

## 2024-06-27 ENCOUNTER — APPOINTMENT (OUTPATIENT)
Dept: INFUSION THERAPY | Facility: HOSPITAL | Age: 54
End: 2024-06-27

## 2024-07-08 ENCOUNTER — APPOINTMENT (OUTPATIENT)
Dept: PULMONOLOGY | Facility: CLINIC | Age: 54
End: 2024-07-08

## 2024-07-30 ENCOUNTER — APPOINTMENT (OUTPATIENT)
Dept: HEMATOLOGY ONCOLOGY | Facility: CLINIC | Age: 54
End: 2024-07-30

## 2024-07-30 DIAGNOSIS — Z51.11 ENCOUNTER FOR ANTINEOPLASTIC CHEMOTHERAPY: ICD-10-CM

## 2024-07-30 DIAGNOSIS — C34.90 MALIGNANT NEOPLASM OF UNSPECIFIED PART OF UNSPECIFIED BRONCHUS OR LUNG: ICD-10-CM

## 2024-07-30 NOTE — HISTORY OF PRESENT ILLNESS
[Disease: _____________________] : Disease: [unfilled] [de-identified] : 53-year-old male smoker [place] [facility] [place]val[place] with small cell lung cancer.   3/5/2024-FNA of right neck mass positive for malignant cells.  Small cell carcinoma with necrosis. 3/14/2024-PET/CT scan-enlarged cervical and thoracic lymphadenopathy, minimally FDG avid right upper lobe nodule.  4/1/2024-CT brain-negative for metastases (unable to have MRI due to metal which he has from prior accident, not compatible).  Indeterminate sclerotic and expansile lesion in the right frontal calvarium.  Bone scan may be performed to further evaluate. It was recommended that he receive chemotherapy/immunotherapy.  He initially opted to pursue homeopathic remedies and was not interested in standard of care chemoimmunotherapy.  4/15/2024-Then patient presented to [facility] with shortness of breath.  No chest pain, hemoptysis or lower extremity edema.  He has a mild productive cough. 4/15/2024-CT angiogram of the chest-5.3 x 5.1 cm right paramediastinal mass with extension into the mediastinum.  Increased soft tissue density in the mediastinum and sreekanth, causing obstruction of the right upper lobe bronchus and pulmonary artery and significant narrowing of the right mainstem bronchus and left inferior pulmonary vein.  Axillary lymphadenopathy, likely metastatic.  4/19/2024-4/21/2024-Received first cycle of etoposide/carboplatin as inpatient at [facility]. Added Atezolizumab with cycle #2 as outpatient. 6/26/2024-Completed 4th cycle of Etoposide/Carboplatin, 3rd  cycle of Atezolizumab.  [de-identified] : Small cell carcinoma [de-identified] :   --Feels well.  Taking medical marijuana-helps with appetite. No current c/o SOB, CP, cough, hemoptysis. No H/A or bone pain. No N/V. No fevers.

## 2024-07-30 NOTE — PHYSICAL EXAM
[Restricted in physically strenuous activity but ambulatory and able to carry out work of a light or sedentary nature] : Status 1- Restricted in physically strenuous activity but ambulatory and able to carry out work of a light or sedentary nature, e.g., light house work, office work [Normal] : affect appropriate [de-identified] : no LAD appreciated on exam today

## 2024-07-30 NOTE — REASON FOR VISIT
This pt had 11 beats of Vtach late this afternoon. Pt was asymptomatic, no complaints of angina or palpitations, and resting comfortably in bed at the time. K of 4.1 and Mag of 1.8. Strip saved to EMR.     Dr. Bledsoe was paged about Vtach. RN ordered to CTM. Will CTM.      [Initial Consultation] : an initial consultation [Spouse] : spouse [FreeTextEntry2] : SCLC

## 2024-07-30 NOTE — ASSESSMENT
[FreeTextEntry1] : Clinically extensive small cell lung cancer. -- With extensive small cell lung cancer, recommended chemoimmunotherapy.  Patient had initially declined standard therapy, wishing to pursue homeopathic treatments.  However, then agreed to chemotherapy/immunotherapy recommended. 4/19/2024-4/21/2024-Received first cycle of etoposide/carboplatin as inpatient at [facility].  Atezolizumab added with cycle #2 as outpatient. -- To consider radiation should progressive symptoms develop (radiation oncology consulted during initial hospitalization and did not recommend RT at that time). T/C bronchial stent placement before RT, as well if needed, pending course. --recommended WBBS in light of head CT results-patient declined having this done initially, wishing to defer until  6/26/2024-Completed 4th cycle of Etoposide/Carboplatin with #3 Atezolizumab. --f/u CT C/A/P, WBBS, CT head were ordered-pending -- Explained to patient possible continued maintenance atezolizumab therapy pending scan results.  He expressed his understanding of recommendations, however, has stated he may elect for no further systemic therapy, but focus on a holistic/natural approach to his management.  He stated he will have scans done and will make a follow-up appointment to further discuss.  Patient was given the opportunity to ask questions. His questions have been answered to his apparent satisfaction at this time.  -->Etoposide 100 mg/m IV days 1, 2, 3 along with Carboplatin AUC 5-day 1 and Atezolizumab 1200 mg IV day 1.  Cycle is to be repeated every 21 days x total of 4 cycles (first cycle given in the hospital).  Fulphila support. Pending f/u imaging, t/c possible maintenance atezolizumab 1200 mg IV day 1 every 21 days until progression of disease or unacceptable toxicity.   RTO 3 weeks.

## 2024-08-02 ENCOUNTER — APPOINTMENT (OUTPATIENT)
Dept: NUCLEAR MEDICINE | Facility: CLINIC | Age: 54
End: 2024-08-02
Payer: MEDICAID

## 2024-08-02 ENCOUNTER — APPOINTMENT (OUTPATIENT)
Dept: CT IMAGING | Facility: CLINIC | Age: 54
End: 2024-08-02

## 2024-08-02 ENCOUNTER — RESULT REVIEW (OUTPATIENT)
Age: 54
End: 2024-08-02

## 2024-08-02 ENCOUNTER — OUTPATIENT (OUTPATIENT)
Dept: OUTPATIENT SERVICES | Facility: HOSPITAL | Age: 54
LOS: 1 days | End: 2024-08-02

## 2024-08-02 DIAGNOSIS — C34.90 MALIGNANT NEOPLASM OF UNSPECIFIED PART OF UNSPECIFIED BRONCHUS OR LUNG: ICD-10-CM

## 2024-08-02 PROCEDURE — 70470 CT HEAD/BRAIN W/O & W/DYE: CPT | Mod: 26

## 2024-08-02 PROCEDURE — 78306 BONE IMAGING WHOLE BODY: CPT | Mod: 26

## 2024-08-02 PROCEDURE — 78830 RP LOCLZJ TUM SPECT W/CT 1: CPT | Mod: 26

## 2024-08-02 PROCEDURE — 71260 CT THORAX DX C+: CPT | Mod: 26

## 2024-08-02 PROCEDURE — 74177 CT ABD & PELVIS W/CONTRAST: CPT | Mod: 26

## 2024-08-06 ENCOUNTER — NON-APPOINTMENT (OUTPATIENT)
Age: 54
End: 2024-08-06

## 2024-08-16 ENCOUNTER — OUTPATIENT (OUTPATIENT)
Dept: OUTPATIENT SERVICES | Facility: HOSPITAL | Age: 54
LOS: 1 days | Discharge: HOME | End: 2024-08-16

## 2024-08-16 DIAGNOSIS — C34.90 MALIGNANT NEOPLASM OF UNSPECIFIED PART OF UNSPECIFIED BRONCHUS OR LUNG: ICD-10-CM
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
## 2024-08-28 ENCOUNTER — APPOINTMENT (OUTPATIENT)
Dept: HEMATOLOGY ONCOLOGY | Facility: CLINIC | Age: 54
End: 2024-08-28
Payer: MEDICAID

## 2024-08-28 VITALS
BODY MASS INDEX: 24.84 KG/M2 | HEART RATE: 85 BPM | WEIGHT: 188.25 LBS | RESPIRATION RATE: 16 BRPM | SYSTOLIC BLOOD PRESSURE: 101 MMHG | DIASTOLIC BLOOD PRESSURE: 69 MMHG | OXYGEN SATURATION: 98 % | TEMPERATURE: 98.7 F

## 2024-08-28 DIAGNOSIS — Z51.11 ENCOUNTER FOR ANTINEOPLASTIC CHEMOTHERAPY: ICD-10-CM

## 2024-08-28 DIAGNOSIS — C34.90 MALIGNANT NEOPLASM OF UNSPECIFIED PART OF UNSPECIFIED BRONCHUS OR LUNG: ICD-10-CM

## 2024-08-28 PROCEDURE — G2211 COMPLEX E/M VISIT ADD ON: CPT | Mod: NC

## 2024-08-28 PROCEDURE — 99214 OFFICE O/P EST MOD 30 MIN: CPT

## 2024-08-28 NOTE — ASSESSMENT
[FreeTextEntry1] : Lab work, CT head, CT C/A/P report, WBBS report reviewed. Clinically extensive small cell lung cancer. -- With extensive small cell lung cancer, recommended chemoimmunotherapy.  Patient had initially declined standard therapy, wishing to pursue homeopathic treatments.  However, then agreed to chemotherapy/immunotherapy recommended. 4/19/2024-4/21/2024-Received first cycle of etoposide/carboplatin as inpatient at [facility].  Atezolizumab added with cycle #2 as outpatient, completing 4 cycles of systemic therapy 6/26/2024.  8/2/2024-CT C/A/P-Previously noted large mediastinal mass has markedly decreased in size with minimal right paratracheal and subcarinal soft tissue remaining. New right upper lobe nodules are indeterminate and short-term follow-up is recommended. New tree-in-bud nodularity in the left lower lobe laterally. This may be infectious in etiology. 8/2/20243761-FZNJ-Nntbnmce bone scan. Two foci of increased radiopharmaceutical uptake in the right skull. Metastatic disease cannot be excluded. For further evaluation consider MRI. 8/2/2024-CT head (patient unable to have MRI)-No acute intracranial hemorrhage, mass effect, or shift of the midline structures. No abnormal intracranial enhancement. The calvarium is intact.  --Discussed treatment options at this point.  Recommend continued therapy-to consider maintenance atezolizumab.  Have also discussed with patient consideration of follow-up radiation oncology input, as he appears to have had a good response to treatment thus far. I have explained the natural history of small cell lung cancer with likelihood for POD, and that his disease may not be as treatable if/when it progresses. Patient clearly expressed his understanding of recommended treatment, and refuses any systemic therapy at this time or consideration of radiation.  He stated he may reconsider treatment pending follow-up CT scan of the chest in a few weeks.  He does note that he favors a more holistic/natural approach to his management.  Patient was given the opportunity to ask questions. His questions have been answered to his apparent satisfaction at this time.  -->s/p Etoposide 100 mg/m IV days 1, 2, 3 along with Carboplatin AUC 5-day 1 and Atezolizumab 1200 mg IV day 1.  Cycle repeated every 21 days x total of 4 cycles (first cycle given in the hospital).  Fulphila support.  Now suggested maintenance atezolizumab 1200 mg IV day 1 every 21 days until progression of disease or unacceptable toxicity-patient refusing at present. RTO 3-4 weeks.

## 2024-08-28 NOTE — PHYSICAL EXAM
[Restricted in physically strenuous activity but ambulatory and able to carry out work of a light or sedentary nature] : Status 1- Restricted in physically strenuous activity but ambulatory and able to carry out work of a light or sedentary nature, e.g., light house work, office work [Normal] : affect appropriate [Fully active, able to carry on all pre-disease performance without restriction] : Status 0 - Fully active, able to carry on all pre-disease performance without restriction [de-identified] : no LAD appreciated on exam today

## 2024-08-28 NOTE — PHYSICAL EXAM
[Restricted in physically strenuous activity but ambulatory and able to carry out work of a light or sedentary nature] : Status 1- Restricted in physically strenuous activity but ambulatory and able to carry out work of a light or sedentary nature, e.g., light house work, office work [Normal] : affect appropriate [Fully active, able to carry on all pre-disease performance without restriction] : Status 0 - Fully active, able to carry on all pre-disease performance without restriction [de-identified] : no LAD appreciated on exam today

## 2024-08-28 NOTE — PHYSICAL EXAM
[Restricted in physically strenuous activity but ambulatory and able to carry out work of a light or sedentary nature] : Status 1- Restricted in physically strenuous activity but ambulatory and able to carry out work of a light or sedentary nature, e.g., light house work, office work [Normal] : affect appropriate [Fully active, able to carry on all pre-disease performance without restriction] : Status 0 - Fully active, able to carry on all pre-disease performance without restriction [de-identified] : no LAD appreciated on exam today

## 2024-08-28 NOTE — ASSESSMENT
[FreeTextEntry1] : Lab work, CT head, CT C/A/P report, WBBS report reviewed. Clinically extensive small cell lung cancer. -- With extensive small cell lung cancer, recommended chemoimmunotherapy.  Patient had initially declined standard therapy, wishing to pursue homeopathic treatments.  However, then agreed to chemotherapy/immunotherapy recommended. 4/19/2024-4/21/2024-Received first cycle of etoposide/carboplatin as inpatient at [facility].  Atezolizumab added with cycle #2 as outpatient, completing 4 cycles of systemic therapy 6/26/2024.  8/2/2024-CT C/A/P-Previously noted large mediastinal mass has markedly decreased in size with minimal right paratracheal and subcarinal soft tissue remaining. New right upper lobe nodules are indeterminate and short-term follow-up is recommended. New tree-in-bud nodularity in the left lower lobe laterally. This may be infectious in etiology. 8/2/20244936-IZOY-Ultymydx bone scan. Two foci of increased radiopharmaceutical uptake in the right skull. Metastatic disease cannot be excluded. For further evaluation consider MRI. 8/2/2024-CT head (patient unable to have MRI)-No acute intracranial hemorrhage, mass effect, or shift of the midline structures. No abnormal intracranial enhancement. The calvarium is intact.  --Discussed treatment options at this point.  Recommend continued therapy-to consider maintenance atezolizumab.  Have also discussed with patient consideration of follow-up radiation oncology input, as he appears to have had a good response to treatment thus far. I have explained the natural history of small cell lung cancer with likelihood for POD, and that his disease may not be as treatable if/when it progresses. Patient clearly expressed his understanding of recommended treatment, and refuses any systemic therapy at this time or consideration of radiation.  He stated he may reconsider treatment pending follow-up CT scan of the chest in a few weeks.  He does note that he favors a more holistic/natural approach to his management.  Patient was given the opportunity to ask questions. His questions have been answered to his apparent satisfaction at this time.  -->s/p Etoposide 100 mg/m IV days 1, 2, 3 along with Carboplatin AUC 5-day 1 and Atezolizumab 1200 mg IV day 1.  Cycle repeated every 21 days x total of 4 cycles (first cycle given in the hospital).  Fulphila support.  Now suggested maintenance atezolizumab 1200 mg IV day 1 every 21 days until progression of disease or unacceptable toxicity-patient refusing at present. RTO 3-4 weeks.

## 2024-08-28 NOTE — HISTORY OF PRESENT ILLNESS
[Disease: _____________________] : Disease: [unfilled] [de-identified] : 53-year-old male smoker [place] [facility] [place]val[place] with small cell lung cancer.   3/5/2024-FNA of right neck mass positive for malignant cells.  Small cell carcinoma with necrosis. 3/14/2024-PET/CT scan-enlarged cervical and thoracic lymphadenopathy, minimally FDG avid right upper lobe nodule.  4/1/2024-CT brain-negative for metastases (unable to have MRI due to metal which he has from prior accident, not compatible).  Indeterminate sclerotic and expansile lesion in the right frontal calvarium.  Bone scan may be performed to further evaluate. It was recommended that he receive chemotherapy/immunotherapy.  He initially opted to pursue homeopathic remedies and was not interested in standard of care chemoimmunotherapy.  4/15/2024-Then patient presented to [facility] with shortness of breath.  No chest pain, hemoptysis or lower extremity edema.  He has a mild productive cough. 4/15/2024-CT angiogram of the chest-5.3 x 5.1 cm right paramediastinal mass with extension into the mediastinum.  Increased soft tissue density in the mediastinum and sreekanth, causing obstruction of the right upper lobe bronchus and pulmonary artery and significant narrowing of the right mainstem bronchus and left inferior pulmonary vein.  Axillary lymphadenopathy, likely metastatic.  4/19/2024-4/21/2024-Received first cycle of etoposide/carboplatin as inpatient at [facility]. Added Atezolizumab with cycle #2 as outpatient, and completed 4 cycles of systemic therapy 6/26/2024.  8/2/2024-CT C/A/P-Previously noted large mediastinal mass has markedly decreased in size with minimal right paratracheal and subcarinal soft tissue remaining. New right upper lobe nodules are indeterminate and short-term follow-up is recommended. New tree-in-bud nodularity in the left lower lobe laterally. This may be infectious in etiology. 8/2/20245387-[id number]-Itxqzvvs bone scan. Two foci of increased radiopharmaceutical uptake in the right skull. Metastatic disease cannot be excluded. For further evaluation consider MRI. 8/2/2024-CT head-No acute intracranial hemorrhage, mass effect, or shift of the midline structures. No abnormal intracranial enhancement. The calvarium is intact.  [de-identified] : Small cell carcinoma [de-identified] : Feels very well. Working, active. Taking medical marijuana-helps with appetite. No current c/o SOB, CP, cough, hemoptysis. No H/A or bone pain. No N/V. No fevers.

## 2024-08-28 NOTE — HISTORY OF PRESENT ILLNESS
[Disease: _____________________] : Disease: [unfilled] [de-identified] : 53-year-old male smoker [place] [facility] [place] with small cell lung cancer.   3/5/2024-FNA of right neck mass positive for malignant cells.  Small cell carcinoma with necrosis. 3/14/2024-PET/CT scan-enlarged cervical and thoracic lymphadenopathy, minimally FDG avid right upper lobe nodule.  4/1/2024-CT brain-negative for metastases (unable to have MRI due to metal which he has from prior accident, not compatible).  Indeterminate sclerotic and expansile lesion in the right frontal calvarium.  Bone scan may be performed to further evaluate. It was recommended that he receive chemotherapy/immunotherapy.  He initially opted to pursue homeopathic remedies and was not interested in standard of care chemoimmunotherapy.  4/15/2024-Then patient presented to [facility] with shortness of breath.  No chest pain, hemoptysis or lower extremity edema.  He has a mild productive cough. 4/15/2024-CT angiogram of the chest-5.3 x 5.1 cm right paramediastinal mass with extension into the mediastinum.  Increased soft tissue density in the mediastinum and sreekanth, causing obstruction of the right upper lobe bronchus and pulmonary artery and significant narrowing of the right mainstem bronchus and left inferior pulmonary vein.  Axillary lymphadenopathy, likely metastatic.  4/19/2024-4/21/2024-Received first cycle of etoposide/carboplatin as inpatient at [facility]. Added Atezolizumab with cycle #2 as outpatient, and completed 4 cycles of systemic therapy 6/26/2024.  8/2/2024-CT C/A/P-Previously noted large mediastinal mass has markedly decreased in size with minimal right paratracheal and subcarinal soft tissue remaining. New right upper lobe nodules are indeterminate and short-term follow-up is recommended. New tree-in-bud nodularity in the left lower lobe laterally. This may be infectious in etiology. 8/2/20245999-AHSI-Osawrtlg bone scan. Two foci of increased radiopharmaceutical uptake in the right skull. Metastatic disease cannot be excluded. For further evaluation consider MRI. 8/2/2024-CT head-No acute intracranial hemorrhage, mass effect, or shift of the midline structures. No abnormal intracranial enhancement. The calvarium is intact.  [de-identified] : Small cell carcinoma [de-identified] : Feels very well. Working, active. Taking medical marijuana-helps with appetite. No current c/o SOB, CP, cough, hemoptysis. No H/A or bone pain. No N/V. No fevers.

## 2024-08-28 NOTE — ASSESSMENT
[FreeTextEntry1] : Lab work, CT head, CT C/A/P report, WBBS report reviewed. Clinically extensive small cell lung cancer. -- With extensive small cell lung cancer, recommended chemoimmunotherapy.  Patient had initially declined standard therapy, wishing to pursue homeopathic treatments.  However, then agreed to chemotherapy/immunotherapy recommended. 4/19/2024-4/21/2024-Received first cycle of etoposide/carboplatin as inpatient at [facility].  Atezolizumab added with cycle #2 as outpatient, completing 4 cycles of systemic therapy 6/26/2024.  8/2/2024-CT C/A/P-Previously noted large mediastinal mass has markedly decreased in size with minimal right paratracheal and subcarinal soft tissue remaining. New right upper lobe nodules are indeterminate and short-term follow-up is recommended. New tree-in-bud nodularity in the left lower lobe laterally. This may be infectious in etiology. 8/2/20244345-[id number]-Vjjitvoi bone scan. Two foci of increased radiopharmaceutical uptake in the right skull. Metastatic disease cannot be excluded. For further evaluation consider MRI. 8/2/2024-CT head (patient unable to have MRI)-No acute intracranial hemorrhage, mass effect, or shift of the midline structures. No abnormal intracranial enhancement. The calvarium is intact.  --Discussed treatment options at this point.  Recommend continued therapy-to consider maintenance atezolizumab.  Have also discussed with patient consideration of follow-up radiation oncology input, as he appears to have had a good response to treatment thus far. I have explained the natural history of small cell lung cancer with likelihood for POD, and that his disease may not be as treatable if/when it progresses. Patient clearly expressed his understanding of recommended treatment, and refuses any systemic therapy at this time or consideration of radiation.  He stated he may reconsider treatment pending follow-up CT scan of the chest in a few weeks.  He does note that he favors a more holistic/natural approach to his management.  Patient was given the opportunity to ask questions. His questions have been answered to his apparent satisfaction at this time.  -->s/p Etoposide 100 mg/m IV days 1, 2, 3 along with Carboplatin AUC 5-day 1 and Atezolizumab 1200 mg IV day 1.  Cycle repeated every 21 days x total of 4 cycles (first cycle given in the hospital).  Fulphila support.  Now suggested maintenance atezolizumab 1200 mg IV day 1 every 21 days until progression of disease or unacceptable toxicity-patient refusing at present. RTO 3-4 weeks.

## 2024-08-28 NOTE — HISTORY OF PRESENT ILLNESS
[Disease: _____________________] : Disease: [unfilled] [de-identified] : 53-year-old male smoker recently diagnosed at [facility] [place] (Dr. Avalos) with small cell lung cancer.   3/5/2024-FNA of right neck mass positive for malignant cells.  Small cell carcinoma with necrosis. 3/14/2024-PET/CT scan-enlarged cervical and thoracic lymphadenopathy, minimally FDG avid right upper lobe nodule.  4/1/2024-CT brain-negative for metastases (unable to have MRI due to metal which he has from prior accident, not compatible).  Indeterminate sclerotic and expansile lesion in the right frontal calvarium.  Bone scan may be performed to further evaluate. It was recommended that he receive chemotherapy/immunotherapy.  He initially opted to pursue homeopathic remedies and was not interested in standard of care chemoimmunotherapy.  4/15/2024-Then patient presented to [facility] with shortness of breath.  No chest pain, hemoptysis or lower extremity edema.  He has a mild productive cough. 4/15/2024-CT angiogram of the chest-5.3 x 5.1 cm right paramediastinal mass with extension into the mediastinum.  Increased soft tissue density in the mediastinum and sreekanth, causing obstruction of the right upper lobe bronchus and pulmonary artery and significant narrowing of the right mainstem bronchus and left inferior pulmonary vein.  Axillary lymphadenopathy, likely metastatic.  4/19/2024-4/21/2024-Received first cycle of etoposide/carboplatin as inpatient at [facility]. Added Atezolizumab with cycle #2 as outpatient, and completed 4 cycles of systemic therapy 6/26/2024.  8/2/2024-CT C/A/P-Previously noted large mediastinal mass has markedly decreased in size with minimal right paratracheal and subcarinal soft tissue remaining. New right upper lobe nodules are indeterminate and short-term follow-up is recommended. New tree-in-bud nodularity in the left lower lobe laterally. This may be infectious in etiology. 8/2/20249730-WBER-Aqpndqzx bone scan. Two foci of increased radiopharmaceutical uptake in the right skull. Metastatic disease cannot be excluded. For further evaluation consider MRI. 8/2/2024-CT head-No acute intracranial hemorrhage, mass effect, or shift of the midline structures. No abnormal intracranial enhancement. The calvarium is intact.  [de-identified] : Small cell carcinoma [de-identified] : Feels very well. Working, active. Taking medical marijuana-helps with appetite. No current c/o SOB, CP, cough, hemoptysis. No H/A or bone pain. No N/V. No fevers.

## 2024-09-04 ENCOUNTER — NON-APPOINTMENT (OUTPATIENT)
Age: 54
End: 2024-09-04

## 2024-10-02 ENCOUNTER — NON-APPOINTMENT (OUTPATIENT)
Age: 54
End: 2024-10-02

## 2024-10-03 ENCOUNTER — APPOINTMENT (OUTPATIENT)
Dept: HEMATOLOGY ONCOLOGY | Facility: CLINIC | Age: 54
End: 2024-10-03
Payer: MEDICAID

## 2024-10-03 VITALS
RESPIRATION RATE: 17 BRPM | HEIGHT: 73 IN | DIASTOLIC BLOOD PRESSURE: 78 MMHG | TEMPERATURE: 98 F | SYSTOLIC BLOOD PRESSURE: 108 MMHG | HEART RATE: 91 BPM | OXYGEN SATURATION: 96 % | BODY MASS INDEX: 24.92 KG/M2 | WEIGHT: 188 LBS

## 2024-10-03 DIAGNOSIS — C34.90 MALIGNANT NEOPLASM OF UNSPECIFIED PART OF UNSPECIFIED BRONCHUS OR LUNG: ICD-10-CM

## 2024-10-03 PROCEDURE — 99214 OFFICE O/P EST MOD 30 MIN: CPT

## 2024-10-03 PROCEDURE — G2211 COMPLEX E/M VISIT ADD ON: CPT | Mod: NC

## 2024-10-03 NOTE — HISTORY OF PRESENT ILLNESS
[Disease: _____________________] : Disease: [unfilled] [de-identified] : 53-year-old male smoker [place] [facility] [place] with small cell lung cancer.   3/5/2024-FNA of right neck mass positive for malignant cells.  Small cell carcinoma with necrosis. 3/14/2024-PET/CT scan-enlarged cervical and thoracic lymphadenopathy, minimally FDG avid right upper lobe nodule.  4/1/2024-CT brain-negative for metastases (unable to have MRI due to metal which he has from prior accident, not compatible).  Indeterminate sclerotic and expansile lesion in the right frontal calvarium.  Bone scan may be performed to further evaluate. It was recommended that he receive chemotherapy/immunotherapy.  He initially opted to pursue homeopathic remedies and was not interested in standard of care chemoimmunotherapy.  4/15/2024-Then patient presented [facility] with [facility]ness of breath.  No chest pain, hemoptysis or lower extremity edema.  He has a mild productive cough. 4/15/2024-CT angiogram of the chest-5.3 x 5.1 cm right paramediastinal mass with extension into the mediastinum.  Increased soft tissue density in the mediastinum and sreekanth, causing obstruction of the right upper lobe bronchus and pulmonary artery and significant narrowing of the right mainstem bronchus and left inferior pulmonary vein.  Axillary lymphadenopathy, likely metastatic.  4/19/2024-4/21/2024-Received first cycle of etoposide/carboplatin as inpatient at [facility]. Added Atezolizumab with cycle #2 as outpatient, and completed 4 cycles of systemic therapy 6/26/2024.  8/2/2024-CT C/A/P-Previously noted large mediastinal mass has markedly decreased in size with minimal right paratracheal and subcarinal soft tissue remaining. New right upper lobe nodules are indeterminate and short-term follow-up is recommended. New tree-in-bud nodularity in the left lower lobe laterally. This may be infectious in etiology. 8/2/20246686-NFII-Cftfohox bone scan. Two foci of increased radiopharmaceutical uptake in the right skull. Metastatic disease cannot be excluded. For further evaluation consider MRI. 8/2/2024-CT head-No acute intracranial hemorrhage, mass effect, or shift of the midline structures. No abnormal intracranial enhancement. The calvarium is intact.  [de-identified] : Small cell carcinoma [de-identified] : 2-3 weeks of neck pain. No associated paresthesias or UE/LE weakness. Working, remaining active. Taking medical marijuana-helps with appetite. No current c/o SOB, CP, cough, hemoptysis. No H/A. No N/V. No fevers. Did not do PET scan as ordered-says his insurance told him it wasn't approved (advised him to call us next time). alone

## 2024-10-03 NOTE — PHYSICAL EXAM
[Fully active, able to carry on all pre-disease performance without restriction] : Status 0 - Fully active, able to carry on all pre-disease performance without restriction [Normal] : affect appropriate [de-identified] : no LAD appreciated on exam today

## 2024-10-03 NOTE — HISTORY OF PRESENT ILLNESS
[Disease: _____________________] : Disease: [unfilled] [de-identified] : 53-year-old male smoker [place] [facility] [place] with small cell lung cancer.   3/5/2024-FNA of right neck mass positive for malignant cells.  Small cell carcinoma with necrosis. 3/14/2024-PET/CT scan-enlarged cervical and thoracic lymphadenopathy, minimally FDG avid right upper lobe nodule.  4/1/2024-CT brain-negative for metastases (unable to have MRI due to metal which he has from prior accident, not compatible).  Indeterminate sclerotic and expansile lesion in the right frontal calvarium.  Bone scan may be performed to further evaluate. It was recommended that he receive chemotherapy/immunotherapy.  He initially opted to pursue homeopathic remedies and was not interested in standard of care chemoimmunotherapy.  4/15/2024-Then patient presented to [facility] with shortness of breath.  No chest pain, hemoptysis or lower extremity edema.  He has a mild productive cough. 4/15/2024-CT angiogram of the chest-5.3 x 5.1 cm right paramediastinal mass with extension into the mediastinum.  Increased soft tissue density in the mediastinum and sreekanth, causing obstruction of the right upper lobe bronchus and pulmonary artery and significant narrowing of the right mainstem bronchus and left inferior pulmonary vein.  Axillary lymphadenopathy, likely metastatic.  4/19/2024-4/21/2024-Received first cycle of etoposide/carboplatin as inpatient at [facility]. Added Atezolizumab with cycle #2 as outpatient, and completed 4 cycles of systemic therapy 6/26/2024.  8/2/2024-CT C/A/P-Previously noted large mediastinal mass has markedly decreased in size with minimal right paratracheal and subcarinal soft tissue remaining. New right upper lobe nodules are indeterminate and short-term follow-up is recommended. New tree-in-bud nodularity in the left lower lobe laterally. This may be infectious in etiology. 8/2/20246304-JGZG-Bfggfivt bone scan. Two foci of increased radiopharmaceutical uptake in the right skull. Metastatic disease cannot be excluded. For further evaluation consider MRI. 8/2/2024-CT head-No acute intracranial hemorrhage, mass effect, or shift of the midline structures. No abnormal intracranial enhancement. The calvarium is intact.  [de-identified] : Small cell carcinoma [de-identified] : 2-3 weeks of neck pain. No associated paresthesias or UE/LE weakness. Working, remaining active. Taking medical marijuana-helps with appetite. No current c/o SOB, CP, cough, hemoptysis. No H/A. No N/V. No fevers. Did not do PET scan as ordered-says his insurance told him it wasn't approved (advised him to call us next time).

## 2024-10-03 NOTE — PHYSICAL EXAM
[Fully active, able to carry on all pre-disease performance without restriction] : Status 0 - Fully active, able to carry on all pre-disease performance without restriction [Normal] : affect appropriate [de-identified] : no LAD appreciated on exam today

## 2024-10-03 NOTE — ASSESSMENT
[FreeTextEntry1] : Clinically extensive small cell lung cancer. -- With extensive small cell lung cancer, recommended chemoimmunotherapy.  Patient had initially declined standard therapy, wishing to pursue homeopathic treatments.  However, then agreed to chemotherapy/immunotherapy recommended. 4/19/2024-4/21/2024-Received first cycle of etoposide/carboplatin as inpatient at [facility].  Atezolizumab added with cycle #2 as outpatient, completing 4 cycles of systemic therapy 6/26/2024.  8/2/2024-CT C/A/P-Previously noted large mediastinal mass has markedly decreased in size with minimal right paratracheal and subcarinal soft tissue remaining. New right upper lobe nodules are indeterminate and short-term follow-up is recommended. New tree-in-bud nodularity in the left lower lobe laterally. This may be infectious in etiology. 8/2/20244314-SXOO-Drpnqnuo bone scan. Two foci of increased radiopharmaceutical uptake in the right skull. Metastatic disease cannot be excluded. For further evaluation consider MRI. 8/2/2024-CT head (patient unable to have MRI)-No acute intracranial hemorrhage, mass effect, or shift of the midline structures. No abnormal intracranial enhancement. The calvarium is intact. --Discussed treatment options at that point.  Recommended continued therapy-to consider maintenance atezolizumab.  Have also discussed with patient consideration of follow-up radiation oncology input, as he appearsed to have had a good response to treatment thus far. I have explained the natural history of small cell lung cancer with likelihood for POD, and that his disease may not be as treatable if/when it progresses. Patient clearly expressed his understanding of recommended treatment, and refused any systemic therapy at that time or consideration of radiation.  He noted that he favored a more holistic/natural approach to his management. --have re-ordered imaging-he's to schedule CT C/A/P and C-spine CT scan (t/c WBBS pending this). --again reviewed treatment options and recommendations for treatment-again patient refuses DMT at present  Patient was given the opportunity to ask questions. His questions have been answered to his apparent satisfaction at this time.  -->s/p Etoposide 100 mg/m IV days 1, 2, 3 along with Carboplatin AUC 5-day 1 and Atezolizumab 1200 mg IV day 1.  Cycle repeated every 21 days x total of 4 cycles (first cycle given in the hospital).  Fulphila support.  Suggested maintenance atezolizumab 1200 mg IV day 1 every 21 days until progression of disease or unacceptable toxicity-patient has refused further therapy thus far. RTO 3 months.

## 2024-10-03 NOTE — ASSESSMENT
[FreeTextEntry1] : Clinically extensive small cell lung cancer. -- With extensive small cell lung cancer, recommended chemoimmunotherapy.  Patient had initially declined standard therapy, wishing to pursue homeopathic treatments.  However, then agreed to chemotherapy/immunotherapy recommended. 4/19/2024-4/21/2024-Received first cycle of etoposide/carboplatin as inpatient at [facility].  Atezolizumab added with cycle #2 as outpatient, completing 4 cycles of systemic therapy 6/26/2024.  8/2/2024-CT C/A/P-Previously noted large mediastinal mass has markedly decreased in size with minimal right paratracheal and subcarinal soft tissue remaining. New right upper lobe nodules are indeterminate and short-term follow-up is recommended. New tree-in-bud nodularity in the left lower lobe laterally. This may be infectious in etiology. 8/2/20244315-TUHT-Hhzkuzog bone scan. Two foci of increased radiopharmaceutical uptake in the right skull. Metastatic disease cannot be excluded. For further evaluation consider MRI. 8/2/2024-CT head (patient unable to have MRI)-No acute intracranial hemorrhage, mass effect, or shift of the midline structures. No abnormal intracranial enhancement. The calvarium is intact. --Discussed treatment options at that point.  Recommended continued therapy-to consider maintenance atezolizumab.  Have also discussed with patient consideration of follow-up radiation oncology input, as he appearsed to have had a good response to treatment thus far. I have explained the natural history of small cell lung cancer with likelihood for POD, and that his disease may not be as treatable if/when it progresses. Patient clearly expressed his understanding of recommended treatment, and refused any systemic therapy at that time or consideration of radiation.  He noted that he favored a more holistic/natural approach to his management. --have re-ordered imaging-he's to schedule CT C/A/P and C-spine CT scan (t/c WBBS pending this). --again reviewed treatment options and recommendations for treatment-again patient refuses DMT at present  Patient was given the opportunity to ask questions. His questions have been answered to his apparent satisfaction at this time.  -->s/p Etoposide 100 mg/m IV days 1, 2, 3 along with Carboplatin AUC 5-day 1 and Atezolizumab 1200 mg IV day 1.  Cycle repeated every 21 days x total of 4 cycles (first cycle given in the hospital).  Fulphila support.  Suggested maintenance atezolizumab 1200 mg IV day 1 every 21 days until progression of disease or unacceptable toxicity-patient has refused further therapy thus far. RTO 3 months.

## 2024-10-03 NOTE — HISTORY OF PRESENT ILLNESS
[Disease: _____________________] : Disease: [unfilled] [de-identified] : 53-year-old male smoker [place] [facility] [place] with small cell lung cancer.   3/5/2024-FNA of right neck mass positive for malignant cells.  Small cell carcinoma with necrosis. 3/14/2024-PET/CT scan-enlarged cervical and thoracic lymphadenopathy, minimally FDG avid right upper lobe nodule.  4/1/2024-CT brain-negative for metastases (unable to have MRI due to metal which he has from prior accident, not compatible).  Indeterminate sclerotic and expansile lesion in the right frontal calvarium.  Bone scan may be performed to further evaluate. It was recommended that he receive chemotherapy/immunotherapy.  He initially opted to pursue homeopathic remedies and was not interested in standard of care chemoimmunotherapy.  4/15/2024-Then patient presented to [facility] with shortness of breath.  No chest pain, hemoptysis or lower extremity edema.  He has a mild productive cough. 4/15/2024-CT angiogram of the chest-5.3 x 5.1 cm right paramediastinal mass with extension into the mediastinum.  Increased soft tissue density in the mediastinum and sreekanth, causing obstruction of the right upper lobe bronchus and pulmonary artery and significant narrowing of the right mainstem bronchus and left inferior pulmonary vein.  Axillary lymphadenopathy, likely metastatic.  4/19/2024-4/21/2024-Received first cycle of etoposide/carboplatin as inpatient at [facility]. Added Atezolizumab with cycle #2 as outpatient, and completed 4 cycles of systemic therapy 6/26/2024.  8/2/2024-CT C/A/P-Previously noted large mediastinal mass has markedly decreased in size with minimal right paratracheal and subcarinal soft tissue remaining. New right upper lobe nodules are indeterminate and short-term follow-up is recommended. New tree-in-bud nodularity in the left lower lobe laterally. This may be infectious in etiology. 8/2/20247991-NLJW-Xnymtslv bone scan. Two foci of increased radiopharmaceutical uptake in the right skull. Metastatic disease cannot be excluded. For further evaluation consider MRI. 8/2/2024-CT head-No acute intracranial hemorrhage, mass effect, or shift of the midline structures. No abnormal intracranial enhancement. The calvarium is intact.  [de-identified] : Small cell carcinoma [de-identified] : 2-3 weeks of neck pain. No associated paresthesias or UE/LE weakness. Working, remaining active. Taking medical marijuana-helps with appetite. No current c/o SOB, CP, cough, hemoptysis. No H/A. No N/V. No fevers. Did not do PET scan as ordered-says his insurance told him it wasn't approved (advised him to call us next time).

## 2024-10-03 NOTE — ASSESSMENT
[FreeTextEntry1] : Clinically extensive small cell lung cancer. -- With extensive small cell lung cancer, recommended chemoimmunotherapy.  Patient had initially declined standard therapy, wishing to pursue homeopathic treatments.  However, then agreed to chemotherapy/immunotherapy recommended. 4/19/2024-4/21/2024-Received first cycle of etoposide/carboplatin as inpatient at [facility].  Atezolizumab added with cycle #2 as outpatient, completing 4 cycles of systemic therapy 6/26/2024.  8/2/2024-CT C/A/P-Previously noted large mediastinal mass has markedly decreased in size with minimal right paratracheal and subcarinal soft tissue remaining. New right upper lobe nodules are indeterminate and short-term follow-up is recommended. New tree-in-bud nodularity in the left lower lobe laterally. This may be infectious in etiology. 8/2/20245337-MEGH-Afqoqles bone scan. Two foci of increased radiopharmaceutical uptake in the right skull. Metastatic disease cannot be excluded. For further evaluation consider MRI. 8/2/2024-CT head (patient unable to have MRI)-No acute intracranial hemorrhage, mass effect, or shift of the midline structures. No abnormal intracranial enhancement. The calvarium is intact. --Discussed treatment options at that point.  Recommended continued therapy-to consider maintenance atezolizumab.  Have also discussed with patient consideration of follow-up radiation oncology input, as he appearsed to have had a good response to treatment thus far. I have explained the natural history of small cell lung cancer with likelihood for POD, and that his disease may not be as treatable if/when it progresses. Patient clearly expressed his understanding of recommended treatment, and refused any systemic therapy at that time or consideration of radiation.  He noted that he favored a more holistic/natural approach to his management. --have re-ordered imaging-he's to schedule CT C/A/P and C-spine CT scan (t/c WBBS pending this). --again reviewed treatment options and recommendations for treatment-again patient refuses DMT at present  Patient was given the opportunity to ask questions. His questions have been answered to his apparent satisfaction at this time.  -->s/p Etoposide 100 mg/m IV days 1, 2, 3 along with Carboplatin AUC 5-day 1 and Atezolizumab 1200 mg IV day 1.  Cycle repeated every 21 days x total of 4 cycles (first cycle given in the hospital).  Fulphila support.  Suggested maintenance atezolizumab 1200 mg IV day 1 every 21 days until progression of disease or unacceptable toxicity-patient has refused further therapy thus far. RTO 3 months.

## 2024-10-03 NOTE — PHYSICAL EXAM
[Fully active, able to carry on all pre-disease performance without restriction] : Status 0 - Fully active, able to carry on all pre-disease performance without restriction [Normal] : affect appropriate [de-identified] : no LAD appreciated on exam today

## 2024-10-16 ENCOUNTER — NON-APPOINTMENT (OUTPATIENT)
Age: 54
End: 2024-10-16

## 2024-10-24 ENCOUNTER — APPOINTMENT (OUTPATIENT)
Dept: NUCLEAR MEDICINE | Facility: CLINIC | Age: 54
End: 2024-10-24

## 2024-10-24 ENCOUNTER — OUTPATIENT (OUTPATIENT)
Dept: OUTPATIENT SERVICES | Facility: HOSPITAL | Age: 54
LOS: 1 days | End: 2024-10-24
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
## 2024-10-24 DIAGNOSIS — C34.90 MALIGNANT NEOPLASM OF UNSPECIFIED PART OF UNSPECIFIED BRONCHUS OR LUNG: ICD-10-CM

## 2024-10-24 PROCEDURE — 78816 PET IMAGE W/CT FULL BODY: CPT | Mod: 26,PS

## 2024-10-25 ENCOUNTER — NON-APPOINTMENT (OUTPATIENT)
Age: 54
End: 2024-10-25

## 2024-10-31 ENCOUNTER — OUTPATIENT (OUTPATIENT)
Dept: OUTPATIENT SERVICES | Facility: HOSPITAL | Age: 54
LOS: 1 days | End: 2024-10-31
Payer: MEDICAID

## 2024-10-31 ENCOUNTER — APPOINTMENT (OUTPATIENT)
Dept: CT IMAGING | Facility: CLINIC | Age: 54
End: 2024-10-31
Payer: MEDICAID
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
## 2024-10-31 DIAGNOSIS — C34.90 MALIGNANT NEOPLASM OF UNSPECIFIED PART OF UNSPECIFIED BRONCHUS OR LUNG: ICD-10-CM

## 2024-10-31 PROCEDURE — 70470 CT HEAD/BRAIN W/O & W/DYE: CPT | Mod: 26

## 2024-10-31 PROCEDURE — 70470 CT HEAD/BRAIN W/O & W/DYE: CPT

## 2024-11-04 PROBLEM — R49.0 HOARSENESS: Status: ACTIVE | Noted: 2024-11-04

## 2024-11-05 ENCOUNTER — RESULT REVIEW (OUTPATIENT)
Age: 54
End: 2024-11-05

## 2024-11-05 ENCOUNTER — APPOINTMENT (OUTPATIENT)
Dept: HEMATOLOGY ONCOLOGY | Facility: CLINIC | Age: 54
End: 2024-11-05
Payer: MEDICAID

## 2024-11-05 VITALS
WEIGHT: 176.37 LBS | RESPIRATION RATE: 17 BRPM | SYSTOLIC BLOOD PRESSURE: 123 MMHG | OXYGEN SATURATION: 95 % | BODY MASS INDEX: 23.27 KG/M2 | HEART RATE: 120 BPM | TEMPERATURE: 98.4 F | DIASTOLIC BLOOD PRESSURE: 87 MMHG

## 2024-11-05 DIAGNOSIS — R49.0 DYSPHONIA: ICD-10-CM

## 2024-11-05 PROCEDURE — 99215 OFFICE O/P EST HI 40 MIN: CPT

## 2024-11-05 PROCEDURE — G2211 COMPLEX E/M VISIT ADD ON: CPT | Mod: NC

## 2024-11-06 DIAGNOSIS — G89.3 NEOPLASM RELATED PAIN (ACUTE) (CHRONIC): ICD-10-CM

## 2024-11-06 LAB
ALBUMIN SERPL ELPH-MCNC: 4.5 G/DL
ALP BLD-CCNC: 87 U/L
ALT SERPL-CCNC: 15 U/L
ANION GAP SERPL CALC-SCNC: 15 MMOL/L
AST SERPL-CCNC: 28 U/L
BILIRUB SERPL-MCNC: 0.6 MG/DL
BUN SERPL-MCNC: 10 MG/DL
CALCIUM SERPL-MCNC: 10.2 MG/DL
CHLORIDE SERPL-SCNC: 97 MMOL/L
CO2 SERPL-SCNC: 26 MMOL/L
CREAT SERPL-MCNC: 0.89 MG/DL
EGFR: 102 ML/MIN/1.73M2
GLUCOSE SERPL-MCNC: 134 MG/DL
POTASSIUM SERPL-SCNC: 4.5 MMOL/L
PROT SERPL-MCNC: 7.4 G/DL
SODIUM SERPL-SCNC: 138 MMOL/L
URATE SERPL-MCNC: 4.2 MG/DL

## 2024-11-06 RX ORDER — OXYCODONE 5 MG/1
5 TABLET ORAL
Qty: 28 | Refills: 0 | Status: ACTIVE | COMMUNITY
Start: 2024-11-06 | End: 1900-01-01

## 2024-11-11 ENCOUNTER — NON-APPOINTMENT (OUTPATIENT)
Age: 54
End: 2024-11-11

## 2024-11-15 ENCOUNTER — APPOINTMENT (OUTPATIENT)
Dept: HEMATOLOGY ONCOLOGY | Facility: CLINIC | Age: 54
End: 2024-11-15
Payer: MEDICAID

## 2024-11-15 ENCOUNTER — RESULT REVIEW (OUTPATIENT)
Age: 54
End: 2024-11-15

## 2024-11-15 ENCOUNTER — APPOINTMENT (OUTPATIENT)
Dept: HEMATOLOGY ONCOLOGY | Facility: CLINIC | Age: 54
End: 2024-11-15

## 2024-11-15 ENCOUNTER — APPOINTMENT (OUTPATIENT)
Dept: INFUSION THERAPY | Facility: HOSPITAL | Age: 54
End: 2024-11-15

## 2024-11-15 VITALS
TEMPERATURE: 98.9 F | RESPIRATION RATE: 17 BRPM | HEART RATE: 117 BPM | WEIGHT: 173.99 LBS | BODY MASS INDEX: 22.96 KG/M2 | DIASTOLIC BLOOD PRESSURE: 85 MMHG | SYSTOLIC BLOOD PRESSURE: 129 MMHG | OXYGEN SATURATION: 98 %

## 2024-11-15 DIAGNOSIS — Z51.11 ENCOUNTER FOR ANTINEOPLASTIC CHEMOTHERAPY: ICD-10-CM

## 2024-11-15 DIAGNOSIS — R05.9 COUGH, UNSPECIFIED: ICD-10-CM

## 2024-11-15 PROCEDURE — 99214 OFFICE O/P EST MOD 30 MIN: CPT

## 2024-11-15 RX ORDER — BENZONATATE 100 MG/1
100 CAPSULE ORAL
Qty: 40 | Refills: 1 | Status: ACTIVE | COMMUNITY
Start: 2024-11-15 | End: 1900-01-01

## 2024-11-16 ENCOUNTER — APPOINTMENT (OUTPATIENT)
Dept: INFUSION THERAPY | Facility: HOSPITAL | Age: 54
End: 2024-11-16

## 2024-11-16 RX ORDER — METOCLOPRAMIDE 10 MG/1
10 TABLET ORAL EVERY 6 HOURS
Qty: 15 | Refills: 0 | Status: ACTIVE | COMMUNITY
Start: 2024-11-16 | End: 1900-01-01

## 2024-11-18 ENCOUNTER — APPOINTMENT (OUTPATIENT)
Dept: INFUSION THERAPY | Facility: HOSPITAL | Age: 54
End: 2024-11-18

## 2024-11-18 ENCOUNTER — RESULT REVIEW (OUTPATIENT)
Age: 54
End: 2024-11-18

## 2024-11-18 ENCOUNTER — APPOINTMENT (OUTPATIENT)
Dept: HEMATOLOGY ONCOLOGY | Facility: CLINIC | Age: 54
End: 2024-11-18
Payer: MEDICAID

## 2024-11-18 DIAGNOSIS — C34.90 MALIGNANT NEOPLASM OF UNSPECIFIED PART OF UNSPECIFIED BRONCHUS OR LUNG: ICD-10-CM

## 2024-11-18 DIAGNOSIS — R04.2 HEMOPTYSIS: ICD-10-CM

## 2024-11-18 DIAGNOSIS — R74.01 ELEVATION OF LEVELS OF LIVER TRANSAMINASE LEVELS: ICD-10-CM

## 2024-11-18 DIAGNOSIS — R11.2 NAUSEA WITH VOMITING, UNSPECIFIED: ICD-10-CM

## 2024-11-18 PROCEDURE — 99213 OFFICE O/P EST LOW 20 MIN: CPT

## 2024-11-18 RX ORDER — ONDANSETRON 4 MG/1
4 TABLET ORAL
Qty: 30 | Refills: 3 | Status: ACTIVE | COMMUNITY
Start: 2024-11-18 | End: 1900-01-01

## 2024-11-20 ENCOUNTER — APPOINTMENT (OUTPATIENT)
Dept: INFUSION THERAPY | Facility: HOSPITAL | Age: 54
End: 2024-11-20

## 2024-11-20 ENCOUNTER — RESULT REVIEW (OUTPATIENT)
Age: 54
End: 2024-11-20

## 2024-11-20 ENCOUNTER — NON-APPOINTMENT (OUTPATIENT)
Age: 54
End: 2024-11-20

## 2024-11-20 ENCOUNTER — APPOINTMENT (OUTPATIENT)
Dept: HEMATOLOGY ONCOLOGY | Facility: CLINIC | Age: 54
End: 2024-11-20

## 2024-11-21 ENCOUNTER — APPOINTMENT (OUTPATIENT)
Dept: HEMATOLOGY ONCOLOGY | Facility: CLINIC | Age: 54
End: 2024-11-21

## 2024-11-29 NOTE — CONSULT NOTE ADULT - ASSESSMENT
54M w/ metastatic SCLC presents with 2 weeks SOB. Vascular consulted for SVC narrowing due to lymph node compression.     Plan:  No acute vascular intervention  Plan to be discussed with vascular attending

## 2024-11-29 NOTE — CONSULT NOTE ADULT - NS ATTEND AMEND GEN_ALL_CORE FT
examined at bed side, dyspneic at moment.  TTE and CT chest reviewed, no direct cardiac or pericardial involvement noted.   Cont supportive care, strongly consider palliative/hospice care at this time.   Signing off.

## 2024-11-29 NOTE — CONSULT NOTE ADULT - ASSESSMENT
53 y/o male with no cardiac hx,  former smoker with small cell cancer of the lung who was referred by oncology due to sob and tachycardia.  Patient has not been eating or drinking well in several weeks. 02 requirements have increased.  Cant lye flat in bed due to coughing ,  He has inspiratory  chest pain at times.  We were called due to pleural effusions on ct with concern for chf  CT also c/w SVC  narrowing from the nodes  Bnp 300

## 2024-11-29 NOTE — CONSULT NOTE ADULT - NS ATTEND OPT1 GEN_ALL_CORE
I attest my time as attending is greater than 50% of the total combined time spent on qualifying patient care activities by the PA/NP and attending.
No

## 2024-11-29 NOTE — ED ADULT NURSE NOTE - OBJECTIVE STATEMENT
Pt axo4. Presents to ed for worsening sob. Pt is currently being treated for lung cancer. Has been having trouble breathing since last chemo tx 2 weeks ago. Due for another treatment in 2 weeks. Wife at bedside states he has also been increasingly weak and poor po intake at home. Pt states he is on 4L NC at home at rest, up to 8L NC with activity. Pt is currently on 7 L NC. Pt denies any recent fevers, chills, N/V/D. Denies any other pertinent medical hx.

## 2024-11-29 NOTE — CONSULT NOTE ADULT - ATTENDING COMMENTS
d/w medical attending , no role currently for SVC stent , no symptoms attributed to SVC syndrome at this time

## 2024-11-29 NOTE — ED PROCEDURE NOTE - NS ED PROC PERFORMED BY1 FT
Clinically improved s/p inpatient hospitalization  Maintain meds as recommended by GI  
Clinically stable using O2 at bedtime only  F/U with pulm as scheduled in July  
Bina Hill, DO
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
## 2024-11-29 NOTE — CONSULT NOTE ADULT - PROBLEM SELECTOR RECOMMENDATION 2
Mechanical due to nodes  no immediate cardiac intervention needed  further chemo when patient improves

## 2024-11-29 NOTE — CONSULT NOTE ADULT - SUBJECTIVE AND OBJECTIVE BOX
Vascular HPI:  54M with metastatic SCLC presents with 2 weeks SOB after first round chemo (Zepzelca). Patient found to have acute on Chronic Respiratory Failure, bilateral pleural effusions R>L, left upper lobe pneumonia in setting of progression of metastatic SCLC.     Vascular consulted for CTA findings of SVC narrowing due to lymph node compression.     Admission HPI:  54 year old male with PMHx lung cancer treated with chemo Zepzelca in [place] (last treatment 2 weeks ago) advised to come in by oncology RN for trouble breathing and tachycardia. Patient speaking in short sentences, requesting wife at bedside provide history. Per wife Zayra, patient has been short of breath since last chemo treatment, has to sit up in chair/bed, cannot lay down without worsening symptoms. Patient utilizes 4L O2 at baseline and up to 8L when moving. Wife states patient appeared worse today, called oncology office and was advised to come into ED. Wife also notes patient with poor appetite and energy since chemotherapy. States patient's HR has been in the 120s over last 2 weeks, including during last chemo session which staff was not concerned about. Denies fevers, nausea, vomiting, diarrhea, chest pain.      MEDICATIONS  (STANDING):  cefepime  Injectable.      cefepime  Injectable. 2000 milliGRAM(s) IV Push once  cefepime  Injectable. 2000 milliGRAM(s) IV Push every 8 hours  vancomycin  IVPB 1500 milliGRAM(s) IV Intermittent every 12 hours    MEDICATIONS  (PRN):  acetaminophen     Tablet .. 650 milliGRAM(s) Oral every 6 hours PRN Temp greater or equal to 38C (100.4F), Mild Pain (1 - 3)  aluminum hydroxide/magnesium hydroxide/simethicone Suspension 30 milliLiter(s) Oral every 4 hours PRN Dyspepsia  melatonin 3 milliGRAM(s) Oral at bedtime PRN Insomnia  ondansetron Injectable 4 milliGRAM(s) IV Push every 8 hours PRN Nausea and/or Vomiting      Vital Signs Last 24 Hrs  T(C): 36.7 (29 Nov 2024 16:59), Max: 37.3 (29 Nov 2024 16:05)  T(F): 98 (29 Nov 2024 16:59), Max: 99.1 (29 Nov 2024 16:05)  HR: 127 (29 Nov 2024 16:59) (104 - 143)  BP: 135/81 (29 Nov 2024 16:59) (104/81 - 135/81)  BP(mean): --  RR: 25 (29 Nov 2024 16:59) (23 - 25)  SpO2: 100% (29 Nov 2024 16:59) (98% - 100%)    Parameters below as of 29 Nov 2024 16:59  Patient On (Oxygen Delivery Method): nasal cannula        Physical Exam:    Constitutional: Cachectic, breathy speech  HEENT:  EOMI  Respiratory: Labored respirations, accessory muscle use  Gastrointestinal: Soft, non-tender, non-distended  Neurological: A&O x 3  Vascular: b/l palpable pedal pulses, b/l palpable radial pulses, no swelling or erythema to upper extremities      LABS:                        10.2   3.36  )-----------( 415      ( 29 Nov 2024 12:52 )             30.9     11-29    135  |  92[L]  |  25.6[H]  ----------------------------<  137[H]  4.2   |  28.0  |  0.59    Ca    10.2      29 Nov 2024 12:52    TPro  7.4  /  Alb  3.8  /  TBili  0.7  /  DBili  x   /  AST  32  /  ALT  28  /  AlkPhos  76  11-29    PT/INR - ( 29 Nov 2024 12:52 )   PT: 14.5 sec;   INR: 1.25 ratio         PTT - ( 29 Nov 2024 12:52 )  PTT:31.1 sec  Urinalysis Basic - ( 29 Nov 2024 12:52 )    Color: x / Appearance: x / SG: x / pH: x  Gluc: 137 mg/dL / Ketone: x  / Bili: x / Urobili: x   Blood: x / Protein: x / Nitrite: x   Leuk Esterase: x / RBC: x / WBC x   Sq Epi: x / Non Sq Epi: x / Bacteria: x        A:

## 2024-11-29 NOTE — ED PROVIDER NOTE - CLINICAL SUMMARY MEDICAL DECISION MAKING FREE TEXT BOX
54 year old male with hx lung CA currently on chemotherapy treatment presenting with difficulty breathing and tachycardia x 2 weeks, worse today, advised to come in by oncology RN ([place]). Patient 100% on 6L, tachycardic 130s-140s, unable to sit/lay back due to severe dyspnea.     Attempted BiPAP at 1320, patient unable to tolerate pressure. Offered anxiolysis however patient declining. 54 year old male with hx lung CA currently on chemotherapy treatment presenting with difficulty breathing and tachycardia x 2 weeks, worse today, advised to come in by oncology RN ([place]). Patient 100% on 6L, tachycardic 130s-140s, unable to sit/lay back due to severe dyspnea.     Attempted BiPAP at 1320, patient unable to tolerate pressure. Offered anxiolysis however patient declining. Patient also declining CPAP trial.   Chest x-ray demonstrating pleural effusion on left. Thoracic surgery consulted 1343 for pleurocentesis evaluation. 54 year old male with hx lung CA currently on chemotherapy treatment presenting with difficulty breathing and tachycardia x 2 weeks, worse today, advised to come in by oncology RN ([place]). Patient 100% on 6L, tachycardic 130s-140s, unable to sit/lay back due to severe dyspnea. EKG without ischemia. Plan for monitor, labs, chest x-ray, CT. Diffuse rales on exam, will trial Lasix 20mg IVP.     Attempted BiPAP at 1320, patient unable to tolerate pressure. Offered anxiolysis however patient declining. Patient also declining CPAP trial.   Chest x-ray demonstrating pleural effusion on left. Thoracic surgery consulted 1343 for pleurocentesis evaluation. 54 year old male with hx lung CA currently on chemotherapy treatment presenting with difficulty breathing and tachycardia x 2 weeks, worse today, advised to come in by oncology RN ([place]). Patient 100% on 6L, tachycardic 130s-140s, unable to sit/lay back due to severe dyspnea. EKG without ischemia. Plan for monitor, labs, chest x-ray, CT. Diffuse rales on exam, will trial Lasix 20mg IVP.     Attempted BiPAP at 1320, patient unable to tolerate pressure. Offered anxiolysis however patient declining. Patient also declining CPAP trial.   Chest x-ray demonstrating pleural effusion on left. Thoracic surgery consulted 1343 for pleurocentesis evaluation.    1405 Patient evaluated by thoracic sx, no drainable fluid. Patient states will try to lie down for CT scan with anxiolytic. 54 year old male with hx lung CA currently on chemotherapy treatment presenting with difficulty breathing and tachycardia x 2 weeks, worse today, advised to come in by oncology RN ([place]). Patient 100% on 6L, tachycardic 130s-140s, unable to sit/lay back due to severe dyspnea. EKG without ischemia. Plan for monitor, labs, chest x-ray, CT. Diffuse rales on exam, will trial Lasix 20mg IVP.     Attempted BiPAP at 1320, patient unable to tolerate pressure. Offered anxiolysis however patient declining. Patient also declining CPAP trial.   Chest x-ray demonstrating pleural effusion on left. Thoracic surgery consulted 1343 for pleurocentesis evaluation.    1405 Patient evaluated by thoracic sx, no drainable fluid. Patient states will try to lie down for CT scan with anxiolytic.        CT results reviewed. Discussed with on call doctor for patient's main oncologist (Dr. Brianda Calivllo for Dr. Divya Rasheed)    Per Dr. Avilez, patient's last imaging was PET scan on 10/24 demonstrating multiple enlarged lymph nodes in mediastinum and bilateral sreekanth, multiple nodules in bilateral lungs, nodules in pericardium, nodule adjacent to left main coronary artery, bone mets, nodule by left vocal cord, large left hilar conglomerate and subcarinal conglomerate.     Based on this PET scan report patient appeared to have progression of his cancer. 54 year old male with hx lung CA currently on chemotherapy treatment presenting with difficulty breathing and tachycardia x 2 weeks, worse today, advised to come in by oncology RN ([place]). Patient 100% on 6L, tachycardic 130s-140s, unable to sit/lay back due to severe dyspnea. EKG without ischemia. Plan for monitor, labs, chest x-ray, CT. Diffuse rales on exam, will trial Lasix 20mg IVP.     Attempted BiPAP at 1320, patient unable to tolerate pressure. Offered anxiolysis however patient declining. Patient also declining CPAP trial.   Chest x-ray demonstrating pleural effusion on left. Thoracic surgery consulted 1343 for pleurocentesis evaluation.    1405 Patient evaluated by thoracic sx, no drainable fluid. Patient states will try to lie down for CT scan with anxiolytic.        CT results reviewed. Discussed with on call doctor for patient's main oncologist (Dr. Brianda Calvillo for Dr. Divya Rasheed)    Per Dr. Avilez, patient's last imaging was PET scan on 10/24 demonstrating multiple enlarged lymph nodes in mediastinum and bilateral sreekanth, multiple nodules in bilateral lungs, nodules in pericardium, nodule adjacent to left main coronary artery, bone mets, nodule by left vocal cord, large left hilar conglomerate and subcarinal conglomerate.     Based on this PET scan report patient appeared to have progression of his cancer with decreased response to prior chemotherapy, patient was therefore started on new chemotherapy Zepzelca on 11/15. On comparison of PET and today's CTA, b/l plural effusions, left upper lobe consolidation,  and enlarged lymph node surrounding SVC are new. Per Dr. Avilez, as patient just started new chemotherapy, likely needs more treatments to assess response.     This was discussed with hospitalist, admit to tele.

## 2024-11-29 NOTE — PROGRESS NOTE ADULT - ASSESSMENT
54 year old male with PMHx lung cancer treated with chemo in [place] (last treatment 2 weeks ago) advised to come in by oncology RN for trouble breathing and tachycardia. Patient speaking in short sentences, requesting wife at bedside provide history. Per wife Zayra, patient has been short of breath since last chemo treatment, has to sit up in chair/bed, cannot lay down without worsening symptoms. Patient utilizes 4L O2 at baseline and up to 8L when moving. Wife states patient appeared worse today, called oncology office and was advised to come into ED. Wife also notes patient with poor appetite and energy since chemotherapy. States patient's HR has been in the 120s over last 2 weeks, including during last chemo session which staff was not concerned about. Denies fevers, nausea, vomiting, diarrhea, chest pain. CXR with possible Left pleural effusion. CT surgery called to evaluate

## 2024-11-29 NOTE — H&P ADULT - ASSESSMENT
Acute on Chronic Respiratory Failure mulifactorial in setting of progression of metastatic SCLC, bilateral pleural effusions R>L, left upper lobe pneumonia    Pneumonia  -start vanc, cefepime, azithromycin  -check sputum culture  -urine strep  -urine legionella  -MRSA PCR  -viral RVP negative  -blood culture  -pulmonary and ID consult    SCLC  -oncology consult  -likely progressive    bilateral pleural effusions  -CT surgery consulted   -s/p 20mg IV in ER  -CT surgery to reevaluate  -AM chest-ray to further evaluate for lasix    Sinus tachycardia, multifactorial in setting of pneumonia, SCLC, SVC compression, pain, toxicity from recent chemotherapy, component of anemia  -Echo  -Cardiac monitoring  -Cardiology consult  -admitted to step down    SVC compression 2/2 lymphadenopathy  -Vascular consulted    leukopenia but not neutropenic 2/2 infection vs chemotoxicity  -continue to trend  -treatment as above    Acute blood loss anemia  -Normocytic  -possibly due to hemoptysis as noted at bedside and/or 2/2 chemotherapy  -continue to trend  -transfuse for hemoglobin <7  -Pulmonary consult.      Constipation  -miralax   -Senna    DVT prophylaxis  -SCD  -no anticoagulants due to acute blood loss anemia.      54M with PMH of Chronic Hypoxic Respiratory Failure and Metastatic SCLC recently initiated cycle of Zepzelca at [facility] 2 weeks PTA presenting to ER with tachycardia, SOB and hemoptysis since initiation of chemo. In ER CTA demonstrated Significant diffuse LAD, severe SVC Compression, SERG consolidation/Mass, Bilateral pleural effusions. Noted to be maintaining oxygen on home rate NC 4LPM. Also noted to be sinus tachycardic to 120's-130's with non ischemic ekc and negative serial troponins.    Acute on Chronic Respiratory Failure mulifactorial in setting of progression of metastatic SCLC, bilateral pleural effusions R>L, left upper lobe pneumonia    Pneumonia  -CT showing SERG consolidation  -start vanc, cefepime, azithromycin  -check sputum culture  -urine strep  -urine legionella  -MRSA PCR  -viral RVP negative  -blood culture  -pulmonary and ID consulted    SCLC  -oncology consult  -likely progressive    bilateral pleural effusions  -CT surgery consulted   -s/p 20mg IV in ER  -CT surgery to reevaluate  -AM chest-ray to further evaluate for lasix    Sinus tachycardia, multifactorial in setting of pneumonia, SCLC, SVC compression, pain, toxicity from recent chemotherapy, component of anemia  -Echo  -Cardiac monitoring  -Cardiology consult  -admitted to step down    SVC compression 2/2 lymphadenopathy  -Vascular consulted    leukopenia but not neutropenic 2/2 infection vs chemotoxicity  -continue to trend  -treatment as above    Acute blood loss anemia  -Normocytic  -possibly due to hemoptysis as noted at bedside and/or 2/2 chemotherapy  -continue to trend  -transfuse for hemoglobin <7  -Pulmonary consult.      Constipation  -miralax   -Senna    DVT prophylaxis  -SCD  -no anticoagulants due to acute blood loss anemia.     Dispo: Step down

## 2024-11-29 NOTE — CONSULT NOTE ADULT - PROBLEM SELECTOR RECOMMENDATION 9
trops neg x 2  check tsh  would consider 500 cc fluid challenge  patient looks dry and has not been eating and drinking for past 2 weeks  Check Echo for Ejection fraction

## 2024-11-29 NOTE — ED ADULT NURSE REASSESSMENT NOTE - NS ED NURSE REASSESS COMMENT FT1
pt is stable sating well om 6L O2 family at bedside, pt on way to ECHO then will go upstairs safety maintained,   repORT given to floor Rn

## 2024-11-29 NOTE — H&P ADULT - NSHPPHYSICALEXAM_GEN_ALL_CORE
GENERAL: Cachetic, hunched over difficulty breathing.   HEAD: NC/AT  EYES: PERRLA, EOMI, Non-icteric  ENT: Mucous membranes moist, neck supple  NEURO: No focal deficits, moving all extremities spontaneously, A&Ox3, no dysarthria, CN II-XII grossly intact  PSYCH: Normal affect, calm, appropriate insight and judgment, fluent speech  RESP: bibasilar crackles bilaterally lung sounds diminished on right, labored breathing.   CVS: RRR, no murmur appreciated  GI: Soft, non-tender, non-distended, no organomegaly, no appreciable masses, +bs all 4 quadrants  : No ellis, no suprapubic tenderness  EXTREMITIES: Nontender, no clubbing, cyanosis, or edema  SKIN: No rashes or lesions

## 2024-11-29 NOTE — H&P ADULT - HISTORY OF PRESENT ILLNESS
54 year old male with a past medical history of Small Cell Lung cancer ( 54 year old male with a past medical history of Small Cell Lung cancer via biopsy of L neck mass on 03/24, initally was on zarxio, Per wife patient is now on zepzelca. Since chemotherapy 2 weeks ago patient has been tachycardic to the 120s and has had episodes of hemoptysis. Patient now with dyspnea requiring 8L on admission. Patient was unable to lay in bed and had to sit up. Per wife at bedise patient appeared worse today and was advised by oncology office to come into ED. In ER CTA showing diffuse LAD, severe SVC compression, SERG consolidation/mass, bilateral pleural effusions. Patient in Sinus tachycardia. At bedside patient having difficulty speaking due to lack of oxygen. Patient can only mouth words and is bent over during exam. Patient wife states he last had a bowel movement several days ago and there was some blood during wiping but not with bowel movement. Wife states that patient has been coughing up blood for 2 weeks. Patient showed sputum to providers and it is bright red with clots. Patient complains of bilateral chest pain worse with coughing. Patient denies fevers, nausea, vomiting, dairrhea.  54 year old male with a past medical history of Small Cell Lung cancer via biopsy of L neck mass on 03/24, initally was on zarxio, Per wife patient is now on zepzelca. Since chemotherapy 2 weeks ago patient has been tachycardic to the 120s and has had episodes of hemoptysis. Patient now with dyspnea requiring 8L on admission. Patient was unable to lay in bed and had to sit up. Per wife at bedise patient appeared worse today and was advised by oncology office to come into ED. In ER CTA showing diffuse LAD, severe SVC compression, SERG consolidation/mass, bilateral pleural effusions. Patient in Sinus tachycardia. At bedside patient having difficulty speaking due to lack of oxygen. Patient on 4L which per wife is how much he uses at home.  Patient can only mouth words and is bent over during exam. Patient wife states he last had a bowel movement several days ago and there was some blood during wiping but not with bowel movement. Wife states that patient has been coughing up blood for 2 weeks. Patient showed sputum to providers and it is bright red with clots. Patient complains of bilateral chest pain worse with coughing. Patient denies fevers, nausea, vomiting, dairrhea.

## 2024-11-29 NOTE — CONSULT NOTE ADULT - PROBLEM SELECTOR RECOMMENDATION 3
No clinical evidence of chf on exam or by bnp of 310  would hold further Lasix  Would get echo to assess EF

## 2024-11-29 NOTE — PROVIDER CONTACT NOTE (OTHER) - ASSESSMENT
pt couldn't tolerate pressure from mask.  Tried a low BIPAP setting and also tried on CPAP 8cmh2o. Still wouldn't wear.

## 2024-11-29 NOTE — ED ADULT NURSE REASSESSMENT NOTE - NS ED NURSE REASSESS COMMENT FT1
Pt axo4. Respirations even and unlabored. Transferred with appropriate level of care to A10L. Report given to Sherrell RUEDA.

## 2024-11-29 NOTE — ED PROVIDER NOTE - OBJECTIVE STATEMENT
54 year old male with PMHx lung cancer treated with chemo in [place] (last treatment 2 weeks ago) sent by PCP for trouble breathing and tachycardia. Patient speaking in short sentences, requesting wife at bedside provide history. Per wife Zayra, patient has been short of breath since last chemo treatment, has to sit up in chair/bed, cannot lay down without worsening symptoms. Patient felt worse today 54 year old male with PMHx lung cancer treated with chemo in [place] (last treatment 2 weeks ago) advised to come in by oncology RN for trouble breathing and tachycardia. Patient speaking in short sentences, requesting wife at bedside provide history. Per wife Zayra, patient has been short of breath since last chemo treatment, has to sit up in chair/bed, cannot lay down without worsening symptoms. Patient utilizes 4L O2 at baseline and up to 8L when moving. Wife states patient appeared worse today, called oncology office and was advised to come into ED. Wife also notes patient with poor appetite and energy since chemotherapy. States patient's HR has been in the 120s over last 2 weeks, including during last chemo session which staff was not concerned about. Denies fevers, nausea, vomiting, diarrhea, chest pain. 54 year old male with PMHx lung cancer treated with chemo Zepzelca in [place] (last treatment 2 weeks ago) advised to come in by oncology RN for trouble breathing and tachycardia. Patient speaking in short sentences, requesting wife at bedside provide history. Per wife Zayra, patient has been short of breath since last chemo treatment, has to sit up in chair/bed, cannot lay down without worsening symptoms. Patient utilizes 4L O2 at baseline and up to 8L when moving. Wife states patient appeared worse today, called oncology office and was advised to come into ED. Wife also notes patient with poor appetite and energy since chemotherapy. States patient's HR has been in the 120s over last 2 weeks, including during last chemo session which staff was not concerned about. Denies fevers, nausea, vomiting, diarrhea, chest pain.    Oncologist: Dr. Divya Rasheed 54 year old male with PMHx lung cancer treated with chemo Zepzelca in [place] (last treatment 2 weeks ago) advised to come in by oncology RN for trouble breathing and tachycardia. Patient speaking in short sentences, requesting wife at bedside provide history. Per wife Zayra, patient has been short of breath since last chemo treatment, has to sit up in chair/bed, cannot lay down without worsening symptoms. Patient utilizes 4L O2 at baseline and up to 8L when moving. Wife states patient appeared worse today, called oncology office and was advised to come into ED. Wife also notes patient with poor appetite and energy since chemotherapy. States patient's HR has been in the 120s over last 2 weeks, including during last chemo session which staff was not concerned about. Denies fevers, nausea, vomiting, diarrhea, chest pain.    Oncologist: Dr. Divya Rasheed ([facility])

## 2024-11-29 NOTE — ED PROVIDER NOTE - ATTENDING CONTRIBUTION TO CARE
54 year old male with hx lung CA currently on chemotherapy treatment presenting with difficulty breathing and tachycardia x 2 weeks, worse today, advised to come in by oncology RN ([place]). Patient 100% on 6L, tachycardic 130s-140s, unable to sit/lay back due to severe dyspnea. EKG without ischemia. Plan for monitor, labs, chest x-ray, CT. Diffuse rales on exam, will trial Lasix 20mg IVP.     Attempted BiPAP at 1320, patient unable to tolerate pressure. Offered anxiolysis however patient declining. Patient also declining CPAP trial.   Chest x-ray demonstrating pleural effusion on left. Thoracic surgery consulted 1343 for pleurocentesis evaluation.

## 2024-11-29 NOTE — ED ADULT TRIAGE NOTE - CHIEF COMPLAINT QUOTE
BIBA c/o SOB x3 weeks. Hx of lung CA. "The breathing worsened more than usual today". Pt uses 4L o2 at home. States he finished last round of chemo a few weeks ago. Pt placed in critical care to be evaluated.

## 2024-11-29 NOTE — PROGRESS NOTE ADULT - SUBJECTIVE AND OBJECTIVE BOX
54 year old male with PMHx lung cancer treated with chemo in [place] (last treatment 2 weeks ago) advised to come in by oncology RN for trouble breathing and tachycardia. Patient speaking in short sentences, requesting wife at bedside provide history. Per wife Zayra, patient has been short of breath since last chemo treatment, has to sit up in chair/bed, cannot lay down without worsening symptoms. Patient utilizes 4L O2 at baseline and up to 8L when moving. Wife states patient appeared worse today, called oncology office and was advised to come into ED. Wife also notes patient with poor appetite and energy since chemotherapy. States patient's HR has been in the 120s over last 2 weeks, including during last chemo session which staff was not concerned about. Denies fevers, nausea, vomiting, diarrhea, chest pain. CXR with possible Left pleural effusion. CT surgery called to evaluate    Review of Systems: negative x 10 systems except as noted above            PAST MEDICAL & SURGICAL HISTORY:        MEDICATIONS  (PRN):      Weight (kg): 79.832 (11-29 @ 12:52)  Daily     Daily                               10.2   3.36  )-----------( 415      ( 29 Nov 2024 12:52 )             30.9   11-29    135  |  92[L]  |  25.6[H]  ----------------------------<  137[H]  4.2   |  28.0  |  0.59    Ca    10.2      29 Nov 2024 12:52    TPro  7.4  /  Alb  3.8  /  TBili  0.7  /  DBili  x   /  AST  32  /  ALT  28  /  AlkPhos  76  11-29      PT/INR - ( 29 Nov 2024 12:52 )   PT: 14.5 sec;   INR: 1.25 ratio         PTT - ( 29 Nov 2024 12:52 )  PTT:31.1 sec      Objective:  T(C): 36.8 (11-29-24 @ 12:33), Max: 36.8 (11-29-24 @ 12:33)  HR: 125 (11-29-24 @ 14:19) (104 - 143)  BP: 116/78 (11-29-24 @ 14:19) (116/78 - 130/84)  RR: 25 (11-29-24 @ 13:56) (24 - 25)  SpO2: 100% (11-29-24 @ 13:56) (98% - 100%)  Wt(kg): --CAPILLARY BLOOD GLUCOSE      I&O's Summary      Physical Exam  General: NAD, cachectic  Neuro: A+O x 3, non-focal, speech clear and intact  Psych: Appropriate affect  HEENT:  NCAT, No conjuctival edema or icterus, no thrush.  Pulm: diminished bases b/l  CV: tachycardic +S1S2  Abd: soft, NT, ND, +BS  Ext: +DP Pulses b/l, no edema  Skin: Warm, dry, intact          Imaging:      CXR pending read. CT chest pending read.    POCUS b/l performed no window seen for fluid drainage. Small/mod RT effusion Seen on CT chest. SERG consolidation/PNA on CT scan           **CONSULT NOTE**    54 year old male with PMHx lung cancer treated with chemo in [place] (last treatment 2 weeks ago) advised to come in by oncology RN for trouble breathing and tachycardia. Patient speaking in short sentences, requesting wife at bedside provide history. Per wife Zayra, patient has been short of breath since last chemo treatment, has to sit up in chair/bed, cannot lay down without worsening symptoms. Patient utilizes 4L O2 at baseline and up to 8L when moving. Wife states patient appeared worse today, called oncology office and was advised to come into ED. Wife also notes patient with poor appetite and energy since chemotherapy. States patient's HR has been in the 120s over last 2 weeks, including during last chemo session which staff was not concerned about. Denies fevers, nausea, vomiting, diarrhea, chest pain. CXR with possible Left pleural effusion. CT surgery called to evaluate    Review of Systems: negative x 10 systems except as noted above            PAST MEDICAL & SURGICAL HISTORY:        MEDICATIONS  (PRN):      Weight (kg): 79.832 (11-29 @ 12:52)  Daily     Daily                               10.2   3.36  )-----------( 415      ( 29 Nov 2024 12:52 )             30.9   11-29    135  |  92[L]  |  25.6[H]  ----------------------------<  137[H]  4.2   |  28.0  |  0.59    Ca    10.2      29 Nov 2024 12:52    TPro  7.4  /  Alb  3.8  /  TBili  0.7  /  DBili  x   /  AST  32  /  ALT  28  /  AlkPhos  76  11-29      PT/INR - ( 29 Nov 2024 12:52 )   PT: 14.5 sec;   INR: 1.25 ratio         PTT - ( 29 Nov 2024 12:52 )  PTT:31.1 sec      Objective:  T(C): 36.8 (11-29-24 @ 12:33), Max: 36.8 (11-29-24 @ 12:33)  HR: 125 (11-29-24 @ 14:19) (104 - 143)  BP: 116/78 (11-29-24 @ 14:19) (116/78 - 130/84)  RR: 25 (11-29-24 @ 13:56) (24 - 25)  SpO2: 100% (11-29-24 @ 13:56) (98% - 100%)  Wt(kg): --CAPILLARY BLOOD GLUCOSE      I&O's Summary      Physical Exam  General: NAD, cachectic  Neuro: A+O x 3, non-focal, speech clear and intact  Psych: Appropriate affect  HEENT:  NCAT, No conjuctival edema or icterus, no thrush.  Pulm: diminished bases b/l  CV: tachycardic +S1S2  Abd: soft, NT, ND, +BS  Ext: +DP Pulses b/l, no edema  Skin: Warm, dry, intact          Imaging:      CXR pending read. CT chest pending read.    POCUS b/l performed no window seen for fluid drainage. Small/mod RT effusion Seen on CT chest. SERG consolidation/PNA on CT scan

## 2024-11-29 NOTE — ED PROVIDER NOTE - PHYSICAL EXAMINATION
Gen: chronically ill appearing male in moderate respiratory distress, sitting up on side of stretcher, leaning forward  Head: normocephalic, atraumatic  EENT: EOMI, moist mucous membranes, no scleral icterus  Lung: (+)increased work of breathing, (+) whispering in short 2-3 word sentences, (+)diffuse rales throughout  CV: tachycardic rate, regular rhythm, normal s1/s2  Abd: soft, non-tender, non-distended  MSK: No edema, no visible deformities, full range of motion in all 4 extremities  Neuro: Awake, alert, no focal neurologic deficits
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
## 2024-11-30 NOTE — CONSULT NOTE ADULT - ASSESSMENT
54M PMH SCLC (02/2024) via biopsy of L neck mass on chemo, former heavy smoker (2ppd x 40 years) , anemia who presented 11/29/24 with respiratory failure, SOB, found with severe SVC compression, SERG consolidation, B/L pleural effusions, s/p R PTC 11/30/24    Impression:  - Severe SVC compression, impending SVC syndrome  - Likely post-obstructive PNA  - Heavy former smoker, ?COPD  - Metastatic SCLC on chemo  - B/L pleural effusions - s/p R PTC 11/30/24  - SOB, increased work of breathing    Recommendations:  - Heme/onc following  - S/p R PTC placement today; f/u cultures, cell count, cytopathology  - Pt does feel some symptom improvement s/p R PTC  - DuoNebs  - Agree with ABx - broad spectrum  - F/u BCx  - Pt was trialed on HFNC but did not tolerate  - If WOB worsens would consider BPAP, may need mild sedative to maintain compliance  - ?I question if palliative radiation is an option for SVC compression  - Prognosis appears to be poor      Bowen Benitez MD, [facility]  , Pulmonary & Critical Care Medicine  [facility]  Pulmonary and Sleep Medicine at [place]  [address]  [place] [address]  T: [phone]  F: [phone]

## 2024-11-30 NOTE — CONSULT NOTE ADULT - ASSESSMENT
54 M PMH metastatic SCLC currently on Lurbinectedin (last treatment 2 weeks prior), who presented to the ED with complaints of SOB and elevated heart rate.     Immunocompromised individual with SCLC, last chemotherapy 2 weeks prior    Progressive SOB  Acute hypoxic respiratory failure on supplemental O2   Afebrile  Leukopenia  RVP negative  Abnormal imaging   CT Chest showed no PE, diffuse LAD, perihilar opacities (largest in SERG 7.2 x 4.5cm), severe SVC narrowing 2/2 LAD, narrowing of trachea and bronchi 2/2 LAD and perihilar opacities, b/l pleural effusions  Concern for superimposed pneumonia vs progression of metastatic SCLC leading to pt's presentation vs both     Plan:   Continue Vancomycin IV  Please check Vancomycin trough before 4th sequential dose   Check MRSA PCR; if negative can stop Vancomycin   Continue Cefepime IV  Continue Azithromycin IV  Check Legionella Ur Ag; if negative can stop Azithromycin   Check sputum culture if patient able to expectorate   Will order serum procalcitonin for AM  Follow up blood culture  Oncology evaluation    Guarded prognosis.
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
## 2024-11-30 NOTE — PROGRESS NOTE ADULT - ASSESSMENT
54M with PMH of Chronic Hypoxic Respiratory Failure on 4 l nc and Metastatic SCLC recently initiated cycle of Zepzelca at [facility]/[place] 2 weeks ago presenting to ER with tachycardia, SOB and hemoptysis since initiation of chemo. In ER CTA demonstrated Significant diffuse LAD, severe SVC Compression, SERG consolidation/Mass, Bilateral pleural effusions. Requiring oxygen via NC 6LPM. Also noted to be sinus tachycardic to 120's-130's with non ischemic ekg and negative serial troponins.    Acute on Chronic Respiratory Failure mulifactorial in setting of progression of metastatic SCLC, bilateral pleural effusions R>L,   -oxygen supplement on 6 l   -abg reviewed  -ct surgery for thoracentesis  -start vanc, cefepime, azithromycin to cover obstructive pna  - sputum culture  -urine strep  -urine legionella  -MRSA PCR  -viral RVP negative  -blood culture  -pulmonary and ID consulted  -Notified ct surgery    SCLC  -oncology consult  - progressive disease per pt/wife at bedside. Pt was initially started on chemo march/april 2024 but no improvement which was stoppled but started chemo again 2wks ago for progression of disease.        Sinus tachycardia, multifactorial in setting of SCLC, SVC compression, pain, toxicity from recent chemotherapy, component of anemia  -Echo ef >75%,RV enlargement with reduce systolic function.  -f/u cardio rec        Superior vena cava stenosis.   - Mechanical due to nodes  -no immediate cardiac intervention needed  -seen by Vascular team    leukopenia but not neutropenic 2/2 infection vs chemotoxicity  -continue to trend  -treatment as above    Acute blood loss anemia  -Normocytic  -possibly due to hemoptysis as noted at bedside and/or 2/2 chemotherapy  -continue to trend  -transfuse for hemoglobin <7  -Pulmonary consult.      Constipation  -miralax   -Senna    DVT prophylaxis  -SCD  -no anticoagulants due to acute blood loss anemia.     Dispo: acute,pending improvement of respiratory status.  Long dw pt and wife at bedside. Understood Pt is high risk of intubation. over all prognosis guarded

## 2024-11-30 NOTE — PROGRESS NOTE ADULT - SUBJECTIVE AND OBJECTIVE BOX
Patient is a 54y old  Male who presents with a chief complaint of Shortness of breath (29 Nov 2024 18:14)      c/o sob.denies chest pain,fever,chill. Increasing oxygen demand  REVIEW OF SYSTEMS: All systems are reviewed and found to be negative except above    MEDICATIONS  (STANDING):  albuterol/ipratropium for Nebulization 3 milliLiter(s) Nebulizer every 6 hours  ascorbic acid 500 milliGRAM(s) Oral daily  azithromycin  IVPB 500 milliGRAM(s) IV Intermittent every 24 hours  cefepime  Injectable.      cefepime  Injectable. 2000 milliGRAM(s) IV Push every 8 hours  multivitamin 1 Tablet(s) Oral daily  vancomycin  IVPB 1500 milliGRAM(s) IV Intermittent every 12 hours    MEDICATIONS  (PRN):  acetaminophen     Tablet .. 650 milliGRAM(s) Oral every 6 hours PRN Temp greater or equal to 38C (100.4F), Mild Pain (1 - 3)  aluminum hydroxide/magnesium hydroxide/simethicone Suspension 30 milliLiter(s) Oral every 4 hours PRN Dyspepsia  melatonin 3 milliGRAM(s) Oral at bedtime PRN Insomnia  ondansetron Injectable 4 milliGRAM(s) IV Push every 8 hours PRN Nausea and/or Vomiting      CAPILLARY BLOOD GLUCOSE        I&O's Summary    29 Nov 2024 07:01  -  30 Nov 2024 07:00  --------------------------------------------------------  IN: 100 mL / OUT: 570 mL / NET: -470 mL        PHYSICAL EXAM:  Vital Signs Last 24 Hrs  T(C): 36.4 (30 Nov 2024 07:49), Max: 37.3 (29 Nov 2024 16:05)  T(F): 97.6 (30 Nov 2024 07:49), Max: 99.1 (29 Nov 2024 16:05)  HR: 130 (30 Nov 2024 11:14) (104 - 143)  BP: 108/67 (30 Nov 2024 11:14) (102/68 - 144/92)  BP(mean): 79 (30 Nov 2024 11:14) (78 - 101)  RR: 20 (30 Nov 2024 11:14) (20 - 26)  SpO2: 100% (30 Nov 2024 11:14) (96% - 100%)    Parameters below as of 30 Nov 2024 11:14  Patient On (Oxygen Delivery Method): nasal cannula  O2 Flow (L/min): 6      CONSTITUTIONAL: NAD,  EYES: PERRLA; conjunctiva and sclera clear  ENMT: Moist oral mucosa,   RESPIRATORY: positive air entry. crackles to auscultation bilaterally  CARDIOVASCULAR: Regular rate and rhythm, normal S1 and S2, no murmur   EXTS: No lower extremity edema; Peripheral pulses are 2+ bilaterally  ABDOMEN: Nontender to palpation, normoactive bowel sounds, no rebound/guarding;   MUSCLOSKELETAL; no joint swelling or tenderness to palpation  PSYCH: affect appropriate  NEUROLOGY: A+O to person, place, and time; CN 2-12 are intact and symmetric; no gross sensory deficits;       LABS:                        9.9    3.58  )-----------( 329      ( 30 Nov 2024 06:44 )             30.5     11-30    134[L]  |  93[L]  |  28.1[H]  ----------------------------<  181[H]  4.3   |  25.0  |  0.51    Ca    9.8      30 Nov 2024 06:44  Phos  3.3     11-30  Mg     1.9     11-30    TPro  7.4  /  Alb  3.8  /  TBili  0.7  /  DBili  x   /  AST  32  /  ALT  28  /  AlkPhos  76  11-29    PT/INR - ( 29 Nov 2024 12:52 )   PT: 14.5 sec;   INR: 1.25 ratio         PTT - ( 29 Nov 2024 12:52 )  PTT:31.1 sec      Urinalysis Basic - ( 30 Nov 2024 06:44 )    Color: x / Appearance: x / SG: x / pH: x  Gluc: 181 mg/dL / Ketone: x  / Bili: x / Urobili: x   Blood: x / Protein: x / Nitrite: x   Leuk Esterase: x / RBC: x / WBC x   Sq Epi: x / Non Sq Epi: x / Bacteria: x          RADIOLOGY & ADDITIONAL TESTS:  Results Reviewed:   < from: CT Angio Chest PE Protocol w/ IV Cont (11.29.24 @ 15:23) >  IMPRESSION:    1.  No pulmonary embolus, however, the segmental and subsegmental   divisions are not well evaluated.  2.  The enlarged chest lymph nodes are confluent with perihilar   opacities, largest of which is in the left upper lobe that measures 7.2 x   4.5 cm. These findings are concerning for the reported primary lung   malignancy.  3.  The enlarged chest lymph nodes severely narrow the SVC.  4.  The enlarged lymph nodes and perihilar opacities narrowing of the   trachea and lobar bronchi.  5.  Bilateral pleural effusions (right greater than left).    < end of copied text >  < from: TTE W or WO Ultrasound Enhancing Agent (11.29.24 @ 20:29) >  CONCLUSIONS:      1. Left ventricular cavity is normal in size. Left ventricular systolic function is hyperdynamic with an ejection fraction visually estimated at >75 %. The interventricular septum is flattened in systole and diastole consistent with right ventricular pressure and volume overload.   2. No pericardial effusion seen.   3. Large right pleural effusion noted.   4.Technically difficult image quality.   5. The right ventricular cavity is enlarged in size and right ventricular systolic function is reduced. Right ventricular systolic function is reduced with apical sparing    < end of copied text >

## 2024-11-30 NOTE — PROGRESS NOTE ADULT - ASSESSMENT
54 year old male with PMHx lung cancer treated with chemo in [place] (last treatment 2 weeks ago) advised to come in by oncology RN for trouble breathing and tachycardia. Patient speaking in short sentences, requesting wife at bedside provide history. Per wife Zayra, patient has been short of breath since last chemo treatment, has to sit up in chair/bed, cannot lay down without worsening symptoms. Patient utilizes 4L O2 at baseline and up to 8L when moving. Wife states patient appeared worse today, called oncology office and was advised to come into ED. Wife also notes patient with poor appetite and energy since chemotherapy. States patient's HR has been in the 120s over last 2 weeks, including during last chemo session which staff was not concerned about. Denies fevers, nausea, vomiting, diarrhea, chest pain. CXR with possible Left pleural effusion. CT surgery called to evaluate. S/p RT PTC on 11/30
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
## 2024-11-30 NOTE — CONSULT NOTE ADULT - ASSESSMENT
55 y/o male with small cell lung cancer diagnosed in March 2024 ( [facility])- extensive stage at diagnosis. S/p  chemoimmunotherapy with etoposide, carboplatin and atezolizumab x 4 cycles - completed in June 2024.  October 2024 - progression of disease PET 10/24/24 : worsening mediastinal and hilar adenopathy, multiple bilat lung mets, pericardial mets and bone mets.  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  Started second line chemotherapy Lurbinectedin ( Zepzelca) on 11/15/24 ( given every 21 days- next due Dec 6)   Admitted with acute on chronic respiratory failure in setting of progressing small cell lung cancer  Antibiotics for possible postobstructive pneumonia.  Not neutropenic ( had neulasta)  S/p R chest tube for likley malignant pleural effusion.  Hemoptysis due to disease.   55 y/o male with small cell lung cancer diagnosed in March 2024 ( [facility]). [place] to have extensive stage at diagnosis.  S/p  chemoimmunotherapy with etoposide, carboplatin and atezolizumab x 4 cycles - completed in June 2024.  October 2024 - progression of disease.  PET 10/24/24 : worsening mediastinal and hilar adenopathy, multiple bilat lung mets, pericardial mets and bone mets. Adenopathy  narrows SVC and also narrowing  trachea and lobar bronchi.     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  Started second line chemotherapy Lurbinectedin ( Zepzelca) on 11/15/24 ( given every 21 days- next due Dec 6) Oncologist Dr Rasheed at  [facility].   Admitted with acute on chronic respiratory failure in setting of progressing small cell lung cancer    On antibiotics for possible postobstructive pneumonia.  Not neutropenic ( had neulasta)  S/p R chest tube for likely  malignant pleural effusion.  Hemoptysis due to disease ( had before recent chemotherapy)  Too early to expect response to chemotherapy ( only one cycle 2 weeks ago) but concern  re rapid progression. Very symptomatic.  He is aware that his disease is aggressive and no curative treatment options exist but he wants to continue treatment.    Will ask radiation oncology to evaluate re option of palliative RT to central lung/ amberly mass to relieve bronchial / tracheal obstruction and compression of SVC.   If no RT option- continue chemotherapy- lubrinectedin cycle 2 next week.    Palliative care consultation re symptom management and GOC.    Long d/w patient and his wife in [place].   [facility] will follow up on Monday.

## 2024-12-01 NOTE — PROGRESS NOTE ADULT - ASSESSMENT
A: PAF, Metastatic Lung CA.   P: Cont supportive care. May add CCB or BB for rate control in case PAF recurs.  Regarding OAC, after d/w pt, since he is having ongoing hemoptysis related to lung malignancy, it is not advisable to start OAC at this point in time. Call back as needed.

## 2024-12-01 NOTE — PROGRESS NOTE ADULT - ASSESSMENT
54M PMH SCLC (02/2024) via biopsy of L neck mass on chemo, former heavy smoker (2ppd x 40 years) , anemia who presented 11/29/24 with respiratory failure, SOB, found with severe SVC compression, SERG consolidation, B/L pleural effusions, s/p R PTC 11/30/24    Impression:  - Severe SVC compression, impending SVC syndrome  - Likely post-obstructive PNA  - Heavy former smoker, ?COPD  - Metastatic SCLC on chemo  - B/L pleural effusions - s/p R PTC 11/30/24  - SOB, increased work of breathing    Recommendations:  - Heme/onc following  - S/p R PTC placement 11/30/24; f/u cultures, cell count, cytopathology  - Pt does feel some symptom improvement s/p R PTC  - DuoNebs - continue  - C/w broad spectrum ABx; f/u BCx  - Pt was trialed on HFNC but did not tolerate; would have available at bedside if possible  - If WOB worsens would consider BPAP, may need mild sedative to maintain compliance  - Please f/u with oncology/rad-onc re: palliative radiation for SVC compression  - Would check B/L LE dopplers r/o VTE; of note he does have anemia; if +for VTE may need to consider IVC filter  - Prognosis appears to be poor  D/w Dr. Deedee Benitez MD, [facility]  , Pulmonary & Critical Care Medicine  [facility]  Pulmonary and Sleep Medicine at [place]  [address]  [place], [place] [address]  T: [phone]  F: [phone]

## 2024-12-01 NOTE — PROGRESS NOTE ADULT - ASSESSMENT
54 year old male with PMHx lung cancer treated with chemo in [place] (last treatment 2 weeks ago) advised to come in by oncology RN for trouble breathing and tachycardia. Patient speaking in short sentences, requesting wife at bedside provide history. Per wife Zayra, patient has been short of breath since last chemo treatment, has to sit up in chair/bed, cannot lay down without worsening symptoms. Patient utilizes 4L O2 at baseline and up to 8L when moving. Wife states patient appeared worse today, called oncology office and was advised to come into ED. Wife also notes patient with poor appetite and energy since chemotherapy. States patient's HR has been in the 120s over last 2 weeks, including during last chemo session which staff was not concerned about. Denies fevers, nausea, vomiting, diarrhea, chest pain. CXR with possible Left pleural effusion. CT surgery called to evaluate. S/p RT PTC on 11/30
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
## 2024-12-01 NOTE — PROVIDER CONTACT NOTE (OTHER) - ACTION/TREATMENT ORDERED:
Left NIV on S/B next to patient in Critical 6. Informed MD Hill of situation.
Spoke with medicine edwin Martínez to given PRN percocet early (pt requested), given pt condition. Will continue to monitor.

## 2024-12-01 NOTE — PROVIDER CONTACT NOTE (OTHER) - ASSESSMENT
Pt tachycardic, anxious and uncomfortable, complaining of severe pain sitting in the chair. Pt requesting pain medication. VSS.

## 2024-12-01 NOTE — PROGRESS NOTE ADULT - SUBJECTIVE AND OBJECTIVE BOX
Patient is a 54y old  Male who presents with a chief complaint of worsening SOB (30 Nov 2024 14:24)      C/O SOB on mild exertion,no cp.c/o fatigue.has chronic hemoptysis  poor appetite  REVIEW OF SYSTEMS: All systems are reviewed and found to be negative except above    MEDICATIONS  (STANDING):  albuterol/ipratropium for Nebulization 3 milliLiter(s) Nebulizer every 6 hours  ascorbic acid 500 milliGRAM(s) Oral daily  azithromycin  IVPB 500 milliGRAM(s) IV Intermittent every 24 hours  cefepime  Injectable.      cefepime  Injectable. 2000 milliGRAM(s) IV Push every 8 hours  multivitamin 1 Tablet(s) Oral daily    MEDICATIONS  (PRN):  acetaminophen     Tablet .. 650 milliGRAM(s) Oral every 6 hours PRN Temp greater or equal to 38C (100.4F), Mild Pain (1 - 3)  ALPRAZolam 0.5 milliGRAM(s) Oral at bedtime PRN sleep  aluminum hydroxide/magnesium hydroxide/simethicone Suspension 30 milliLiter(s) Oral every 4 hours PRN Dyspepsia  melatonin 3 milliGRAM(s) Oral at bedtime PRN Insomnia  morphine  - Injectable 1 milliGRAM(s) IV Push three times a day PRN Severe Pain (7 - 10)  ondansetron Injectable 4 milliGRAM(s) IV Push every 8 hours PRN Nausea and/or Vomiting  oxycodone    5 mG/acetaminophen 325 mG 2 Tablet(s) Oral every 6 hours PRN Severe Pain (7 - 10)  oxycodone    5 mG/acetaminophen 325 mG 1 Tablet(s) Oral every 6 hours PRN Moderate Pain (4 - 6)      CAPILLARY BLOOD GLUCOSE      POCT Blood Glucose.: 112 mg/dL (01 Dec 2024 12:48)    I&O's Summary    30 Nov 2024 07:01  -  01 Dec 2024 07:00  --------------------------------------------------------  IN: 150 mL / OUT: 2120 mL / NET: -1970 mL        PHYSICAL EXAM:  Vital Signs Last 24 Hrs  T(C): 36.6 (01 Dec 2024 07:35), Max: 36.8 (30 Nov 2024 19:23)  T(F): 97.9 (01 Dec 2024 07:35), Max: 98.3 (01 Dec 2024 00:10)  HR: 120 (01 Dec 2024 14:00) (116 - 135)  BP: 112/86 (01 Dec 2024 14:00) (92/55 - 126/87)  BP(mean): 95 (01 Dec 2024 14:00) (67 - 101)  RR: 16 (01 Dec 2024 14:00) (16 - 24)  SpO2: 99% (01 Dec 2024 14:00) (96% - 100%)    Parameters below as of 01 Dec 2024 14:00  Patient On (Oxygen Delivery Method): nasal cannula w/ humidification  O2 Flow (L/min): 6      CONSTITUTIONAL: NAD,  EYES: PERRLA; conjunctiva and sclera clear  ENMT: Moist oral mucosa,   RESPIRATORY: Normal respiratory effort; crackles  to auscultation bilaterally  CARDIOVASCULAR: Regular rate and rhythm, normal S1 and S2, no murmur   EXTS: No lower extremity edema; Peripheral pulses are 2+ bilaterally  ABDOMEN: Nontender to palpation, normoactive bowel sounds, no rebound/guarding;   MUSCLOSKELETAL:  no joint swelling or tenderness to palpation  PSYCH: affect appropriate  NEUROLOGY: A+O to person, place, and time; CN 2-12 are intact and symmetric; no gross sensory deficits;       LABS:                        9.9    4.04  )-----------( 451      ( 01 Dec 2024 04:33 )             30.5     12-01    134[L]  |  93[L]  |  27.3[H]  ----------------------------<  118[H]  4.9   |  28.0  |  0.62    Ca    9.7      01 Dec 2024 04:33  Phos  3.6     12-01  Mg     1.9     12-01        CARDIAC MARKERS ( 01 Dec 2024 04:33 )  x     / x     / x     / x     / 3.2 ng/mL      Urinalysis Basic - ( 01 Dec 2024 04:33 )    Color: x / Appearance: x / SG: x / pH: x  Gluc: 118 mg/dL / Ketone: x  / Bili: x / Urobili: x   Blood: x / Protein: x / Nitrite: x   Leuk Esterase: x / RBC: x / WBC x   Sq Epi: x / Non Sq Epi: x / Bacteria: x        Culture - Fungal, Body Fluid (collected 30 Nov 2024 11:00)  Source: Pleural Fl  Preliminary Report (01 Dec 2024 09:50):    No growth    Culture - Body Fluid with Gram Stain (collected 30 Nov 2024 11:00)  Source: Pleural Fl  Gram Stain (30 Nov 2024 19:58):    No polymorphonuclear leukocytes per low power field    No organisms seen per oil power field  Preliminary Report (01 Dec 2024 08:49):    No growth    Culture - Blood (collected 29 Nov 2024 12:52)  Source: .Blood BLOOD  Preliminary Report (30 Nov 2024 19:01):    No growth at 24 hours        RADIOLOGY & ADDITIONAL TESTS:  Results Reviewed:

## 2024-12-01 NOTE — PROGRESS NOTE ADULT - SUBJECTIVE AND OBJECTIVE BOX
S: Patient seen and examined at bed side, less sob, refers having ongoing mild hemoptysis. No cp. Tele back in S tach.   O: Regular and fast heart sounds, no leg edema. No focal neuro deficits.

## 2024-12-01 NOTE — PROGRESS NOTE ADULT - SUBJECTIVE AND OBJECTIVE BOX
[facility]  INFECTIOUS DISEASES at [place] / [place] / [place]  =======================================================                              Darren Churchill MD                              Professor Emeritus:  Dr Yakov Liriano MD            Diplomates American Board of Internal Medicine & Infectious Diseases                                   Tel  [phone] Fax [phone]                                  Hospital Consult line:  [phone]  =======================================================    Follow Up: Post obstructive pneumonia, progression of metastatic lung cancer     Interval History/ROS: Seen at bedside. S/p chest tube insertion yesterday. On NC O2.     REVIEW OF SYSTEMS  Unchanged from prior     Allergies  No Known Allergies    ANTIMICROBIALS:    azithromycin  IVPB 500 every 24 hours  cefepime  Injectable.    cefepime  Injectable. 2000 every 8 hours    OTHER MEDS: MEDICATIONS  (STANDING):  acetaminophen     Tablet .. 650 every 6 hours PRN  albuterol/ipratropium for Nebulization 3 every 6 hours  ALPRAZolam 0.5 at bedtime PRN  aluminum hydroxide/magnesium hydroxide/simethicone Suspension 30 every 4 hours PRN  melatonin 3 at bedtime PRN  morphine  - Injectable 1 three times a day PRN  ondansetron Injectable 4 every 8 hours PRN  oxycodone    5 mG/acetaminophen 325 mG 2 every 6 hours PRN  oxycodone    5 mG/acetaminophen 325 mG 1 every 6 hours PRN    Vital Signs Last 24 Hrs  T(F): 97.9 (12-01-24 @ 07:35), Max: 99.1 (11-29-24 @ 16:05)    Vital Signs Last 24 Hrs  HR: 116 (12-01-24 @ 10:00) (116 - 135)  BP: 101/63 (12-01-24 @ 10:00) (90/60 - 126/87)  RR: 18 (12-01-24 @ 10:00)  SpO2: 98% (12-01-24 @ 10:00) (96% - 100%)  Wt(kg): --    EXAM:  General: Patient in mild respiratory distress   HEENT: NCAT   CV: S1+S2   Lungs: On NC O2, mild respiratory distress   S/p chest tube x 1   Abd: Soft, nontender  Ext: No cyanosis  Neuro: Alert and oriented  Skin: No rash     Labs:                        9.9    4.04  )-----------( 451      ( 01 Dec 2024 04:33 )             30.5     12-01    134[L]  |  93[L]  |  27.3[H]  ----------------------------<  118[H]  4.9   |  28.0  |  0.62    Ca    9.7      01 Dec 2024 04:33  Phos  3.6     12-01  Mg     1.9     12-01    TPro  7.4  /  Alb  3.8  /  TBili  0.7  /  DBili  x   /  AST  32  /  ALT  28  /  AlkPhos  76  11-29    WBC Trend:  WBC Count: 4.04 (12-01-24 @ 04:33)  WBC Count: 3.58 (11-30-24 @ 06:44)  WBC Count: 3.36 (11-29-24 @ 12:52)    Creatine Trend:  Creatinine: 0.62 (12-01)  Creatinine: 0.51 (11-30)  Creatinine: 0.59 (11-29)    Liver Biochemical Testing Trend:  Alanine Aminotransferase (ALT/SGPT): 28 (11-29)  Alanine Aminotransferase (ALT/SGPT): 205 *H* (11-20)  Alanine Aminotransferase (ALT/SGPT): 405 *H* (11-18)  Alanine Aminotransferase (ALT/SGPT): 11 (11-15)  Alanine Aminotransferase (ALT/SGPT): 10 (06-24)  Aspartate Aminotransferase (AST/SGOT): 32 (11-29-24 @ 12:52)  Aspartate Aminotransferase (AST/SGOT): 86 (11-20-24 @ 15:21)  Aspartate Aminotransferase (AST/SGOT): 186 (11-18-24 @ 15:31)  Aspartate Aminotransferase (AST/SGOT): 41 (11-15-24 @ 16:18)  Aspartate Aminotransferase (AST/SGOT): 25 (06-24-24 @ 12:41)  Bilirubin Total: 0.7 (11-29)  Bilirubin Total: 1.0 (11-20)  Bilirubin Total: 1.0 (11-18)  Bilirubin Total: 0.6 (11-15)  Bilirubin Total: 0.3 (06-24)    Trend LDH  12-01-24 @ 04:33  1172[H]  04-23-24 @ 07:20  273[H]  04-22-24 @ 06:35  288[H]  04-21-24 @ 06:35  303[H]  04-20-24 @ 06:30  289[H]    Urinalysis Basic - ( 01 Dec 2024 04:33 )    Color: x / Appearance: x / SG: x / pH: x  Gluc: 118 mg/dL / Ketone: x  / Bili: x / Urobili: x   Blood: x / Protein: x / Nitrite: x   Leuk Esterase: x / RBC: x / WBC x   Sq Epi: x / Non Sq Epi: x / Bacteria: x    MICROBIOLOGY:  Vancomycin Level, Trough: 10.8 (12-01 @ 04:33)    MRSA PCR Result.: NotDetec (12-01-24 @ 07:40)    Culture - Fungal, Body Fluid (collected 30 Nov 2024 11:00)  Source: Pleural Fl  Preliminary Report:    No growth    Culture - Body Fluid with Gram Stain (collected 30 Nov 2024 11:00)  Source: Pleural Fl  Preliminary Report:    No growth    Culture - Blood (collected 29 Nov 2024 12:52)  Source: .Blood BLOOD  Preliminary Report:    No growth at 24 hours    Rapid RVP Result: NotDetec (11-29 @ 12:52)    Procalcitonin: 0.13 (12-01)    Lactate Dehydrogenase, Serum: 1172 (12-01)    Troponin T, High Sensitivity Result: 13 (12-01)  Troponin T, High Sensitivity Result: 12 (11-29)  Troponin T, High Sensitivity Result: 11 (11-29)    RADIOLOGY:  imaging below personally reviewed    < from: CT Angio Chest PE Protocol w/ IV Cont (11.29.24 @ 15:23) >  IMPRESSION:    1.  No pulmonary embolus, however, the segmental and subsegmental   divisions are not well evaluated.  2.  The enlarged chest lymph nodes are confluent with perihilar   opacities, largest of which is in the left upper lobe that measures 7.2 x   4.5 cm. These findings are concerningfor the reported primary lung   malignancy.  3.  The enlarged chest lymph nodes severely narrow the SVC.  4.  The enlarged lymph nodes and perihilar opacities narrowing of the   trachea and lobar bronchi.  5.  Bilateral pleural effusions (right greater than left).    < end of copied text >

## 2024-12-01 NOTE — PROGRESS NOTE ADULT - ASSESSMENT
54 M PMH metastatic SCLC currently on Lurbinectedin (last treatment 2 weeks prior), who presented to the ED with complaints of SOB and elevated heart rate.     Immunocompromised individual with SCLC, last chemotherapy 2 weeks prior    Progressive SOB  Acute hypoxic respiratory failure on supplemental O2   Afebrile  Leukopenia  RVP negative  Abnormal imaging   CT Chest showed no PE, diffuse LAD, perihilar opacities (largest in SERG 7.2 x 4.5cm), severe SVC narrowing 2/2 LAD, narrowing of trachea and bronchi 2/2 LAD and perihilar opacities, b/l pleural effusions  Progression of metastatic SCLC with superimposed post obstructive pneumonia     Plan:   MRSA PCR negative; can stop Vancomycin IV    Continue Cefepime IV  Continue Azithromycin IV  Check Legionella Ur Ag; if negative can stop Azithromycin   Check sputum culture   Serum procalcitonin 0.13  Follow up blood culture  Follow up pleural fluid studies, suspect malignant effusion   Plan for Radiation Oncology evaluation   Poor prognosis

## 2024-12-01 NOTE — PROVIDER CONTACT NOTE (OTHER) - SITUATION
Pt is a 53 y/o male pmhx small cell lung cancer, anemia, acute hypoxemic resp failure. Admitted with pneumonia and pleural effusion.
Pt was ordered NIV for WOB in ER

## 2024-12-01 NOTE — PROGRESS NOTE ADULT - ASSESSMENT
54M with PMH of Chronic Hypoxic Respiratory Failure on 4 l nc and Metastatic SCLC recently initiated cycle of Zepzelca at [facility]/[place] 2 weeks ago presenting to ER with tachycardia, SOB and hemoptysis since initiation of chemo. In ER CTA demonstrated Significant diffuse LAD, severe SVC Compression, SERG consolidation/Mass, Bilateral pleural effusions. Requiring oxygen via NC 6LPM. Also noted to be sinus tachycardic to 120's-130's with non ischemic ekg and negative serial troponins. afib rvr @190 am s/p iv lopressor. now NST.    New Pafib  -s/p iv lopressor. converted to nsr  -low dose bb with hold parameter  -dw cardiology No ac rec now,given hemoptysis,advance Lung ca  -check tsh  -tte on 11/29 reviewd.     Acute on Chronic Respiratory Failure mulifactorial in setting of progression of metastatic SCLC, bilateral pleural effusions R>L,   -s/p rt chest tube  -f/u ct surgery rec  -oxygen supplement on 6 l   -abg reviewed  -ct surgery for thoracentesis  -start vanc, cefepime, azithromycin to cover obstructive pna  - sputum culture  -urine strep  -urine legionella  -MRSA PCR  -viral RVP negative  -f/u final blood culture  -pulmonary and ID consulted  -Notified ct surgery    SCLC  -oncology consult  - progressive disease per pt/wife at bedside. Pt was initially started on chemo march/april 2024 but no improvement which was stoppled but started chemo again 2wks ago for progression of disease.        Sinus tachycardia, multifactorial in setting of SCLC, SVC compression, pain, toxicity from recent chemotherapy, component of anemia  -Echo ef >75%,RV enlargement with reduce systolic function.  -f/u cardio rec        Superior vena cava stenosis.   - Mechanical due to nodes  -no immediate cardiac intervention needed  -seen by Vascular team    leukopenia but not neutropenic 2/2 infection vs chemotoxicity  -continue to trend  -treatment as above    Acute blood loss anemia  -Normocytic  -possibly due to hemoptysis as noted at bedside and/or 2/2 chemotherapy  -continue to trend  -transfuse for hemoglobin <7  -Pulmonary consult.      Constipation  -miralax   -Senna    Poor appetite  -nutrition consult  -will start Marinol  -pt was on Marinol oil which was helping him per wife/pt      DVT prophylaxis  -SCD  -no anticoagulants due to acute blood loss anemia.     Dispo: acute,pending improvement of respiratory status.  Long dw pt and wife at bedside. Understood Pt is high risk of intubation. over all prognosis guarded   DW cardiology     54M with PMH of Chronic Hypoxic Respiratory Failure on 4 l nc and Metastatic SCLC recently initiated cycle of Zepzelca at [facility]/[place] 2 weeks ago presenting to ER with tachycardia, SOB and hemoptysis since initiation of chemo. In ER CTA demonstrated Significant diffuse LAD, severe SVC Compression, SERG consolidation/Mass, Bilateral pleural effusions. Requiring oxygen via NC 6LPM. Also noted to be sinus tachycardic to 120's-130's with non ischemic ekg and negative serial troponins. afib rvr @190 am s/p iv lopressor. now NST.    New Pafib  -s/p iv lopressor. converted to nsr  -low dose bb with hold parameter  -dw cardiology No ac rec now,given hemoptysis,advance Lung ca  -check tsh  -tte on 11/29 reviewd.     Acute on Chronic Respiratory Failure mulifactorial in setting of progression of metastatic SCLC, bilateral pleural effusions R>L,   -s/p rt chest tube  -f/u ct surgery rec  -oxygen supplement on 6 l   -abg reviewed  -ct surgery for thoracentesis  -start vanc, cefepime, azithromycin to cover obstructive pna  - sputum culture  -urine strep  -urine legionella  -MRSA PCR  -viral RVP negative  -f/u final blood culture  -pulmonary and ID consulted  -Notified ct surgery    SCLC  -f/u oncology rec,plan for radiation /onc eval  - progressive disease per pt/wife at bedside. Pt was initially started on chemo march/april 2024 but no improvement which was stoppled but started chemo again 2wks ago for progression of disease.        Sinus tachycardia, multifactorial in setting of SCLC, SVC compression, pain, toxicity from recent chemotherapy, component of anemia  -Echo ef >75%,RV enlargement with reduce systolic function.  -f/u cardio rec        Superior vena cava stenosis.   - Mechanical due to nodes  -no immediate cardiac intervention needed  -seen by Vascular team    leukopenia but not neutropenic 2/2 infection vs chemotoxicity  -continue to trend  -treatment as above    Acute blood loss anemia  -Normocytic  -possibly due to hemoptysis as noted at bedside and/or 2/2 chemotherapy  -continue to trend  -transfuse for hemoglobin <7  -Pulmonary consult.      Constipation  -miralax   -Senna    Poor appetite  -nutrition consult  -will start Marinol  -pt was on Marinol oil which was helping him per wife/pt      DVT prophylaxis  -SCD  -no anticoagulants due to acute blood loss anemia.     Dispo: acute,pending improvement of respiratory status.  Long dw pt and wife at bedside. Understood Pt is high risk of intubation. over all prognosis guarded   DW cardiology

## 2024-12-02 NOTE — DISCHARGE NOTE FOR THE EXPIRED PATIENT - OTHER SIGNIFICANT FINDINGS
Unresponsive to sternal/verbal stimuli  No carotid pulses, no peripheral pulses  Pupils nonreactive to light, no corneal reflex  No spontaneous respirations  No heart tones

## 2024-12-02 NOTE — PROGRESS NOTE ADULT - PROVIDER SPECIALTY LIST ADULT
Cardiology
Hospitalist
Hospitalist
Heme/Onc
Hospitalist
Infectious Disease
Thoracic Surgery
Thoracic Surgery
Pulmonology
Pulmonology
Thoracic Surgery
Thoracic Surgery

## 2024-12-02 NOTE — DISCHARGE NOTE FOR THE EXPIRED PATIENT - HOSPITAL COURSE
54M with PMH of Chronic Hypoxic Respiratory Failure on 4 l nc and Metastatic SCLC recently initiated cycle of Zepzelca at [facility]/[place] 2 weeks ago presented to ER with tachycardia, SOB and hemoptysis since initiation of chemo. In ER CTA demonstrated Significant diffuse LAD, severe SVC Compression, SERG consolidation/Mass, Bilateral pleural effusions. Requiring oxygen via NC 6LPM. Also noted to be sinus tachycardic to 120's-130's with non ischemic ekg and negative serial troponins. afib rvr @190 am s/p iv lopressor.   On 12/2 Patient is OOB/CH color pale gray experiencing severe Dyspnea, having difficulty speaking on 8L O2 NC. /generalized and B/L Chest pain worse when coughing. Patient in severe respiratory distress asking to make him comfortable, signed DNR/DNI wife remained at his bedside. Pt made full comfort measures and passed with family at bedside on 12/2/24 at 22:51.     54M with PMH of Chronic Hypoxic Respiratory Failure on 4L NC and Metastatic SCLC recently initiated cycle of Zepzelca at [facility]/[place] 2 weeks ago presented to ER on 11/29 with tachycardia, SOB and hemoptysis since initiation of chemo. In ER CTA demonstrated Significant diffuse LAD, severe SVC Compression, SERG consolidation/Mass, Bilateral pleural effusions. Requiring oxygen via NC 6LPM. Also noted to be sinus tachycardic to 120's-130's with non ischemic ekg and negative serial troponins. afib rvr @190 am s/p iv lopressor. Admitted to medicine team for acute on chronic respiratory failure in setting of metastatic SCLC, bilateral pleural effusions and left upper lobe pneumonia. Treated with IV antibiotics. RT PTC placed by CT surgery and pt diuresed.   On 12/2 Patient is OOB/CH color pale gray experiencing severe Dyspnea, having difficulty speaking on 8L O2 NC. /generalized and B/L Chest pain worse when coughing. Patient in severe respiratory distress asking to make him comfortable, signed DNR/DNI wife remained at his bedside. Pt made full comfort measures and passed with family at bedside on 12/2/24 at 22:51.

## 2024-12-02 NOTE — CONSULT NOTE ADULT - ASSESSMENT
55 y/o male with small cell lung cancer diagnosed in March 2024 ( [facility]). Carmine to have extensive stage at diagnosis.  S/p  chemoimmunotherapy with etoposide, carboplatin and atezolizumab x 4 cycles - completed in June 2024. Refused maintainance Atezolizumab   October 2024 - progression of disease.  PET 10/24/24 : worsening mediastinal and hilar adenopathy, multiple bilat lung mets, pericardial mets and bone mets. Adenopathy  narrows SVC and also narrowing  trachea and lobar bronchi.     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  Started second line chemotherapy Lurbinectedin ( Zepzelca) on 11/15/24 ( given every 21 days- next due Dec 6) Oncologist Dr Rasheed at  [facility].     11/29/24:  CT CHEST:   - No central pulmonary embolus, segmental and subsegmental  divisions are not well evaluated.  -  The enlarged chest lymph nodes are confluent with perihilar  opacities, largest of which is in the left upper lobe that measures 7.2 x   4.5 cm. These findings are concerning for the reported primary lung  malignancy.  -   The enlarged chest lymph nodes severely narrow the SVC.  -  The enlarged lymph nodes and perihilar opacities narrowing of the  trachea and lobar bronchi.  -   Bilateral pleural effusions (right greater than left).    #Extensive stage Small cell Lung carcinoma   # On Lurbinectin  # SOB     S/p R chest tube for likely  malignant pleural effusion.  Hemoptysis due to disease ( had before recent chemotherapy)  - radiation oncology to evaluate for palliative RT to central lung/ amberly mass to relieve bronchial / tracheal obstruction  compression of SVC.  - S/p Thoracic surgery RT PTC placed 11/30 with initial output 250cc serous fluid  - cefepime, azithromycin to cover obstructive pna. dc vanco neg mrsa  - Palliative care on board   discussion held with wife and sons at bedside on the aggressive nature of the disease.   Initially wife / patient discussed comfort are with palliative team and hospitalist however later stated that they were still considering. Discussed overall poor guarded prognosis.      Radiation  Oncology   evaluation has taken place  for palliative RT  . Due to essentially terminal status of patient , huge bulk of disease and logistics of transport to [facility] for RT treatment planning and treatment , ultimate benefit of palliative radiotherapy at this point is diminishingly small and would likely significantly diminish patient's quality of life . Current decision by patient  for comfort care only is absolutely appropriate which I wholeheartedly endorse. I do not recommend palliative radiotherapy  . I have had a detailed and lengthy discussion with the patient's family regarding his grave prognosis and that comfort care at this point is the most humane course of care. They understood and thanked me for my consideration and advice.
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
## 2024-12-02 NOTE — CONSULT NOTE ADULT - CONSULT REASON
53 y/o man with Cisplatin resistant Small Cell Lung cancer with progressive disease despite second line systemic agent with worsening respiratory function in large part due to bulky mediastinal adenopathy resulting in SVC syndrome . For evaluation and consideration of palliative radiotherapy.
Concern for pneumonia
SVC compression
small cell lung cancer on chemotherapy
Respiratory failure, SCLC
Shortness of breath
Metastatic Lung cancer  Severe dyspnea Acute Hypoxic Respiratory Failure    Patient asking to be made comfortable

## 2024-12-02 NOTE — CONSULT NOTE ADULT - SUBJECTIVE AND OBJECTIVE BOX
54 year old male with a past medical history of Small Cell Lung cancer via biopsy of L neck mass on 03/24, initally was on zarxio, Per wife patient is now on zepzelca. Since chemotherapy 2 weeks ago patient has been tachycardic to the 120s and has had episodes of hemoptysis. Patient now with dyspnea requiring 8L on admission. Patient was unable to lay in bed and had to sit up. Per wife at bedise patient appeared worse today and was advised by oncology office to come into ED. In ER CTA showing diffuse LAD, severe SVC compression, SERG consolidation/mass, bilateral pleural effusions. Patient in Sinus tachycardia. At bedside patient having difficulty speaking due to lack of oxygen. Patient on 4L which per wife is how much he uses at home.  Patient can only mouth words and is bent over during exam. Patient wife states he last had a bowel movement several days ago and there was some blood during wiping but not with bowel movement. Wife states that patient has been coughing up blood for 2 weeks. Patient showed sputum to providers and it is bright red with clots. Patient complains of bilateral chest pain worse with coughing. Patient denies fevers, nausea, vomiting, dairrhea.     · ADVANCED CARE PLANNING: 	Voluntary discussion occurred addressing advanced care planning Monday 12/02/2024.  · Conversation	Diagnosis; Prognosis; MOLST Discussed; Hospice Referral; Palliative Care Referral  · Conversation Details	Called by RN, patient tachypneic, anxious, wife at bedside. States he does not want to suffer. Requesting he received medications to help him feel mre comfortable. Understands that medications to treat symptoms may also shorten his life. MOLST discussed. Patient signed DNR/DNI, requesting symptom management. Wife at bedside verbalized understanding of patient's wishes. Palliative Care at bedside.    What Matters Most To Patient and Family   · What matters most to patient and family	That he does not feel pain or further suffering    Personal Advance Directives Treatment Guidelines:   Treatment Guidelines   · Decision Maker	Patient  · Treatment Guidelines	DNR; DNI; Comfort measures only    MOLST   · Completed	02-Dec-2024      REVIEW OF SYSTEMS:  Other Review of Systems: All other review of systems negative, except as noted in HPI      ALLERGIES        Allergies:  	No Known Allergies:     HOME MEDICATIONS:  * Patient Currently Takes Medications as of 13-May-2024 15:18 documented in Structured Notes  · 	Neupogen SingleJect 480 mcg/0.8 mL injectable solution: 480 microgram(s) subcutaneously once a day  · 	allopurinol 300 mg oral tablet: 1 tab(s) orally once a day  · 	ascorbic acid 500 mg oral tablet: 1 tab(s) orally once a day  · 	loratadine 10 mg oral tablet: 1 tab(s) orally once a day  · 	Multiple Vitamins oral tablet: 1 tab(s) orally once a day  MEDICATIONS  (STANDING):  ascorbic acid 500 milliGRAM(s) Oral daily  azithromycin  IVPB 500 milliGRAM(s) IV Intermittent every 24 hours  cefepime  Injectable.      cefepime  Injectable. 2000 milliGRAM(s) IV Push every 8 hours  dronabinol 2.5 milliGRAM(s) Oral daily  metoprolol tartrate Injectable 2.5 milliGRAM(s) IV Push every 6 hours  multivitamin 1 Tablet(s) Oral daily  sodium chloride 0.9%. 1000 milliLiter(s) (100 mL/Hr) IV Continuous <Continuous>    MEDICATIONS  (PRN):  acetaminophen     Tablet .. 650 milliGRAM(s) Oral every 6 hours PRN Temp greater or equal to 38C (100.4F), Mild Pain (1 - 3)  ALPRAZolam 0.5 milliGRAM(s) Oral at bedtime PRN sleep  aluminum hydroxide/magnesium hydroxide/simethicone Suspension 30 milliLiter(s) Oral every 4 hours PRN Dyspepsia  levalbuterol Inhalation 1.25 milliGRAM(s) Inhalation every 8 hours PRN shortness of breath or wheezing  melatonin 3 milliGRAM(s) Oral at bedtime PRN Insomnia  morphine  - Injectable 1 milliGRAM(s) IV Push three times a day PRN Severe Pain (7 - 10)  ondansetron Injectable 4 milliGRAM(s) IV Push every 8 hours PRN Nausea and/or Vomiting  oxycodone    5 mG/acetaminophen 325 mG 2 Tablet(s) Oral every 6 hours PRN Severe Pain (7 - 10)  oxycodone    5 mG/acetaminophen 325 mG 1 Tablet(s) Oral every 6 hours PRN Moderate Pain (4 - 6)         SOCIAL HISTORY:  · Substance use	Yes  · Alcohol use	denies  · Substance use	denies  · Tobacco use	Tobacco use for 40 years quit after diagnosis of SCLC in march  · Social History (marital status, living situation, occupation, and sexual history)	    Vital Signs Last 24 Hrs  T(C): 36.4 (02 Dec 2024 07:53), Max: 36.6 (01 Dec 2024 15:44)  T(F): 97.5 (02 Dec 2024 07:53), Max: 97.9 (01 Dec 2024 15:44)  HR: 135 (02 Dec 2024 08:00) (116 - 139)  BP: 138/98 (02 Dec 2024 08:00) (92/55 - 148/87)  BP(mean): 103 (02 Dec 2024 08:00) (67 - 103)  RR: 18 (02 Dec 2024 08:00) (16 - 24)  SpO2: 93% (02 Dec 2024 08:00) (91% - 100%)    IN chair   CVS S1S2   RS + breath sounds corase   Abd; Soft  non tended   Ext; No pedal edema    Parameters below as of 02 Dec 2024 08:00  Patient On (Oxygen Delivery Method): nasal cannula  O2 Flow (L/min): 7    Physical Exam: GENERAL: Cachetic, leaning back in bed with shallow  breathing,sedated  on a Morphine drip.  HEAD: NC/AT  EYES: PERRLA, EOMI, Non-icteric  ENT: Mucous membranes moist, neck supple  NEURO: No focal deficits, moving all extremities spontaneously, A&Ox3, no dysarthria, CN II-XII grossly intact  PSYCH: sedated  RESP: bibasilar crackles bilaterally lung sounds diminished on right, labored breathing.   CVS: RRR, no murmur appreciated  GI: Soft, non-tender, non-distended, no organomegaly, no appreciable masses, +bs all 4 quadrants  : No ellis, no suprapubic tenderness  EXTREMITIES: Nontender, no clubbing, cyanosis, or edema  SKIN: No rashes or lesions      LABORATORIES:  Virology:    29-Nov-2024 12:52, Respiratory Viral Panel with COVID-19 by HUMBERTO  Rapid RVP Result: NotDetec, [NotDetec]  General Chemistry:    29-Nov-2024 12:52, Comprehensive Metabolic Panel  Sodium: 135, [135 - 145 mmol/L]  Potassium: 4.2, [3.5 - 5.3 mmol/L]  Chloride:   92, [96 - 108 mmol/L], Chloride reference range changed from ..10/26/2022  Carbon Dioxide: 28.0, [22.0 - 29.0 mmol/L]  Anion Gap: 15, [5 - 17 mmol/L]  Blood Urea Nitrogen:   25.6, [8.0 - 20.0 mg/dL]  Creatinine: 0.59, [0.50 - 1.30 mg/dL]  Glucose:   137, [70 - 99 mg/dL]  Calcium: 10.2, [8.4 - 10.5 mg/dL]  Protein Total: 7.4, [6.6 - 8.7 g/dL]  Albumin: 3.8, [3.3 - 5.2 g/dL]  Bilirubin Total: 0.7, [0.4 - 2.0 mg/dL]  Alkaline Phosphatase: 76, [40 - 120 U/L]  Aspartate Aminotransferase (AST/SGOT): 32, [ - <=39 U/L]  Alanine Aminotransferase (ALT/SGPT): 28, [ - <=40 U/L]  eGFR: 116, [>=60 mL/min/1.73m2], The estimated glomerular filtration rate (eGFR) calculation is based on  	the 2021 CKD-EPI creatinine equation, which is validated in male and  	female population 18 years of age and older (N Engl J Med 2021;  	[id number]).    29-Nov-2024 12:52, Pro-Brain Natriuretic Peptide  Pro-Brain Natriuretic Peptide:   312, [0 - 300 pg/mL]    29-Nov-2024 12:52, Troponin T, High Sensitivity  Troponin T, High Sensitivity Result: 11, [0 - 51 ng/L], *  	*  	Rapid upward or downward changes in high-sensitivity troponin levels  	suggest acute myocardial injury. Renal impairment may cause sustained  	troponin elevations.  	Normal: <6 - 14 ng/L  	Indeterminate: 15-51 ng/L  	Elevated: > 51 ng/L  	See http://labs/test/[email or web address] on the [employer] for more  	information    29-Nov-2024 14:10, Troponin T, High Sensitivity  Troponin T, High Sensitivity Result: 12, [0 - 51 ng/L], *  	*  	Rapid upward or downward changes in high-sensitivity troponin levels  	suggest acute myocardial injury. Renal impairment may cause sustained  	troponin elevations.  	Normal: <6 - 14 ng/L  	Indeterminate: 15-51 ng/L  	Elevated: > 51 ng/L  	See http://labs/test/[email or web address] on the [facility] intranet for more  	information  Coagulation:    29-Nov-2024 12:52, Activated Partial Thromboplastin Time  Activated Partial Thromboplastin Time: 31.1, [24.5 - 35.6 sec], The recommended therapeutic heparin range (full dose) is 58-99 seconds.  	Argatroban range is 1.5 to 3.0 times of the baseline APTT value, not to  	exceed 100 seconds.  	Routine coagulation results should be interpreted with caution when  	taking Factor Xa inhibitors or direct thrombin inhibitors; blood sampling  	prior to drug intake is recommended.    29-Nov-2024 12:52, Prothrombin Time and INR, Plasma  Prothrombin Time, Plasma:   14.5, [9.9 - 13.4 sec]  INR:   1.25, [0.85 - 1.16 ratio], Recommended targets/ranges for therapeutic INR:  	2.0-3.0 Deep vein thrombosis, pulmonary embolism, atrial fibrillation  	2.0-3.0 Mechanical aortic valve, antiphospholipid syndrome with previous  	arterial or venous thromboembolism  	2.5-3.5 Mechanical mitral valve, double mechanical valve (aortic and  	mitral positions, high risk valves)  	Note: Chest 2012 Feb;141(2 Suppl):7S-47S  	Routine coagulation results should be interpreted with caution when  	taking Factor Xa inhibitors or direct thrombin inhibitors; blood sampling  	prior to drug intake is recommended.  Hematology:    29-Nov-2024 12:52, Complete Blood Count + Automated Diff  WBC Count:   3.36, [3.80 - 10.50 K/uL]  RBC Count:   3.50, [4.20 - 5.80 M/uL]  Hemoglobin:   10.2, [13.0 - 17.0 g/dL]  Hematocrit:   30.9, [39.0 - 50.0 %]  Mean Cell Volume: 88.3, [80.0 - 100.0 fl]  Mean Cell Hemoglobin: 29.1, [27.0 - 34.0 pg]  Mean Cell Hemoglobin Conc: 33.0, [32.0 - 36.0 g/dL]  Red Cell Distrib Width: 13.7, [10.3 - 14.5 %]  Platelet Count - Automated:   415, [150 - 400 K/uL]  Auto Neutrophil #: 2.64, [1.80 - 7.40 K/uL]  Auto Lymphocyte #:   0.21, [1.00 - 3.30 K/uL]  Auto Monocyte #: 0.24, [0.00 - 0.90 K/uL]  Auto Eosinophil #: 0.00, [0.00 - 0.50 K/uL]  Auto Basophil #: 0.00, [0.00 - 0.20 K/uL]  Auto Neutrophil %:   78.6, [43.0 - 77.0 %], Differential percentages must be correlated with absolute numbers for  	clinical significance.  Auto Lymphocyte %:   6.3, [13.0 - 44.0 %]  Auto Monocyte %: 7.1, [2.0 - 14.0 %]  Auto Eosinophil %: 0.0, [0.0 - 6.0 %]  Auto Basophil %: 0.0, [0.0 - 2.0 %]    29-Nov-2024 12:52, Manual Differential  Platelet Morphology: Normal, [Normal]  Manual Smear Verification: Performed  Reactive Lymphocytes %:   8.0, [0.0 - 6.0 %]  Red Cell Morphology:   Abnormal, [Normal]  Hypochromia: Slight  Polychromasia: Slight  Poikilocytosis: Slight  Elliptocytes: Slight  Ovalocytes: Slight  Smudge Cells: Present  Giant Platelets: Present      RADIOLOGY:    ACC: [id number] [facility] HILLARY GARCIA    PROCEDURE DATE: 11/29/2024        INTERPRETATION: CTA CHEST    INDICATION: Lung cancer. Tachycardia. Shortness of breath.    TECHNIQUE: Enhanced helical images were obtained of the chest. Coronal and sagittal images were reconstructed Maximum intensity projection images were generated.    Omnipaque 350 70 cc administered 30 cc discarded    COMPARISON: Radiograph chest 11/29/2024..    FINDINGS:    Pulmonary Artery: There is no main or lobar segmental pulmonary embolus. The segmental and subsegmental divisions are not well evaluated secondary to timing of the bolus and compression of the arteries by the enlarged lymph nodes.    Tubes/Lines: None.    Lungs, airways and pleura: There are upper paratracheal, lower paratracheal and prevascular, AP window, subcarinal, and bilateral confluent hilar lymph nodes. There is narrowing of the trachea, bilateral mainstem bronchi and lobar bronchi.    The lymph nodes largest confluent segment measures 10.0 x 10.4 cm (series 8 image 76).    Small left pleural effusion. Moderate-sized right pleural effusion.    Emphysema. There are bilateral pulmonary nodules/opacities. Within the right upper lobe are perihilar lobulated opacities which extend centrally to narrow the right upper lobe bronchus. In addition, there are groundglass opacities and consolidation along with septal thickening in the left upper lobe, lingula, and left lower lobe.    The largest nodules/opacity is measure:  * In the right upper lobe is a 1.7 x 1.6 cm nodule (series 8 image 39).  * In the left upper lobe is a 7.2 x 4.5 cm ill-defined opacity (series 8 image 111).    No pneumothorax    Mediastinum: The enlarged confluent mediastinal lymph nodes are reported in the lungs section of the report.    The SVC is encased and severely narrowed secondary to the confluent lymph nodes along its most proximal segment.    Left-sided arch and left-sided descending thoracic aorta. The aorta is normal in caliber. Aortic calcifications. The inflow the SVC into the right atrium is patent.    The heart is normal in size. Coronary artery calcifications. The pulmonary veins are narrowed by the confluent lymph nodes. Small amount of pericardial fluid.    Upper Abdomen: The imaged upper abdomen is unremarkable.    Bones And Soft Tissues: The bones are unremarkable. The soft tissues are unremarkable.      IMPRESSION:    1. No pulmonary embolus, however, the segmental and subsegmental divisions are not well evaluated.  2. The enlarged chest lymph nodes are confluent with perihilar opacities, largest of which is in the left upper lobe that measures 7.2 x 4.5 cm. These findings are concerning for the reported primary lung malignancy.  3. The enlarged chest lymph nodes severely narrow the SVC.  4. The enlarged lymph nodes and perihilar opacities narrowing of the trachea and lobar bronchi.  5. Bilateral pleural effusions (right greater than left).  6. The findings were discussed with read back verification obtained from Dr. Reynoso on 11/29/2024 at 1532 hours.    --- End of Report ---      GINA MATTHEWS MD; Attending Radiologist  This document has been electronically signed. Nov 29 2024 3:33PM      EXAM: [id number] - PETCT WB ONC FDG SUBS - ORDERED BY: LAUREN RAMIREZ      PROCEDURE DATE: 10/24/2024        INTERPRETATION: CLINICAL INFORMATION: Small cell lung cancer. Bone scan shows skull lesions, further evaluation. Monitor treatment response to chemotherapy, last dose June 2024..    TREATMENT STRATEGY EVALUATION: Subsequent  AREA IMAGED: Whole body ([id number])  FASTING BLOOD SUGAR: 111 mg/dl  RADIOPHARMACEUTICAL: 13.5 mCi F-18 FDG, I.V.  I.V. SITE: antecubital right  UPTAKE PERIOD: 56 min  SCANNER: Sova [id number]  ORAL CONTRAST: Omnipaque 300  PHARMACOLOGIC INTERVENTION: None.    TECHNIQUE: Following intravenous injection of above radiopharmaceutical and uptake period, PET/CT was performed. CT protocol was optimized for PET attenuation correction and anatomic localization and was not designed to produce and cannot replace state-of-the-art diagnostic CT images with specific imaging protocols for different body parts and indications. Images were reconstructed and reviewed in axial, coronal and sagittal views and three-dimensional MIP.    The standardized uptake values (SUV) are normalized to patient body weight and indicate the highest activity concentration (SUVmax) in a given site. All image numbers refer to axial image number.    COMPARISON: None.    OTHER STUDIES USED FOR CORRELATION: CT chest abdomen and pelvis 8/2/2024. Bone scan 8/2/2024. CT chest 4/15/2024.    FINDINGS:    HEAD/NECK: Physiologic FDG activity in visualized brain. Asymmetrical uptake in the vocal cords, right more than left suggesting left vocal cord paralysis.    THORAX: Compared to CT 8/2/2024, there has been interval development of multiple enlarged and FDG avid lymph nodes in the bilateral sreekanth and mediastinum, for example paratracheal, prevascular, pericardial, subcarinal, and paraesophageal. A large left hilar conglomerate measures approximately 4.9 x 4.5 cm, SUV 19.6 (image 140). A subcarinal conglomerate measures approximately 6.3 x 2.8 cm, SUV 14.2 (image 148).    LUNGS: Multiple bilateral FDG avid lung nodules, some of which are new, for example a left lower lobe nodule measuring 1.7 x 1.5 cm, SUV 4.4 (image 158); previously not present. An FDG avid right upper lobe nodule measures 1.6 x 1.0 cm, SUV 5.8 (image 123); previously 1.1 x 0.7 cm on CT 8/2/2024.    PLEURA/PERICARDIUM: Small pericardial effusion. Multiple nodules abutting or in the pericardium, including a nodule adjacent to the left main coronary artery. No pleural effusion. FDG avid left pleural-based nodule measuring 0.6 cm, SUV 4.3 (image 122)    HEPATOBILIARY/PANCREAS: Physiologic FDG activity. For reference, normal liver demonstrates SUV mean 2.3.    SPLEEN: Physiologic FDG activity. Normal in size.    ADRENAL GLANDS: No abnormal FDG activity. No nodule.    KIDNEYS/URINARY BLADDER: Physiologic excreted FDG activity.    REPRODUCTIVE ORGANS: No abnormal FDG activity.    ABDOMINOPELVIC LYMPH NODES/RETROPERITONEUM: No enlarged or FDG-avid lymph node.    ESOPHAGUS/STOMACH/BOWEL/PERITONEUM/MESENTERY: No abnormal FDG activity. Favor physiological bowel activity.    VESSELS: Coronary and large vessel calcifications. IVC filter.    BONES/SOFT TISSUES: A few FDG avid bone metastases are evident, for example in the right lateral seventh rib, the posterior left iliac, and the proximal right femur, not evident on previous bone scan. The proximal right femur lesion shows mild sclerosis on CT, SUV 15.7 (image 303). No FDG avid skull lesions are evident.  No FDG avid cutaneous lesions.    IMPRESSION:    1. Compared to CT 8/2/2024, there has been interval progression of FDG avid disease.    2. Multiple enlarged and FDG avid lymph nodes in the mediastinum and bilateral sreekanth, as above.    3. Multiple bilateral FDG avid lung nodules.    4. Multiple FDG avid nodules abutting or in the pericardium including a nodule adjacent to the left main coronary artery.    5. A few FDG avid bone metastases are evident.    6. Likely left vocal cord paralysis.    --- End of Report ---    KURTIS NELSON MD; Attending Radiologist  This document has been electronically signed. Oct 25 2024 10:07AM
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
## 2024-12-02 NOTE — PROGRESS NOTE ADULT - ASSESSMENT
55 y/o male with small cell lung cancer diagnosed in March 2024 ( [facility]). [place] to have extensive stage at diagnosis.  S/p  chemoimmunotherapy with etoposide, carboplatin and atezolizumab x 4 cycles - completed in June 2024. Refused maintainance Atezolizumab   October 2024 - progression of disease.  PET 10/24/24 : worsening mediastinal and hilar adenopathy, multiple bilat lung mets, pericardial mets and bone mets. Adenopathy  narrows SVC and also narrowing  trachea and lobar bronchi.     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  Started second line chemotherapy Lurbinectedin ( Zepzelca) on 11/15/24 ( given every 21 days- next due Dec 6) Oncologist Dr Rasheed at  [facility].     11/29/24:  CT CHEST:   - No central pulmonary embolus, segmental and subsegmental  divisions are not well evaluated.  -  The enlarged chest lymph nodes are confluent with perihilar  opacities, largest of which is in the left upper lobe that measures 7.2 x   4.5 cm. These findings are concerning for the reported primary lung  malignancy.  -   The enlarged chest lymph nodes severely narrow the SVC.  -  The enlarged lymph nodes and perihilar opacities narrowing of the  trachea and lobar bronchi.  -   Bilateral pleural effusions (right greater than left).    #Extensive stage Small cell Lung carcinoma   # On Lurbinectin  # SOB     S/p R chest tube for likely  malignant pleural effusion.  Hemoptysis due to disease ( had before recent chemotherapy)  - radiation oncology to evaluate for palliative RT to central lung/ amberly mass to relieve bronchial / tracheal obstruction  compression of SVC.  - S/p Thoracic surgery RT PTC placed 11/30 with initial output 250cc serous fluid  - cefepime, azithromycin to cover obstructive pna. dc vanco neg mrsa  - Palliative care consultation re symptom management and GOC.      INPROGRES   53 y/o male with small cell lung cancer diagnosed in March 2024 ( [facility]). [place] to have extensive stage at diagnosis.  S/p  chemoimmunotherapy with etoposide, carboplatin and atezolizumab x 4 cycles - completed in June 2024. Refused maintainance Atezolizumab   October 2024 - progression of disease.  PET 10/24/24 : worsening mediastinal and hilar adenopathy, multiple bilat lung mets, pericardial mets and bone mets. Adenopathy  narrows SVC and also narrowing  trachea and lobar bronchi.     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  Started second line chemotherapy Lurbinectedin ( Zepzelca) on 11/15/24 ( given every 21 days- next due Dec 6) Oncologist Dr Rasheed at  [facility].     11/29/24:  CT CHEST:   - No central pulmonary embolus, segmental and subsegmental  divisions are not well evaluated.  -  The enlarged chest lymph nodes are confluent with perihilar  opacities, largest of which is in the left upper lobe that measures 7.2 x   4.5 cm. These findings are concerning for the reported primary lung  malignancy.  -   The enlarged chest lymph nodes severely narrow the SVC.  -  The enlarged lymph nodes and perihilar opacities narrowing of the  trachea and lobar bronchi.  -   Bilateral pleural effusions (right greater than left).    #Extensive stage Small cell Lung carcinoma   # On Lurbinectin  # SOB     S/p R chest tube for likely  malignant pleural effusion.  Hemoptysis due to disease ( had before recent chemotherapy)  - radiation oncology to evaluate for palliative RT to central lung/ amberly mass to relieve bronchial / tracheal obstruction  compression of SVC.  - S/p Thoracic surgery RT PTC placed 11/30 with initial output 250cc serous fluid  - cefepime, azithromycin to cover obstructive pna. dc vanco neg mrsa  - Palliative care on board  - discussion held with wife and sons at bedside on the aggressive nature of the disease.   Initially wife / patient discussed comfort are with palliative team and hospitalist however later stated that they were still considering. Discussed overall poor guarded prognosis. Rad onc to evaluate for palliative RT however transport will likely be an issue

## 2024-12-02 NOTE — GOALS OF CARE CONVERSATION - ADVANCED CARE PLANNING - CONVERSATION DETAILS
Called by RN, patient tachypneic, anxious, wife at bedside. States he does not want to suffer. Requesting he received medications to help him feel mre comfortable. Understands that medications to treat symptoms may also shorten his life. DEL discussed. Patient signed DNR/DNI, requesting symptom management. Wife at bedside verbalized understanding of patient's wishes. Palliative Care at bedside.

## 2024-12-02 NOTE — PROGRESS NOTE ADULT - TIME BILLING
greater than 50% of time spent reviewing labs, notes, orders and radiographs, coordinating care  discussed with nursing, primary team,consultants

## 2024-12-02 NOTE — PROGRESS NOTE ADULT - ASSESSMENT
54M with PMH of Chronic Hypoxic Respiratory Failure on 4 l nc and Metastatic SCLC recently initiated cycle of Zepzelca at [facility] 2 weeks ago presenting to ER with tachycardia, SOB and hemoptysis since initiation of chemo. In ER CTA demonstrated Significant diffuse LAD, severe SVC Compression, SERG consolidation/Mass, Bilateral pleural effusions. Requiring oxygen via NC 6LPM. Also noted to be sinus tachycardic to 120's-130's with non ischemic ekg and negative serial troponins. afib rvr @190 am s/p iv lopressor. now NST.    New Pafib RVR  -NSR now  -s/p iv lopressor.12/01 converted to nsr  -start bb with hold parameter  --Echo ef >75%,RV enlargement with reduce systolic function.  -dw cardiology No ac rec now,given hemoptysis,advance Lung ca  -tte on 11/29 reviewd.     Acute on Chronic Respiratory Failure mulifactorial in setting of progression of metastatic SCLC, bilateral pleural effusions R>L,   -s/p rt chest tube  -f/u ct surgery rec  -oxygen supplement on 7 l   -abg reviewed  -ct surgery for thoracentesis  - cefepime, azithromycin to cover obstructive pna. dc vanco neg mrsa  -f/u final  sputum culture  - urine strep  -urine legionella  -MRSA PCR neg  -viral RVP negative  -f/u final blood culture  -f/u ct surgery rec. pulm/id following    SCLC  -c/s radiation /onc eval  - progressive disease per pt/wife at bedside. Pt was initially started on chemo march/april 2024 but no improvement which was stoppled but started chemo again 2wks ago for progression of disease.  -hem/onc is following  -poor prognosis      Superior vena cava stenosis.   - Mechanical due to nodes  -no immediate cardiac intervention needed  -seen by Vascular team    leukopenia but not neutropenic 2/2 infection vs chemotoxicity  -continue to trend  -treatment as above    Acute blood loss anemia  -Normocytic  -possibly due to hemoptysis as noted at bedside and/or 2/2 chemotherapy  -continue to trend  -transfuse for hemoglobin <7  -Pulmonary consult.      Constipation  -miralax   -Senna    Poor appetite  dysphagia  -nutrition consult  - Marinol  -speech eval  -pt was on Marinol oil which was helping him per wife/pt      DVT prophylaxis  -SCD  -no anticoagulants due to acute blood loss anemia.     Dispo: acute,pending improvement of respiratory status.  Long dw pt and wife at bedside. Understood Pt is high risk of intubation. over all prognosis poor       54M with PMH of Chronic Hypoxic Respiratory Failure on 4 l nc and Metastatic SCLC recently initiated cycle of Zepzelca at [facility] 2 weeks ago presenting to ER with tachycardia, SOB and hemoptysis since initiation of chemo. In ER CTA demonstrated Significant diffuse LAD, severe SVC Compression, SERG consolidation/Mass, Bilateral pleural effusions. Requiring oxygen via NC 6LPM. Also noted to be sinus tachycardic to 120's-130's with non ischemic ekg and negative serial troponins. afib rvr @190 am s/p iv lopressor. now NST.    New Pafib RVR  -NSR now  -s/p iv lopressor.12/01 converted to nsr  -start bb with hold parameter  --Echo ef >75%,RV enlargement with reduce systolic function.  -dw cardiology No ac rec now,given hemoptysis,advance Lung ca  -tte on 11/29 reviewd.     Acute on Chronic Respiratory Failure mulifactorial in setting of progression of metastatic SCLC, bilateral pleural effusions R>L,   -s/p rt chest tube  -f/u ct surgery rec  -oxygen supplement on 7 l   -abg reviewed  -ct surgery for thoracentesis  - cefepime, azithromycin to cover obstructive pna. dc vanco neg mrsa  -f/u final  sputum culture  - urine strep  -urine legionella  -MRSA PCR neg  -viral RVP negative  -f/u final blood culture  -f/u ct surgery rec. pulm/id following    SCLC  -c/s radiation /onc eval  - progressive disease per pt/wife at bedside. Pt was initially started on chemo march/april 2024 but no improvement which was stoppled but started chemo again 2wks ago for progression of disease.  -hem/onc is following  -poor prognosis      Superior vena cava stenosis.   - Mechanical due to nodes  -no immediate cardiac intervention needed  -seen by Vascular team    leukopenia but not neutropenic 2/2 infection vs chemotoxicity  -continue to trend  -treatment as above    Acute blood loss anemia  -Normocytic  -possibly due to hemoptysis as noted at bedside and/or 2/2 chemotherapy  -continue to trend  -transfuse for hemoglobin <7  -Pulmonary consult.      Constipation  -miralax   -Senna    Poor appetite  dysphagia  -nutrition consult  - Marinol  -speech eval  -pt was on Marinol oil which was helping him per wife/pt    LE edema  -venous doppler LE      DVT prophylaxis  -SCD  -no anticoagulants due to acute blood loss anemia.     Dispo: acute,pending improvement of respiratory status.  Long dw pt and wife at bedside. Understood Pt is high risk of intubation. over all prognosis poor  Recalled Radition/onc service-Dr Matt will see him today.  RAISA Weaver,rec radiation/onc eval for palliative radition. over all prognosis poor.She will talk to his primary oncology.  c/s palliative team       54M with PMH of Chronic Hypoxic Respiratory Failure on 4 l nc and Metastatic SCLC recently initiated cycle of Zepzelca at [facility] 2 weeks ago presenting to ER with tachycardia, SOB and hemoptysis since initiation of chemo. In ER CTA demonstrated Significant diffuse LAD, severe SVC Compression, SERG consolidation/Mass, Bilateral pleural effusions. Requiring oxygen via NC 6LPM. Also noted to be sinus tachycardic to 120's-130's with non ischemic ekg and negative serial troponins. afib rvr @190 am s/p iv lopressor. now NST.    New Pafib RVR  -NSR now  -s/p iv lopressor.12/01 converted to nsr  -start bb with hold parameter  --Echo ef >75%,RV enlargement with reduce systolic function.  -dw cardiology No ac rec now,given hemoptysis,advance Lung ca  -tte on 11/29 reviewd.     Acute on Chronic Respiratory Failure mulifactorial in setting of progression of metastatic SCLC, bilateral pleural effusions R>L,   -s/p rt chest tube  -f/u ct surgery rec  -oxygen supplement on 7 l   -abg reviewed  -ct surgery for thoracentesis  - cefepime, azithromycin to cover obstructive pna. dc vanco neg mrsa  -f/u final  sputum culture  - urine strep  -urine legionella  -MRSA PCR neg  -viral RVP negative  -f/u final blood culture  -f/u ct surgery rec. pulm/id following    SCLC  -c/s radiation /onc eval  - progressive disease per pt/wife at bedside. Pt was initially started on chemo march/april 2024 but no improvement which was stoppled but started chemo again 2wks ago for progression of disease.  -hem/onc is following  -poor prognosis      Superior vena cava stenosis.   - Mechanical due to nodes  -no immediate cardiac intervention needed  -seen by Vascular team    leukopenia but not neutropenic 2/2 infection vs chemotoxicity  -continue to trend  -treatment as above    Acute blood loss anemia  -Normocytic  -possibly due to hemoptysis as noted at bedside and/or 2/2 chemotherapy  -continue to trend  -transfuse for hemoglobin <7  -Pulmonary consult.      Constipation  -miralax   -Senna    Poor appetite  dysphagia  -nutrition consult  - Marinol  -speech eval  -pt was on Marinol oil which was helping him per wife/pt    LE edema  -venous doppler LE      DVT prophylaxis  -SCD  -no anticoagulants due to acute blood loss anemia.     Dispo: acute,pending improvement of respiratory status.  Long dw pt and wife at bedside. Understood Pt is high risk of intubation. over all prognosis poor  Recalled Radition/onc service-Dr Matt will see him today.  RAISA Weaver,rec radiation/onc eval for palliative radiation. over all prognosis poor.She will talk to his primary oncology.  c/s palliative team    update: raisa pt at bedside. pt wants to be comfortable. DNR/DNI. no labs, no escalation of care. dc telemetry. c/s hospice. seen by palliative team.comfort care only. will titrate morphine drip,ativan prn.  wife is waiting for  to come to sign some paper by .     54M with PMH of Chronic Hypoxic Respiratory Failure on 4 l nc and Metastatic SCLC recently initiated cycle of Zepzelca at [facility] 2 weeks ago presenting to ER with tachycardia, SOB and hemoptysis since initiation of chemo. In ER CTA demonstrated Significant diffuse LAD, severe SVC Compression, SERG consolidation/Mass, Bilateral pleural effusions. Requiring oxygen via NC 6LPM. Also noted to be sinus tachycardic to 120's-130's with non ischemic ekg and negative serial troponins. afib rvr @190 am s/p iv lopressor. now NST.    New Pafib RVR  -NSR now  -s/p iv lopressor.12/01 converted to nsr  -start bb with hold parameter  --Echo ef >75%,RV enlargement with reduce systolic function.  -dw cardiology No ac rec now,given hemoptysis,advance Lung ca  -tte on 11/29 reviewd.     Acute on Chronic Respiratory Failure mulifactorial in setting of progression of metastatic SCLC, bilateral pleural effusions R>L,   -s/p rt chest tube  -f/u ct surgery rec  -oxygen supplement on 7 l   -abg reviewed  -ct surgery for thoracentesis  - cefepime, azithromycin to cover obstructive pna. dc vanco neg mrsa  -f/u final  sputum culture  - urine strep  -urine legionella  -MRSA PCR neg  -viral RVP negative  -f/u final blood culture  -f/u ct surgery rec. pulm/id following    SCLC  -c/s radiation /onc eval  - progressive disease per pt/wife at bedside. Pt was initially started on chemo march/april 2024 but no improvement which was stoppled but started chemo again 2wks ago for progression of disease.  -hem/onc is following  -poor prognosis      Superior vena cava stenosis.   - Mechanical due to nodes  -no immediate cardiac intervention needed  -seen by Vascular team    leukopenia but not neutropenic 2/2 infection vs chemotoxicity  -continue to trend  -treatment as above    Acute blood loss anemia  -Normocytic  -possibly due to hemoptysis as noted at bedside and/or 2/2 chemotherapy  -continue to trend  -transfuse for hemoglobin <7  -Pulmonary consult.      Constipation  -miralax   -Senna    Poor appetite  dysphagia  -nutrition consult  - Marinol  -speech eval  -pt was on Marinol oil which was helping him per wife/pt    LE edema  -venous doppler LE      DVT prophylaxis  -SCD  -no anticoagulants due to acute blood loss anemia.     Dispo: acute,pending improvement of respiratory status.  Long dw pt and wife at bedside. Understood Pt is high risk of intubation. over all prognosis poor  Recalled Radition/onc service-Dr Matt will see him today.  RAISA Weaver,rec radiation/onc eval for palliative radiation. over all prognosis poor.She will talk to his primary oncology.  c/s palliative team    update: raisa pt at bedside. pt wants to be comfortable. DNR/DNI. no labs, no escalation of care. dc telemetry. c/s hospice. seen by palliative team.comfort care only. will titrate morphine drip,ativan prn.  wife is waiting for  to come to sign some paper by .  Case RAISA Delacruz - poor prognosis.Rec to [facility] Radiation/onc eval for palliative radiation   Case RAISA Echols Radiation onc-- rec palliative care.

## 2024-12-02 NOTE — PROGRESS NOTE ADULT - SUBJECTIVE AND OBJECTIVE BOX
Patient is a 54y old  Male who presents with a chief complaint of SOB (01 Dec 2024 14:50)    c/o sob on mild exertion, on 7l nc. no improvement of breathing, uncomfortable.poor po intake  denies cp,fever,chill       REVIEW OF SYSTEMS: All systems are reviewed and found to be negative except above    MEDICATIONS  (STANDING):  ALPRAZolam 1 milliGRAM(s) Oral every 8 hours  cefepime  Injectable.      cefepime  Injectable. 2000 milliGRAM(s) IV Push every 8 hours  dronabinol 2.5 milliGRAM(s) Oral daily  metoprolol tartrate Injectable 2.5 milliGRAM(s) IV Push every 6 hours  morphine  Infusion 2 mG/Hr (2 mL/Hr) IV Continuous <Continuous>  scopolamine 1 mG/72 Hr(s) Patch 1 Patch Transdermal every 72 hours  sodium chloride 0.9%. 1000 milliLiter(s) (100 mL/Hr) IV Continuous <Continuous>    MEDICATIONS  (PRN):  acetaminophen     Tablet .. 650 milliGRAM(s) Oral every 6 hours PRN Temp greater or equal to 38C (100.4F), Mild Pain (1 - 3)  aluminum hydroxide/magnesium hydroxide/simethicone Suspension 30 milliLiter(s) Oral every 4 hours PRN Dyspepsia  glycopyrrolate Injectable 0.2 milliGRAM(s) IV Push every 4 hours PRN secretion management  levalbuterol Inhalation 1.25 milliGRAM(s) Inhalation every 8 hours PRN shortness of breath or wheezing  melatonin 3 milliGRAM(s) Oral at bedtime PRN Insomnia  morphine  - Injectable 2 milliGRAM(s) IV Push every 2 hours PRN persistent severe pain, TaCHYPNEA rr>20 MIN  ondansetron Injectable 4 milliGRAM(s) IV Push every 8 hours PRN Nausea and/or Vomiting      CAPILLARY BLOOD GLUCOSE      POCT Blood Glucose.: 112 mg/dL (01 Dec 2024 12:48)    I&O's Summary    01 Dec 2024 07:01  -  02 Dec 2024 07:00  --------------------------------------------------------  IN: 50 mL / OUT: 1550 mL / NET: -1500 mL        PHYSICAL EXAM:  Vital Signs Last 24 Hrs  T(C): 36.4 (02 Dec 2024 07:53), Max: 36.6 (01 Dec 2024 15:44)  T(F): 97.5 (02 Dec 2024 07:53), Max: 97.9 (01 Dec 2024 15:44)  HR: 136 (02 Dec 2024 10:00) (118 - 139)  BP: 140/89 (02 Dec 2024 10:00) (92/55 - 148/87)  BP(mean): 104 (02 Dec 2024 10:00) (67 - 104)  RR: 18 (02 Dec 2024 10:00) (16 - 24)  SpO2: 95% (02 Dec 2024 10:00) (91% - 100%)    Parameters below as of 02 Dec 2024 10:00  Patient On (Oxygen Delivery Method): nasal cannula  O2 Flow (L/min): 6      CONSTITUTIONAL:mild respiratory distress  EYES: PERRLA; conjunctiva and sclera clear  ENMT: Moist oral mucosa,   RESPIRATORY: positive air entry. crackles  to auscultation bilaterally  CARDIOVASCULAR: Regular rate and rhythm, normal S1 and S2, no murmur   EXTS: + lower extremity edema; Peripheral pulses are 2+ bilaterally  ABDOMEN: Nontender to palpation, normoactive bowel sounds, no rebound/guarding;   MUSCLOSKELETAL:  no joint swelling or tenderness to palpation  PSYCH: affect appropriate  NEUROLOGY: A+O to person, place, and time; CN 2-12 are intact and symmetric; no gross sensory deficits;       LABS:                        10.5   5.50  )-----------( 465      ( 02 Dec 2024 05:55 )             31.6     12-02    134[L]  |  91[L]  |  42.7[H]  ----------------------------<  135[H]  5.2   |  25.0  |  0.78    Ca    10.1      02 Dec 2024 05:55  Phos  3.6     12-01  Mg     1.9     12-01        CARDIAC MARKERS ( 01 Dec 2024 04:33 )  x     / x     / x     / x     / 3.2 ng/mL      Urinalysis Basic - ( 02 Dec 2024 05:55 )    Color: x / Appearance: x / SG: x / pH: x  Gluc: 135 mg/dL / Ketone: x  / Bili: x / Urobili: x   Blood: x / Protein: x / Nitrite: x   Leuk Esterase: x / RBC: x / WBC x   Sq Epi: x / Non Sq Epi: x / Bacteria: x        Culture - Sputum (collected 01 Dec 2024 08:00)  Source: .Sputum Sputum  Gram Stain (01 Dec 2024 22:46):    Rare polymorphonuclear leukocytes per low power field    Rare Squamous epithelial cells per low power field    Rare Gram Negative Rods per oil power field  Preliminary Report (02 Dec 2024 07:21):    Commensal emir consistent with body site    Culture - Fungal, Body Fluid (collected 30 Nov 2024 11:00)  Source: Pleural Fl  Preliminary Report (01 Dec 2024 09:50):    No growth    Culture - Body Fluid with Gram Stain (collected 30 Nov 2024 11:00)  Source: Pleural Fl  Gram Stain (30 Nov 2024 19:58):    No polymorphonuclear leukocytes per low power field    No organisms seen per oil power field  Preliminary Report (01 Dec 2024 08:49):    No growth    Culture - Blood (collected 29 Nov 2024 12:52)  Source: .Blood BLOOD  Preliminary Report (01 Dec 2024 19:01):    No growth at 48 Hours        RADIOLOGY & ADDITIONAL TESTS:  Results Reviewed:

## 2024-12-02 NOTE — CONSULT NOTE ADULT - NSCONSULTADDITIONALINFOA_GEN_ALL_CORE
Total Time Spent_90___ minutes  This includes chart review, patient assessment, discussion and collaboration with interdisciplinary team members, ACP planning    COUNSELING:  Face to face meeting to discuss Advanced Care Planning - Time Spent _10_____Minutes.      Thank you for the opportunity to assist with the care of this patient.   [facility] Palliative Medicine Consult Service [phone].
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
## 2024-12-02 NOTE — PROGRESS NOTE ADULT - SUBJECTIVE AND OBJECTIVE BOX
Patient is a 54y old  Male who presents with a chief complaint of SOB (01 Dec 2024 14:50)      SUBJECTIVE / OVERNIGHT EVENTS:    ADDITIONAL REVIEW OF SYSTEMS:    MEDICATIONS  (STANDING):  ascorbic acid 500 milliGRAM(s) Oral daily  azithromycin  IVPB 500 milliGRAM(s) IV Intermittent every 24 hours  cefepime  Injectable.      cefepime  Injectable. 2000 milliGRAM(s) IV Push every 8 hours  dronabinol 2.5 milliGRAM(s) Oral daily  metoprolol tartrate Injectable 2.5 milliGRAM(s) IV Push every 6 hours  multivitamin 1 Tablet(s) Oral daily  sodium chloride 0.9%. 1000 milliLiter(s) (100 mL/Hr) IV Continuous <Continuous>    MEDICATIONS  (PRN):  acetaminophen     Tablet .. 650 milliGRAM(s) Oral every 6 hours PRN Temp greater or equal to 38C (100.4F), Mild Pain (1 - 3)  ALPRAZolam 0.5 milliGRAM(s) Oral at bedtime PRN sleep  aluminum hydroxide/magnesium hydroxide/simethicone Suspension 30 milliLiter(s) Oral every 4 hours PRN Dyspepsia  levalbuterol Inhalation 1.25 milliGRAM(s) Inhalation every 8 hours PRN shortness of breath or wheezing  melatonin 3 milliGRAM(s) Oral at bedtime PRN Insomnia  morphine  - Injectable 1 milliGRAM(s) IV Push three times a day PRN Severe Pain (7 - 10)  ondansetron Injectable 4 milliGRAM(s) IV Push every 8 hours PRN Nausea and/or Vomiting  oxycodone    5 mG/acetaminophen 325 mG 2 Tablet(s) Oral every 6 hours PRN Severe Pain (7 - 10)  oxycodone    5 mG/acetaminophen 325 mG 1 Tablet(s) Oral every 6 hours PRN Moderate Pain (4 - 6)    CAPILLARY BLOOD GLUCOSE      POCT Blood Glucose.: 112 mg/dL (01 Dec 2024 12:48)    I&O's Summary    01 Dec 2024 07:01  -  02 Dec 2024 07:00  --------------------------------------------------------  IN: 50 mL / OUT: 1550 mL / NET: -1500 mL        PHYSICAL EXAM:  Vital Signs Last 24 Hrs  T(C): 36.4 (02 Dec 2024 07:53), Max: 36.6 (01 Dec 2024 15:44)  T(F): 97.5 (02 Dec 2024 07:53), Max: 97.9 (01 Dec 2024 15:44)  HR: 135 (02 Dec 2024 08:00) (116 - 139)  BP: 138/98 (02 Dec 2024 08:00) (92/55 - 148/87)  BP(mean): 103 (02 Dec 2024 08:00) (67 - 103)  RR: 18 (02 Dec 2024 08:00) (16 - 24)  SpO2: 93% (02 Dec 2024 08:00) (91% - 100%)    Parameters below as of 02 Dec 2024 08:00  Patient On (Oxygen Delivery Method): nasal cannula  O2 Flow (L/min): 7      LABS:                        10.5   5.50  )-----------( 465      ( 02 Dec 2024 05:55 )             31.6     12-02    134[L]  |  91[L]  |  42.7[H]  ----------------------------<  135[H]  5.2   |  25.0  |  0.78    Ca    10.1      02 Dec 2024 05:55  Phos  3.6     12-01  Mg     1.9     12-01        CARDIAC MARKERS ( 01 Dec 2024 04:33 )  x     / x     / x     / x     / 3.2 ng/mL      Urinalysis Basic - ( 02 Dec 2024 05:55 )    Color: x / Appearance: x / SG: x / pH: x  Gluc: 135 mg/dL / Ketone: x  / Bili: x / Urobili: x   Blood: x / Protein: x / Nitrite: x   Leuk Esterase: x / RBC: x / WBC x   Sq Epi: x / Non Sq Epi: x / Bacteria: x        Culture - Sputum (collected 01 Dec 2024 08:00)  Source: .Sputum Sputum  Gram Stain (01 Dec 2024 22:46):    Rare polymorphonuclear leukocytes per low power field    Rare Squamous epithelial cells per low power field    Rare Gram Negative Rods per oil power field  Preliminary Report (02 Dec 2024 07:21):    Commensal emir consistent with body site    Culture - Fungal, Body Fluid (collected 30 Nov 2024 11:00)  Source: Pleural Fl  Preliminary Report (01 Dec 2024 09:50):    No growth    Culture - Body Fluid with Gram Stain (collected 30 Nov 2024 11:00)  Source: Pleural Fl  Gram Stain (30 Nov 2024 19:58):    No polymorphonuclear leukocytes per low power field    No organisms seen per oil power field  Preliminary Report (01 Dec 2024 08:49):    No growth    Culture - Blood (collected 29 Nov 2024 12:52)  Source: .Blood BLOOD  Preliminary Report (01 Dec 2024 19:01):    No growth at 48 Hours      SARS-CoV-2: NotDetec (29 Nov 2024 12:52)      RADIOLOGY & ADDITIONAL TESTS:  Imaging from Last 24 Hours:    Electrocardiogram/QTc Interval:    COORDINATION OF CARE:  Care Discussed with Consultants/Other Providers:       Patient is a 54y old  Male who presents with a chief complaint of SOB (01 Dec 2024 14:50)      SUBJECTIVE / OVERNIGHT EVENTS:    ADDITIONAL REVIEW OF SYSTEMS:    MEDICATIONS  (STANDING):  ascorbic acid 500 milliGRAM(s) Oral daily  azithromycin  IVPB 500 milliGRAM(s) IV Intermittent every 24 hours  cefepime  Injectable.      cefepime  Injectable. 2000 milliGRAM(s) IV Push every 8 hours  dronabinol 2.5 milliGRAM(s) Oral daily  metoprolol tartrate Injectable 2.5 milliGRAM(s) IV Push every 6 hours  multivitamin 1 Tablet(s) Oral daily  sodium chloride 0.9%. 1000 milliLiter(s) (100 mL/Hr) IV Continuous <Continuous>    MEDICATIONS  (PRN):  acetaminophen     Tablet .. 650 milliGRAM(s) Oral every 6 hours PRN Temp greater or equal to 38C (100.4F), Mild Pain (1 - 3)  ALPRAZolam 0.5 milliGRAM(s) Oral at bedtime PRN sleep  aluminum hydroxide/magnesium hydroxide/simethicone Suspension 30 milliLiter(s) Oral every 4 hours PRN Dyspepsia  levalbuterol Inhalation 1.25 milliGRAM(s) Inhalation every 8 hours PRN shortness of breath or wheezing  melatonin 3 milliGRAM(s) Oral at bedtime PRN Insomnia  morphine  - Injectable 1 milliGRAM(s) IV Push three times a day PRN Severe Pain (7 - 10)  ondansetron Injectable 4 milliGRAM(s) IV Push every 8 hours PRN Nausea and/or Vomiting  oxycodone    5 mG/acetaminophen 325 mG 2 Tablet(s) Oral every 6 hours PRN Severe Pain (7 - 10)  oxycodone    5 mG/acetaminophen 325 mG 1 Tablet(s) Oral every 6 hours PRN Moderate Pain (4 - 6)    CAPILLARY BLOOD GLUCOSE      POCT Blood Glucose.: 112 mg/dL (01 Dec 2024 12:48)    I&O's Summary    01 Dec 2024 07:01  -  02 Dec 2024 07:00  --------------------------------------------------------  IN: 50 mL / OUT: 1550 mL / NET: -1500 mL        PHYSICAL EXAM:  Vital Signs Last 24 Hrs  T(C): 36.4 (02 Dec 2024 07:53), Max: 36.6 (01 Dec 2024 15:44)  T(F): 97.5 (02 Dec 2024 07:53), Max: 97.9 (01 Dec 2024 15:44)  HR: 135 (02 Dec 2024 08:00) (116 - 139)  BP: 138/98 (02 Dec 2024 08:00) (92/55 - 148/87)  BP(mean): 103 (02 Dec 2024 08:00) (67 - 103)  RR: 18 (02 Dec 2024 08:00) (16 - 24)  SpO2: 93% (02 Dec 2024 08:00) (91% - 100%)    IN chair   CVS S1S2   RS + breath sounds corase   Abd; Soft  non tended   Ext; No pedal edema    Parameters below as of 02 Dec 2024 08:00  Patient On (Oxygen Delivery Method): nasal cannula  O2 Flow (L/min): 7      LABS:                        10.5   5.50  )-----------( 465      ( 02 Dec 2024 05:55 )             31.6     12-02    134[L]  |  91[L]  |  42.7[H]  ----------------------------<  135[H]  5.2   |  25.0  |  0.78    Ca    10.1      02 Dec 2024 05:55  Phos  3.6     12-01  Mg     1.9     12-01        CARDIAC MARKERS ( 01 Dec 2024 04:33 )  x     / x     / x     / x     / 3.2 ng/mL      Urinalysis Basic - ( 02 Dec 2024 05:55 )    Color: x / Appearance: x / SG: x / pH: x  Gluc: 135 mg/dL / Ketone: x  / Bili: x / Urobili: x   Blood: x / Protein: x / Nitrite: x   Leuk Esterase: x / RBC: x / WBC x   Sq Epi: x / Non Sq Epi: x / Bacteria: x        Culture - Sputum (collected 01 Dec 2024 08:00)  Source: .Sputum Sputum  Gram Stain (01 Dec 2024 22:46):    Rare polymorphonuclear leukocytes per low power field    Rare Squamous epithelial cells per low power field    Rare Gram Negative Rods per oil power field  Preliminary Report (02 Dec 2024 07:21):    Commensal emir consistent with body site    Culture - Fungal, Body Fluid (collected 30 Nov 2024 11:00)  Source: Pleural Fl  Preliminary Report (01 Dec 2024 09:50):    No growth    Culture - Body Fluid with Gram Stain (collected 30 Nov 2024 11:00)  Source: Pleural Fl  Gram Stain (30 Nov 2024 19:58):    No polymorphonuclear leukocytes per low power field    No organisms seen per oil power field  Preliminary Report (01 Dec 2024 08:49):    No growth    Culture - Blood (collected 29 Nov 2024 12:52)  Source: .Blood BLOOD  Preliminary Report (01 Dec 2024 19:01):    No growth at 48 Hours      SARS-CoV-2: NotDetec (29 Nov 2024 12:52)      RADIOLOGY & ADDITIONAL TESTS:  Imaging from Last 24 Hours:    Electrocardiogram/QTc Interval:    COORDINATION OF CARE:  Care Discussed with Consultants/Other Providers:

## 2024-12-02 NOTE — PROGRESS NOTE ADULT - ASSESSMENT
54M PMH SCLC (02/2024) via biopsy of L neck mass on chemo, former heavy smoker (2ppd x 40 years) , anemia who presented 11/29/24 with respiratory failure, SOB, found with severe SVC compression, SERG consolidation, B/L pleural effusions, s/p R PTC 11/30/24    Impression:  - Severe SVC compression, impending SVC syndrome  - Likely post-obstructive PNA  - Heavy former smoker, ?COPD  - Metastatic SCLC on chemo  - B/L pleural effusions - s/p R PTC 11/30/24  - SOB, increased work of breathing    Recommendations:  - case discussed with vascular, at this time stent for the SVC is unlikely be beneficial to the patient given the extensive cancer  - Heme/onc following,   - per primary discussion with patient and his family, likely comfort measure   - S/p R PTC placement 11/30/24; thoracic following   - DuoNebs - continue  - C/w broad spectrum ABx; f/u BCx  - Pt was trialed on HFNC but did not tolerate; would have available at bedside if possible  - If WOB worsens would consider BPAP, patient adamantly refused bipap   -agree with steroid and nebs for now  - palliative consult  - overall prognosis is poor  - pulmonary will sing off for now, please call for any questions     discussed with primary team

## 2024-12-02 NOTE — CONSULT NOTE ADULT - CONSULT REQUESTED BY NAME
Dr Andrade
Dr Andrade
Deedee Linn MD
Emmanuelle Everett NP Hospitalist
Dr. Andrade
Dr. Esposito
Dr Braswell

## 2024-12-02 NOTE — CONSULT NOTE ADULT - CONSULT REQUESTED DATE/TIME
30-Nov-2024 09:44
29-Nov-2024 18:08
30-Nov-2024 14:41
30-Nov-2024 14:24
02-Dec-2024 21:21
29-Nov-2024
02-Dec-2024 12:11

## 2024-12-02 NOTE — PROGRESS NOTE ADULT - PROBLEM SELECTOR PLAN 1
Concern for LEFT pleural effusion in setting of lung CA.   POCUS performed b/l - no window seen for fluid drainage on either side.  CT chest with consolidation/PNA on LEFT side.  Evidence of small/mod sized pleural effusion on RIGHT side on CT chest - though no fluid pocket seen on POCUS exam done prior to CT scan.  Thoracic will follow. Will consider repeating POCUS tomorrow if no clinical improvement.  Recommend trial of lasix in meantime  Trend CXR  Thoracic will follow.   D/w Dr. Braswell.
Consulted for for LEFT pleural effusion in setting of lung CA.   CT chest with consolidation/PNA on LEFT side.  Evidence of small/mod sized pleural effusion on RIGHT side on CT chest  POCUS  11/30, small-moderate sized fluid pocket seen very inferiorly on right  Patient's current symptoms more likely explained by SVC syndrome and PNA, but given worsening SOB, proceeded with chest tube placement.   RT PTC placed 11/30 with initial output 250cc serous fluid  Maintain CT to WS, Record drainage Q12H, daily CXR while still in place   f/u cytology, culture   Lights criteria: exudative  Thoracic will follow.  Recommend further diuresis  D/w Dr. Braswell.
Consulted for for LEFT pleural effusion in setting of lung CA.   CT chest with consolidation/PNA on LEFT side.  Evidence of small/mod sized pleural effusion on RIGHT side on CT chest - though no fluid pocket seen on initial POCUS exam done prior to CT scan.  POCUS repeated 11/30, small-moderate sized fluid pocket seen very inferiorly on right  Patient's current symptoms more likely explained by SVC syndrome and PNA, but given worsening SOB, proceeded with chest tube placement.   RT PTC placed with initial output 250cc serous fluid  Maintain CT to WS, Record drainage Q12H, daily CXR while still in place, f/u cytology, path, culture, ldh  Thoracic will follow.  Recommend further diuresis  D/w Dr. Braswell.
Consulted for for LEFT pleural effusion in setting of lung CA.   CT chest with consolidation/PNA on LEFT side.  Evidence of small/mod sized pleural effusion on RIGHT side on CT chest  POCUS  11/30, small-moderate sized fluid pocket seen very inferiorly on right  Patient's current symptoms more likely explained by SVC syndrome and PNA, but given worsening SOB, proceeded with chest tube placement.   RT PTC placed 11/30   Maintain CT to WS, Record drainage Q12H, daily CXR while still in place   f/u cytology, culture   Lights criteria: exudative  Thoracic will follow.    D/w Dr. Braswell.

## 2024-12-02 NOTE — PROGRESS NOTE ADULT - ASSESSMENT
54 year old male with PMHx lung cancer treated with chemo in [place] (last treatment 2 weeks ago) advised to come in by oncology RN for trouble breathing and tachycardia. Patient speaking in short sentences, requesting wife at bedside provide history. Per wife Zayra, patient has been short of breath since last chemo treatment, has to sit up in chair/bed, cannot lay down without worsening symptoms. Patient utilizes 4L O2 at baseline and up to 8L when moving. Wife states patient appeared worse today, called oncology office and was advised to come into ED. Wife also notes patient with poor appetite and energy since chemotherapy. States patient's HR has been in the 120s over last 2 weeks, including during last chemo session which staff was not concerned about. Denies fevers, nausea, vomiting, diarrhea, chest pain. CXR with possible Left pleural effusion. CT surgery called to evaluate. S/p RT PTC on 11/30

## 2024-12-02 NOTE — CONSULT NOTE ADULT - ASSESSMENT
Transitioniing to Comfort Care today due to severe worsening dyspnea, Tachypnea and Chest and generalized pain      Acute Hypoxic Respiratory Treatment  Maintained on continuous O2 6L NC  Xopenex nebulizer treatments  Morphine continuous IV infusion stated at 2L/Min          Acute Cancer related pain  B/L chest pain and pressure  MOrphine 4mg IVP stat  Starting Morphine Infusion at 2mg/hr  Decadron 4mg IV Q8    Anxiety  > Xanax to 1mg Q8 ATC    Secretion Mgmt  Scopolammine patch  Robinul Patient is a 53 yo M PMH of Small Cell Lung Cancer via Bx of neck mass on 3/24. Since his last chemo treatment 2 weeks ago, he has had episodes of hemoptysis and Tachycardia, severe dyspnea requiring increasing titration of nasal O2 to 8L.NC. He is refusing Hi [employer]aro Oxygenation. He cannot lie flat due to severe dyspnea and pain. Voice hypophonic, no longer eating sips of fluid only. B/L Chest pain worse with coughing . R pleurex catheter to Pleurovac.  He has had disease progression despite oncologic hematologic treatment. Extremely symptomatic and anxious, requested that he just wants to be comfortable, signing his own DNR/DNI. Wife at bedside during this discussion and decision making.  He is transitioning Comfort measures only today asking for aggressive symptom management for his worsening dyspnea and pain.  Comfort care orders entered into [facility]. Medical team notified      Acute Hypoxic Respiratory Treatment  Maintained on continuous O2 8L NC  Xopenex nebulizer treatments  Morphine continuous IV infusion stated at 2L/Min  Became diaphoretic, still C/O severe dyspnea and pain.  Morphine infusion > to 4mg/hr rate with 5 mg IVP bolus for BTP available  Treating anxiety as well    Acute Cancer related pain  B/L chest pain and pressure  Morphine 4mg IVP stat  Starting Morphine Infusion at 2mg/hr > to 4mg/hr  Decadron 4mg IV Q8    Anxiety  Treat anxiety aggressivley  Ativan preferred benzodiazepene as per Dr. Steph Carter 1mg IV Q8h ATC ordered    Secretion Mgmt  Scopolammine patch  Ritu Everett had a long discussion with Patient and wife at bedside  It was decided by patient that he needed to be made comfortable, signing his own MOLST DNR/DNI  Patient has advanced disease progression,  with SVC compression that cannot be radiated due to his severe pain and dyspnea  He would not be able to tolerate lying down flat for that treatment  Transitioned to comfort measures, Hospice consulted for possible In-Pt bed

## 2024-12-04 LAB
CULTURE RESULTS: SIGNIFICANT CHANGE UP
SPECIMEN SOURCE: SIGNIFICANT CHANGE UP

## 2024-12-05 ENCOUNTER — APPOINTMENT (OUTPATIENT)
Dept: INFUSION THERAPY | Facility: HOSPITAL | Age: 54
End: 2024-12-05

## 2024-12-05 ENCOUNTER — APPOINTMENT (OUTPATIENT)
Dept: HEMATOLOGY ONCOLOGY | Facility: CLINIC | Age: 54
End: 2024-12-05

## 2024-12-05 LAB
CULTURE RESULTS: SIGNIFICANT CHANGE UP
SPECIMEN SOURCE: SIGNIFICANT CHANGE UP

## 2024-12-06 ENCOUNTER — APPOINTMENT (OUTPATIENT)
Dept: INFUSION THERAPY | Facility: HOSPITAL | Age: 54
End: 2024-12-06

## 2024-12-09 LAB — NON-GYNECOLOGICAL CYTOLOGY STUDY: SIGNIFICANT CHANGE UP

## 2024-12-24 NOTE — CHART NOTE - NSCHARTNOTEFT_GEN_A_CORE
54 year old male with a past medical history of Small Cell Lung cancer via biopsy of L neck mass on 03/24, initally was on zarxio, Per wife patient is now on zepzelca. Since chemotherapy 2 weeks ago patient has been tachycardic to the 120s and has had episodes of hemoptysis. Admitted with Acute on Chronic Respiratory Failure mulifactorial in setting of progression of metastatic SCLC, bilateral pleural effusions R>L, left upper lobe pneumonia. Pt also noted to have SVC compression 2/2 lymphadenopathy.    RN called to state pt having increased work of breathing / oxygen level WNL no hypoxia, pt states he can not breathe        PHYSICAL EXAM  GENERAL: Pt seen at bedside cachetic,, sitting up in chair alert and oriented appears tired and anxious, increased WOB noted  HEAD:  Atraumatic, Normocephalic  EYES: EOMI, PERRL, conjunctiva and sclera clear  ENT: dry  mucous membranes  CHEST/LUNG: respirations labored decreased BS bilateral   HEART: s1s2 tachycardia   ABDOMEN: Bowel sounds present; Soft, Nontender, Nondistended   EXTREMITIES:  2+ Peripheral Pulses, brisk capillary refill. No clubbing, cyanosis, or edema  SKIN: No rashes or lesions      Vital Signs Last 24 Hrs  T(C): 36.4 (30 Nov 2024 07:49), Max: 37.3 (29 Nov 2024 16:05)  T(F): 97.6 (30 Nov 2024 07:49), Max: 99.1 (29 Nov 2024 16:05)  HR: 132 (30 Nov 2024 08:00) (104 - 143)  BP: 144/92 (30 Nov 2024 08:00) (102/68 - 144/92)  BP(mean): 101 (30 Nov 2024 08:00) (78 - 101)  RR: 20 (30 Nov 2024 09:14) (20 - 26)  SpO2: 100% (30 Nov 2024 09:14) (96% - 100%)    Parameters below as of 30 Nov 2024 09:14  Patient On (Oxygen Delivery Method): nasal cannula, high flow  O2 Flow (L/min): 25  O2 Concentration (%): 60    ABG - ( 30 Nov 2024 08:37 )  pH, Arterial: 7.460 pH, Blood: x     /  pCO2: 43    /  pO2: 235   / HCO3: 31    / Base Excess: 6.8   /  SaO2: 99.4        Plan:   Thoracic surgery called and updated regarding pt increased SOB and work of breathing  Pulm contacted for recs  Cardio notes reviewed recs appreciated   Stat duoneb ordered and then q 6 with prn for sob   RT at bedside trailed high-flow that pt states he can not tolerate pt placed back on NC   Pt refused bi-pap   Xanax ordered 1 time 0.25 po   bolus over 2 hours   speech swallow eval ordered pt with difficulty swallowing states he cant tolerate due to pain   Attending updated
54M with PMH of Chronic Hypoxic Respiratory Failure and Metastatic SCLC recently initiated cycle of Zepzelca at [facility] 2 weeks PTA presenting to ER with tachycardia, SOB and hemoptysis since initiation of chemo. In ER CTA demonstrated Significant diffuse LAD, severe SVC Compression, SERG consolidation/Mass, Bilateral pleural effusions. Noted to be maintaining oxygen on home rate NC 4LPM. Also noted to be sinus tachycardic to 120's-130's with non ischemic ekc and negative serial troponins.    Acute on Chronic Respiratory Failure in s/o metastatic lung cancer, bilateral pleural effusions, SERG PNA.  CTA reviewed. No PE however with LAD, metastatic cancer, b/l pleural effusions and svc narrowing sec to LAD.   Leukopenia nut no neutropenia, RVP negative   /Oxygen supplementation  Check Urine strep, legionella, sputum culture   Vanco/Cefepime/Azithro, ID Consult  Thoracic surgery consulted for effusion and plan to re-eval with POCUS tomorrow. Received 20mg IVP Lasix.  Repeat CXR in am for further dosing     Severe SVC Narrowing secondary to Compressive LAD  Vascular Consulted     Sinus Tachycardia - multifactorial in s/o recent chemo, cancer, svc compression, anemia, hypoxia  ACS has been ruled out  Tele   Cardio consult  TTE    Small Cell Lung Cancer on Home oxygen recently initiated new cycle of chemo with Zepzelca follows with [facility]  ER spoke with primary oncology and recommended admission. Reported that patient needs opportunity to undergo few more doses of chemo to allow for cancer shrinkage.  Oncology consult   Palliative consult    Acute Blood Loss Anemia   Hemoptysis   HGB with mild downtrend. Reports hemoptysis x 2 weeks and noted at bedside during eval   CTA without PE to suggest infarct.   Suspect secondary to lung mass itself and possibly secondary to PNA  Saline nebulizers   Pulm Eval  Trend HGB    Leukopenia 2/2 chemotherapy   trend neutrophil counts    Constipation  Senna/Miralax    DVT ppx: SCDs in setting of hemoptysis   Dispo: Stepdown     Full H&P to Follow
PA NOTE-MEDICINE    Called by RN due to Pt with New onset AFib RVR into 190s.    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 Patient speaking in short sentences, requesting wife at bedside provide history. Per wife Zayra, patient has been short of breath since last chemo treatment, has to sit up in chair/bed, cannot lay down without worsening symptoms. Patient utilizes 4L O2 at baseline and up to 8L when moving. Wife states patient appeared worse, called oncology office and was advised to come into ED. Wife also notes patient with poor appetite and energy since chemotherapy. States patient's HR has been in the 120s over last 2 weeks, including during last chemo session which staff was not concerned about. Denies fevers, nausea, vomiting, diarrhea, chest pain. CXR Left pleural effusion and PNA.  CT surgery called to evaluate. S/p RT PTC on 11/30 Maintain CT to WS,     Pt Denies: CP Dizziness SOB Above Baseline     T(C): 36.8 (01 Dec 2024 04:07), Max: 36.8 (30 Nov 2024 19:23)  T(F): 98.2 (01 Dec 2024 04:07), Max: 98.3 (01 Dec 2024 00:10)  HR: 190  BP: 90/60  BP(mean): 79 (01 Dec 2024 04:00) (70 - 101)  RR: 19 (01 Dec 2024 04:00) (18 - 26)  SpO2: 96% (01 Dec 2024 04:00) (96% - 100%) 6 L nc     General: WDWN Male sitting in chair in tripod Position (Pt states most comfortable)  Cardiac: Irreg/irreg Tachy  Lungs: Coarse BS throughout + CT in Place Left Lung   Integument: No Pallor warm/Dry    A/P Eval Pt due to New onset A-Fib RVR   Lopressor 5 Mg IVP x 1 Stat   HR lowered to 150s  Lopressor 5 Mg IVP x 1 Stat  HR Decreased Converted back to Sinus Rhythm @ 110     1st EKG: Afib RVR  @ 152 Incomplete RBBB  2nd EKG: Sinus @ 110 Incomplete RBBB  STAT LABS:   CBC  BMP  MAG  PHOS  TROPONIN  CK   Called Cardiology consult [facility]   Pt states he feels a Little Better   Pt currently spitting up Bloody sputum and is currently anemic therefore Doubt a candidate for AC   Continue to Monitor Pt  Recall PA for any reoccurrence or for any changes in Pt Status   Will sign out top AM Team to Follow
Palliative care social work note.    Bereavement call made to patients spouse Angella. Support and resources offered.
Palliative care social work note.    SW contacted earlier today by unit manager regarding patient request for information on accessing .  SW met with patient, spouse and family at bedside. Spouse reports that patient wishes to complete will and get his affairs in order as he has chosen to transition to comfort care. Spouse acknowledges obtaining  who is coming to hospital. Support offered.

## 2024-12-26 ENCOUNTER — APPOINTMENT (OUTPATIENT)
Dept: INFUSION THERAPY | Facility: HOSPITAL | Age: 54
End: 2024-12-26

## 2024-12-26 ENCOUNTER — APPOINTMENT (OUTPATIENT)
Dept: HEMATOLOGY ONCOLOGY | Facility: CLINIC | Age: 54
End: 2024-12-26

## 2024-12-27 ENCOUNTER — APPOINTMENT (OUTPATIENT)
Dept: INFUSION THERAPY | Facility: HOSPITAL | Age: 54
End: 2024-12-27

## 2024-12-28 LAB
CULTURE RESULTS: SIGNIFICANT CHANGE UP
SPECIMEN SOURCE: SIGNIFICANT CHANGE UP

## 2025-01-16 ENCOUNTER — APPOINTMENT (OUTPATIENT)
Dept: INFUSION THERAPY | Facility: HOSPITAL | Age: 55
End: 2025-01-16

## 2025-01-16 ENCOUNTER — APPOINTMENT (OUTPATIENT)
Dept: HEMATOLOGY ONCOLOGY | Facility: CLINIC | Age: 55
End: 2025-01-16

## 2025-01-17 ENCOUNTER — APPOINTMENT (OUTPATIENT)
Dept: INFUSION THERAPY | Facility: HOSPITAL | Age: 55
End: 2025-01-17